# Patient Record
Sex: MALE | Race: WHITE | Employment: OTHER | URBAN - METROPOLITAN AREA
[De-identification: names, ages, dates, MRNs, and addresses within clinical notes are randomized per-mention and may not be internally consistent; named-entity substitution may affect disease eponyms.]

---

## 2017-11-14 ENCOUNTER — HOSPITAL ENCOUNTER (OUTPATIENT)
Dept: GENERAL RADIOLOGY | Age: 82
Discharge: HOME | End: 2017-11-14
Attending: FAMILY MEDICINE
Payer: MEDICARE

## 2017-11-14 DIAGNOSIS — R05.9 COUGH IN ADULT: ICD-10-CM

## 2017-11-14 PROCEDURE — 71020 XR CHEST PA LAT: CPT

## 2017-11-14 NOTE — PROGRESS NOTES
Please let Mr. Veronica Ramirez know that I have send in Doxycycline. Take full course of medication even if feeling better. Take with food. May continue with Mucinex. CXR does not show PNA but will treat based on symptoms and elevated wbc. Make sure he knows to follow up in 1-2 weeks with Dr. Gray Matta. Patient asked to call this number today for results. .. Clarissa Bell  [phone]

## 2019-05-23 ENCOUNTER — HOSPITAL ENCOUNTER (EMERGENCY)
Age: 84
Discharge: HOME | End: 2019-05-23
Attending: EMERGENCY MEDICINE
Payer: MEDICARE

## 2019-05-23 ENCOUNTER — APPOINTMENT (OUTPATIENT)
Dept: GENERAL RADIOLOGY | Age: 84
End: 2019-05-23
Attending: EMERGENCY MEDICINE
Payer: MEDICARE

## 2019-05-23 VITALS
OXYGEN SATURATION: 95 % | HEART RATE: 90 BPM | DIASTOLIC BLOOD PRESSURE: 74 MMHG | SYSTOLIC BLOOD PRESSURE: 125 MMHG | TEMPERATURE: 98 F | RESPIRATION RATE: 16 BRPM | WEIGHT: 238 LBS | BODY MASS INDEX: 33.32 KG/M2 | HEIGHT: 71 IN

## 2019-05-23 DIAGNOSIS — M25.552 LEFT HIP PAIN: Primary | ICD-10-CM

## 2019-05-23 PROCEDURE — 99283 EMERGENCY DEPT VISIT LOW MDM: CPT | Performed by: EMERGENCY MEDICINE

## 2019-05-23 PROCEDURE — 73502 X-RAY EXAM HIP UNI 2-3 VIEWS: CPT

## 2019-05-23 NOTE — DISCHARGE INSTRUCTIONS
1) Follow up with orthopedics for further evaluation into the cause of your hip pain. It is possible your hip pain may be due to degenerative changes in your lower back. 2) Take the pain medication prescribed today by your primary care doctor for pain. 3) Return to the ER for worsening of your symptoms.
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
## 2019-05-23 NOTE — ED NOTES
I have reviewed discharge instructions with the patient. The patient verbalized understanding. Patient left ED via Discharge Method: ambulatory to Home with self. Opportunity for questions and clarification provided. Patient given 0 scripts. To continue your aftercare when you leave the hospital, you may receive an automated call from our care team to check in on how you are doing. This is a free service and part of our promise to provide the best care and service to meet your aftercare needs.  If you have questions, or wish to unsubscribe from this service please call [phone]. Thank you for Choosing our [facility] Emergency Department.

## 2019-05-23 NOTE — ED TRIAGE NOTES
Pt c/o left hip pain and states he thinks it is coming from his back. Pt states the pain will be intense for a while and then lighten up. Pt has strong smell of urine from clothing.

## 2019-07-18 ENCOUNTER — HOSPITAL ENCOUNTER (OUTPATIENT)
Dept: GENERAL RADIOLOGY | Age: 84
Discharge: HOME | End: 2019-07-18
Payer: MEDICARE

## 2019-07-18 DIAGNOSIS — S49.91XA INJURY OF RIGHT SHOULDER, INITIAL ENCOUNTER: ICD-10-CM

## 2019-07-18 PROCEDURE — 73010 X-RAY EXAM OF SHOULDER BLADE: CPT

## 2020-06-02 ENCOUNTER — HOSPITAL ENCOUNTER (OUTPATIENT)
Dept: SURGERY | Age: 85
Discharge: HOME | End: 2020-06-02
Payer: MEDICARE

## 2020-06-02 VITALS
BODY MASS INDEX: 32.37 KG/M2 | WEIGHT: 231.25 LBS | TEMPERATURE: 98.3 F | OXYGEN SATURATION: 95 % | DIASTOLIC BLOOD PRESSURE: 77 MMHG | HEART RATE: 80 BPM | HEIGHT: 71 IN | SYSTOLIC BLOOD PRESSURE: 154 MMHG | RESPIRATION RATE: 16 BRPM

## 2020-06-02 LAB
ANION GAP SERPL CALC-SCNC: 4 MMOL/L (ref 7–16)
BUN SERPL-MCNC: 23 MG/DL (ref 8–23)
CALCIUM SERPL-MCNC: 9 MG/DL (ref 8.3–10.4)
CHLORIDE SERPL-SCNC: 105 MMOL/L (ref 98–107)
CO2 SERPL-SCNC: 29 MMOL/L (ref 21–32)
CREAT SERPL-MCNC: 1.1 MG/DL (ref 0.8–1.5)
ERYTHROCYTE [DISTWIDTH] IN BLOOD BY AUTOMATED COUNT: 13.9 % (ref 11.9–14.6)
GLUCOSE SERPL-MCNC: 92 MG/DL (ref 65–100)
HCT VFR BLD AUTO: 39.2 % (ref 41.1–50.3)
HGB BLD-MCNC: 12.6 G/DL (ref 13.6–17.2)
MCH RBC QN AUTO: 32 PG (ref 26.1–32.9)
MCHC RBC AUTO-ENTMCNC: 32.1 G/DL (ref 31.4–35)
MCV RBC AUTO: 99.5 FL (ref 79.6–97.8)
NRBC # BLD: 0 K/UL (ref 0–0.2)
PLATELET # BLD AUTO: 239 K/UL (ref 150–450)
PMV BLD AUTO: 8.7 FL (ref 9.4–12.3)
POTASSIUM SERPL-SCNC: 4.7 MMOL/L (ref 3.5–5.1)
RBC # BLD AUTO: 3.94 M/UL (ref 4.23–5.6)
SODIUM SERPL-SCNC: 138 MMOL/L (ref 136–145)
WBC # BLD AUTO: 12.6 K/UL (ref 4.3–11.1)

## 2020-06-02 PROCEDURE — 80048 BASIC METABOLIC PNL TOTAL CA: CPT

## 2020-06-02 PROCEDURE — 85027 COMPLETE CBC AUTOMATED: CPT

## 2020-06-02 NOTE — PERIOP NOTES
Patient verified name and     Order for consent  found in EHR and matches case posting; patient verified. Type 1B surgery, Walk in  assessment complete. Labs per surgeon: CBC and BMP; results pending. Labs per anesthesia protocol: no additional;   EKG: records in Care everywhere. Patient informed a Covid swab is required 7 days prior to surgery. The testing center is located at the [address], [facility]. For questions or concerns the patient is advised to call [phone]. An appointment is required and the testing clinic is closed from  for lunch and on weekends. Appointment date/time 20 at 1505 found in EHR and provided to patient. Hospital approved surgical skin cleanser and instructions given per hospital policy. Patient provided with and instructed on educational handouts including Guide to Surgery, Pain Management, Hand Hygiene, Blood Transfusion Education, and [facility] Brochure. Patient answered medical/surgical history questions at their best of ability. Pt was not aware of his current medications. Pt states he gets his refills at [facility]. Did not attempted to review medications with pharmacy due to pt stating several of his medications have been discontinued. Pt instructed to call PAT once home with medications. Patient instructed to hold all vitamins 7 days prior to surgery and NSAIDS 5 days prior to surgery, patient verbalized understanding. Patient teach back successful and patient demonstrates knowledge of instructions. PLEASE CONTINUE TAKING ALL PRESCRIPTION MEDICATIONS UP TO THE DAY OF SURGERY UNLESS OTHERWISE DIRECTED BELOW. DISCONTINUE all vitamins and supplements 7 days prior to surgery. DISCONTINUE Non-Steriodal Anti-Inflammatory (NSAIDS) such as Advil, Ibuprofen, Excedrin, Motrin, and Aleve 5 days prior to surgery.      Home Medications to take  the day of surgery      Please call [phone] when you get home with medication bottles. If you are not able to return call to [facility] with medications- Do not take any medications the morning of surgery. Bring all of your medications in their original medication bottles and the nurse will instruct you on which medications to take the day of surgery. Home Medications   to Hold             Comments      Hibiclens shower the night before surgery and the morning of surgery. COVID test today 6/2/20 at [place] [address]. *Visitor policy of 1 visitor per patient discussed. Please do not bring home medications with you on the day of surgery unless otherwise directed by your nurse. If you are instructed to bring home medications, please give them to your nurse as they will be administered by the nursing staff. If you have any questions, please call [facility] [phone] or [facility] [phone]. A copy of this note was provided to the patient for reference.

## 2020-06-02 NOTE — PERIOP NOTES
Recent Results (from the past 12 hour(s))   CBC W/O DIFF    Collection Time: 06/02/20  2:10 PM   Result Value Ref Range    WBC 12.6 (H) 4.3 - 11.1 K/uL    RBC 3.94 (L) 4.23 - 5.6 M/uL    HGB 12.6 (L) 13.6 - 17.2 g/dL    HCT 39.2 (L) 41.1 - 50.3 %    MCV 99.5 (H) 79.6 - 97.8 FL    MCH 32.0 26.1 - 32.9 PG    MCHC 32.1 31.4 - 35.0 g/dL    RDW 13.9 11.9 - 14.6 %    PLATELET 437 719 - 308 K/uL    MPV 8.7 (L) 9.4 - 12.3 FL    ABSOLUTE NRBC 0.00 0.0 - 0.2 K/uL   METABOLIC PANEL, BASIC    Collection Time: 06/02/20  2:10 PM   Result Value Ref Range    Sodium 138 136 - 145 mmol/L    Potassium 4.7 3.5 - 5.1 mmol/L    Chloride 105 98 - 107 mmol/L    CO2 29 21 - 32 mmol/L    Anion gap 4 (L) 7 - 16 mmol/L    Glucose 92 65 - 100 mg/dL    BUN 23 8 - 23 MG/DL    Creatinine 1.10 0.8 - 1.5 MG/DL    GFR est AA >60 >60 ml/min/1.73m2    GFR est non-AA >60 >60 ml/min/1.73m2    Calcium 9.0 8.3 - 10.4 MG/DL

## 2020-06-03 RX ORDER — LOSARTAN POTASSIUM 25 MG/1
25 TABLET ORAL
COMMUNITY
End: 2021-08-26 | Stop reason: SDUPTHER

## 2020-06-03 RX ORDER — HYDROCODONE BITARTRATE AND ACETAMINOPHEN 7.5; 325 MG/1; MG/1
1 TABLET ORAL
COMMUNITY
End: 2020-07-14

## 2020-06-03 RX ORDER — METOPROLOL TARTRATE 100 MG/1
100 TABLET ORAL
COMMUNITY
End: 2020-10-06

## 2020-06-03 NOTE — PERIOP NOTES
Received phone call from patient. Reviewed all medications. Patient instructed to stop following medications now and do not take from now through DOS. This includes: Vitamin C, multivitamin, fish oil, Meloxicam.  Patient instructed to take morning of surgery with sip of water:  Citalopram, Hydrocodone if needed, Levetiracetam, Lorazepam if needed, Omeprazole. Reviewed several times with patient. Patient repeated back correctly.

## 2020-06-08 ENCOUNTER — ANESTHESIA EVENT (OUTPATIENT)
Dept: SURGERY | Age: 85
End: 2020-06-08
Payer: MEDICARE

## 2020-06-09 ENCOUNTER — ANESTHESIA (OUTPATIENT)
Dept: SURGERY | Age: 85
End: 2020-06-09
Payer: MEDICARE

## 2020-06-09 ENCOUNTER — HOSPITAL ENCOUNTER (OUTPATIENT)
Age: 85
Setting detail: OUTPATIENT SURGERY
Discharge: HOME | End: 2020-06-09
Attending: UROLOGY | Admitting: UROLOGY
Payer: MEDICARE

## 2020-06-09 VITALS
TEMPERATURE: 98.6 F | WEIGHT: 228 LBS | HEART RATE: 71 BPM | DIASTOLIC BLOOD PRESSURE: 80 MMHG | BODY MASS INDEX: 31.92 KG/M2 | RESPIRATION RATE: 17 BRPM | HEIGHT: 71 IN | SYSTOLIC BLOOD PRESSURE: 160 MMHG | OXYGEN SATURATION: 97 %

## 2020-06-09 PROCEDURE — 77030005518 HC CATH URETH FOL 2W BARD -B: Performed by: UROLOGY

## 2020-06-09 PROCEDURE — 77030010509 HC AIRWY LMA MSK TELE -A: Performed by: ANESTHESIOLOGY

## 2020-06-09 PROCEDURE — 74011250636 HC RX REV CODE- 250/636: Performed by: UROLOGY

## 2020-06-09 PROCEDURE — 77030040831 HC BAG URINE DRNG MDII -A: Performed by: UROLOGY

## 2020-06-09 PROCEDURE — 77030040922 HC BLNKT HYPOTHRM STRY -A: Performed by: ANESTHESIOLOGY

## 2020-06-09 PROCEDURE — 76010000149 HC OR TIME 1 TO 1.5 HR: Performed by: UROLOGY

## 2020-06-09 PROCEDURE — 77030002996 HC SUT SLK J&J -A: Performed by: UROLOGY

## 2020-06-09 PROCEDURE — 77030040525: Performed by: UROLOGY

## 2020-06-09 PROCEDURE — 74011000250 HC RX REV CODE- 250: Performed by: NURSE ANESTHETIST, CERTIFIED REGISTERED

## 2020-06-09 PROCEDURE — 76210000020 HC REC RM PH II FIRST 0.5 HR: Performed by: UROLOGY

## 2020-06-09 PROCEDURE — 77030018832 HC SOL IRR H20 ICUM -A: Performed by: UROLOGY

## 2020-06-09 PROCEDURE — 76210000006 HC OR PH I REC 0.5 TO 1 HR: Performed by: UROLOGY

## 2020-06-09 PROCEDURE — 76060000033 HC ANESTHESIA 1 TO 1.5 HR: Performed by: UROLOGY

## 2020-06-09 PROCEDURE — 74011250636 HC RX REV CODE- 250/636: Performed by: NURSE ANESTHETIST, CERTIFIED REGISTERED

## 2020-06-09 PROCEDURE — C1769 GUIDE WIRE: HCPCS | Performed by: UROLOGY

## 2020-06-09 PROCEDURE — 74011250636 HC RX REV CODE- 250/636: Performed by: ANESTHESIOLOGY

## 2020-06-09 RX ORDER — SODIUM CHLORIDE, SODIUM LACTATE, POTASSIUM CHLORIDE, CALCIUM CHLORIDE 600; 310; 30; 20 MG/100ML; MG/100ML; MG/100ML; MG/100ML
100 INJECTION, SOLUTION INTRAVENOUS CONTINUOUS
Status: DISCONTINUED | OUTPATIENT
Start: 2020-06-09 | End: 2020-06-09 | Stop reason: HOSPADM

## 2020-06-09 RX ORDER — PROPOFOL 10 MG/ML
INJECTION, EMULSION INTRAVENOUS AS NEEDED
Status: DISCONTINUED | OUTPATIENT
Start: 2020-06-09 | End: 2020-06-09 | Stop reason: HOSPADM

## 2020-06-09 RX ORDER — LIDOCAINE HYDROCHLORIDE 10 MG/ML
0.1 INJECTION INFILTRATION; PERINEURAL AS NEEDED
Status: DISCONTINUED | OUTPATIENT
Start: 2020-06-09 | End: 2020-06-09 | Stop reason: HOSPADM

## 2020-06-09 RX ORDER — HYOSCYAMINE SULFATE 0.12 MG/1
0.12 TABLET SUBLINGUAL
Qty: 30 TAB | Refills: 1 | Status: SHIPPED | OUTPATIENT
Start: 2020-06-09 | End: 2020-09-14 | Stop reason: SDUPTHER

## 2020-06-09 RX ORDER — SODIUM CHLORIDE, SODIUM LACTATE, POTASSIUM CHLORIDE, CALCIUM CHLORIDE 600; 310; 30; 20 MG/100ML; MG/100ML; MG/100ML; MG/100ML
75 INJECTION, SOLUTION INTRAVENOUS CONTINUOUS
Status: DISCONTINUED | OUTPATIENT
Start: 2020-06-09 | End: 2020-06-09 | Stop reason: HOSPADM

## 2020-06-09 RX ORDER — CEFAZOLIN SODIUM/WATER 2 G/20 ML
2 SYRINGE (ML) INTRAVENOUS
Status: COMPLETED | OUTPATIENT
Start: 2020-06-09 | End: 2020-06-09

## 2020-06-09 RX ORDER — ONDANSETRON 2 MG/ML
4 INJECTION INTRAMUSCULAR; INTRAVENOUS
Status: DISCONTINUED | OUTPATIENT
Start: 2020-06-09 | End: 2020-06-09 | Stop reason: HOSPADM

## 2020-06-09 RX ORDER — DIPHENHYDRAMINE HYDROCHLORIDE 50 MG/ML
12.5 INJECTION, SOLUTION INTRAMUSCULAR; INTRAVENOUS
Status: DISCONTINUED | OUTPATIENT
Start: 2020-06-09 | End: 2020-06-09 | Stop reason: HOSPADM

## 2020-06-09 RX ORDER — OXYCODONE HYDROCHLORIDE 5 MG/1
5 TABLET ORAL
Status: DISCONTINUED | OUTPATIENT
Start: 2020-06-09 | End: 2020-06-09 | Stop reason: HOSPADM

## 2020-06-09 RX ORDER — ONDANSETRON 2 MG/ML
INJECTION INTRAMUSCULAR; INTRAVENOUS AS NEEDED
Status: DISCONTINUED | OUTPATIENT
Start: 2020-06-09 | End: 2020-06-09 | Stop reason: HOSPADM

## 2020-06-09 RX ORDER — LIDOCAINE HYDROCHLORIDE 20 MG/ML
INJECTION, SOLUTION EPIDURAL; INFILTRATION; INTRACAUDAL; PERINEURAL AS NEEDED
Status: DISCONTINUED | OUTPATIENT
Start: 2020-06-09 | End: 2020-06-09 | Stop reason: HOSPADM

## 2020-06-09 RX ORDER — HYDROMORPHONE HYDROCHLORIDE 2 MG/ML
0.2 INJECTION, SOLUTION INTRAMUSCULAR; INTRAVENOUS; SUBCUTANEOUS
Status: DISCONTINUED | OUTPATIENT
Start: 2020-06-09 | End: 2020-06-09 | Stop reason: HOSPADM

## 2020-06-09 RX ORDER — DEXAMETHASONE SODIUM PHOSPHATE 4 MG/ML
INJECTION, SOLUTION INTRA-ARTICULAR; INTRALESIONAL; INTRAMUSCULAR; INTRAVENOUS; SOFT TISSUE AS NEEDED
Status: DISCONTINUED | OUTPATIENT
Start: 2020-06-09 | End: 2020-06-09 | Stop reason: HOSPADM

## 2020-06-09 RX ORDER — FENTANYL CITRATE 50 UG/ML
INJECTION, SOLUTION INTRAMUSCULAR; INTRAVENOUS AS NEEDED
Status: DISCONTINUED | OUTPATIENT
Start: 2020-06-09 | End: 2020-06-09 | Stop reason: HOSPADM

## 2020-06-09 RX ADMIN — FENTANYL CITRATE 25 MCG: 50 INJECTION INTRAMUSCULAR; INTRAVENOUS at 14:27

## 2020-06-09 RX ADMIN — FENTANYL CITRATE 25 MCG: 50 INJECTION INTRAMUSCULAR; INTRAVENOUS at 14:43

## 2020-06-09 RX ADMIN — PROPOFOL 150 MG: 10 INJECTION, EMULSION INTRAVENOUS at 14:20

## 2020-06-09 RX ADMIN — FENTANYL CITRATE 50 MCG: 50 INJECTION INTRAMUSCULAR; INTRAVENOUS at 14:20

## 2020-06-09 RX ADMIN — FENTANYL CITRATE 25 MCG: 50 INJECTION INTRAMUSCULAR; INTRAVENOUS at 14:54

## 2020-06-09 RX ADMIN — ONDANSETRON 4 MG: 2 INJECTION INTRAMUSCULAR; INTRAVENOUS at 14:36

## 2020-06-09 RX ADMIN — Medication 2 G: at 14:24

## 2020-06-09 RX ADMIN — DEXAMETHASONE SODIUM PHOSPHATE 4 MG: 4 INJECTION, SOLUTION INTRAMUSCULAR; INTRAVENOUS at 14:36

## 2020-06-09 RX ADMIN — FENTANYL CITRATE 25 MCG: 50 INJECTION INTRAMUSCULAR; INTRAVENOUS at 14:41

## 2020-06-09 RX ADMIN — LIDOCAINE HYDROCHLORIDE 100 MG: 20 INJECTION, SOLUTION EPIDURAL; INFILTRATION; INTRACAUDAL; PERINEURAL at 14:20

## 2020-06-09 RX ADMIN — SODIUM CHLORIDE, SODIUM LACTATE, POTASSIUM CHLORIDE, AND CALCIUM CHLORIDE 100 ML/HR: 600; 310; 30; 20 INJECTION, SOLUTION INTRAVENOUS at 11:44

## 2020-06-09 RX ADMIN — FENTANYL CITRATE 25 MCG: 50 INJECTION INTRAMUSCULAR; INTRAVENOUS at 14:31

## 2020-06-09 NOTE — ANESTHESIA POSTPROCEDURE EVALUATION
Procedure(s):  CYSTOSCOPY WITH SUPRAPUBIC TUBE INSERTION. general    Anesthesia Post Evaluation      Multimodal analgesia: multimodal analgesia used between 6 hours prior to anesthesia start to PACU discharge  Patient location during evaluation: PACU  Patient participation: complete - patient participated  Level of consciousness: awake  Pain management: adequate  Airway patency: patent  Anesthetic complications: no  Cardiovascular status: acceptable  Respiratory status: spontaneous ventilation and acceptable  Hydration status: acceptable  Post anesthesia nausea and vomiting:  none      INITIAL Post-op Vital signs:   Vitals Value Taken Time   /80 6/9/2020  3:45 PM   Temp 37 °C (98.6 °F) 6/9/2020  3:45 PM   Pulse 70 6/9/2020  3:46 PM   Resp 17 6/9/2020  3:45 PM   SpO2 98 % 6/9/2020  3:46 PM   Vitals shown include unvalidated device data.
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
## 2020-06-09 NOTE — BRIEF OP NOTE
Brief Postoperative Note    Patient: Jaylin Nolasco  YOB: 1934  MRN: [id number]    Date of Procedure: 6/9/2020     Pre-Op Diagnosis: Urinary retention [R33.9]    Post-Op Diagnosis: Same as preoperative diagnosis. Procedure(s):  CYSTOSCOPY WITH SUPRAPUBIC TUBE INSERTION    Surgeon(s): Jhonatan Hood MD    Surgical Assistant: None    Anesthesia: General     Estimated Blood Loss (mL): Minimal    Complications: None    Specimens: * No specimens in log *     Implants: * No implants in log *    Drains: Martinez Catheter 16 Fr two way with 10 cc sterile water     Findings: Trabeculated bladder without abnormality. Successful SP tube placement.      Electronically Signed by Miguel Botello MD on 6/9/2020 at 3:01 PM

## 2020-06-09 NOTE — ANESTHESIA PREPROCEDURE EVALUATION
Relevant Problems   No relevant active problems       Anesthetic History   No history of anesthetic complications            Review of Systems / Medical History  Patient summary reviewed and pertinent labs reviewed    Pulmonary          Shortness of breath and smoker         Neuro/Psych     seizures: well controlled    Psychiatric history (Schizophrenia, Bipolar D/o, Depression)    Comments: Neurogenic claudication, H/o Intracerebral hemorrhage.  Cardiovascular    Hypertension        Dysrhythmias : atrial fibrillation  Hyperlipidemia    Exercise tolerance: >4 METS  Comments: Denies recent CP, SOB or Palpitations   GI/Hepatic/Renal     GERD: well controlled           Endo/Other        Obesity     Other Findings            Physical Exam    Airway  Mallampati: II  TM Distance: 4 - 6 cm  Neck ROM: normal range of motion   Mouth opening: Normal     Cardiovascular  Regular rate and rhythm,  S1 and S2 normal,  no murmur, click, rub, or gallop             Dental  No notable dental hx       Pulmonary  Breath sounds clear to auscultation               Abdominal  GI exam deferred       Other Findings            Anesthetic Plan    ASA: 3  Anesthesia type: general          Induction: Intravenous  Anesthetic plan and risks discussed with: Patient    Granddaughter present

## 2020-06-09 NOTE — DISCHARGE INSTRUCTIONS
You are going home with a suprapubic catheter in place. This tube is placed directly into the bladder through your abdomen to drain urine from your bladder. Home care  [facility] as necessary.  Change your dressing every day until your follow up appointment. When to call your healthcare provider  Call your health care provider right away if you have any of the following:   Catheter that falls out, or is clogged or feels clogged   Urine leaking around catheter   Urine that is cloudy, bloody, or smells bad   No urine drainage   Bladder that feels full or painful   Rash, itching, redness, swelling, or drainage at the catheter site  DIET  · Clear liquids until no nausea or vomiting; then light diet for the first day. · Advance to regular diet on second day, unless your doctor orders otherwise. · If nausea and vomiting continues, call your doctor. PAIN  · Take pain medication as directed by your doctor. · Call your doctor if pain is NOT relieved by medication. · DO NOT take aspirin of blood thinners unless directed by your doctor. After general anesthesia or intravenous sedation, for 24 hours or while taking prescription Narcotics:  · Limit your activities  · A responsible adult needs to be with you for the next 24 hours  · Do not drive and operate hazardous machinery  · Do not make important personal or business decisions  · Do not drink alcoholic beverages  · If you have not urinated within 8 hours after discharge, please contact your surgeon on call. · If you have sleep apnea and have a CPAP machine, please use it for all naps and sleeping. · Please use caution when taking narcotics and any of your home medications that may cause drowsiness. These are general instructions for a healthy lifestyle:  No smoking/ No tobacco products/ Avoid exposure to second hand smoke  Surgeon General's Warning:  Quitting smoking now greatly reduces serious risk to your health.     An indwelling Martinez Catheter is a tube that empties urine from your bladder into a drainage bag. To prevent blockage of the catheter and contamination of the urine, it is important that you follow a few guidelines in caring for your catheter:    1. Empty your drainage bag once every 8 hours or as soon as it fills. 2.  Empty the bag by loosening the clamp on the end of the bag (leg or bedside bag). Do not touch the tip of the tube. After draining the urine into the toilet, you may clean the tip with a povidone-iodine (Betadine) solution. Wash hands well before and after emptying drainage bag.    3.  Always keep the drainage bag lower than the catheter. Remember that urine will not drain uphill or against gravity. Check the tubing for kinks, since urine will not drain past a kink. 4. Flush your bladder by drinking plenty of liquids. Clear liquids and water are ideal; remember to drink at least 8 glasses of water each day. 5.  It is important to clean around your meatus every day and after having a bowel movement (riri. Female)  while you have an indwelling arnett catheter. Using a soapy wash cloth, wash area thoroughly and rinse, using a forward to backward motion being careful not to introduce bacteria  around catheter. 6.  If you are to keep your catheter for an extended period of time, you may be given a leg bag that attaches to your thigh or calf with Velcro straps. If you are sent home with more than one bag, you will be given instructions on how to care for the bags and change them. 7.  If you are to remove your catheter at home, you will be sent home with specific instructions on how to do that.

## 2020-06-10 NOTE — OP NOTES
[facility]  OPERATIVE REPORT    Name:  Meka Shields  MR#:  [id number]  :  1934  ACCOUNT #:  [de-identified]  DATE OF SERVICE:  2020    PREOPERATIVE DIAGNOSIS:  Neurogenic bladder with urinary retention. POSTOPERATIVE DIAGNOSIS:  Neurogenic bladder with urinary retention. PROCEDURE PERFORMED:  Cystoscopy with suprapubic catheter placement. SURGEON:  Cindie Phoenix, MD    ASSISTANT:  None. ANESTHESIA:  General.    COMPLICATIONS:  None. SPECIMENS REMOVED:  None. IMPLANTS:  None. ESTIMATED BLOOD LOSS:  Minimal.    DRAINS:  Martinez catheter [facility] two-way 20 mL sterile water in the balloon. FINDINGS:  Trabeculated bladder without abnormalities, successful SPC placement under direct vision. INDICATIONS FOR OPERATIVE PROCEDURE:  The patient is an 70-year-old gentleman with chronic neurogenic bladder and urinary retention, managed with indwelling Martinez catheter who states that he currently has issues with pain from chronic catheter as well as concern for hypospadias from a long-term indwelling catheter. He presented to the office and I recommended suprapubic catheter placement in the operating room today after risks and benefit discussion to drain his bladder, but also allow him to be made free from a traumatic catheter. DESCRIPTION OF OPERATIVE PROCEDURE:  After informed consent was obtained, the correct patient was identified in the preoperative holding area. He was taken back to the operating room suite and placed on the table in the supine position. Time-out was performed confirming the correct patient and planned procedure. He received 2 g of IV Ancef prior to smooth induction of general endotracheal anesthesia. He was placed in dorsal lithotomy position. He was then prepped and draped in the usual sterile fashion. I began the case by inserting a 22-Bahamian rigid cystoscope with a 30-degree lens into the urethra.   I advanced into the patient's bladder. Pancystoscopy was performed which revealed a possible trabeculated bladder, but no concerning bladder masses or other abnormalities were noted. I then turned the cystoscope to look at the dome of the patient's bladder as it went through a portion of the bladder. I identified a 0.1 cm above the patient's pubic bone at the midline. I made a stab incision using a #11-blade scalpel. I then inserted the spinal needle and passed it under direct vision into the patient's bladder. I then successfully passed a sensor wire through the spinal needle under direct vision and I removed the spinal needle leaving the sensor wire in place at that time. I then used [facility] SPT dilator set and similarly dilated over the sensor wire . I placed the access sheath over the left dilator. I then removed the dilator and then passed a 16-[place]-tip catheter over the sensor wire into the patient's previous sheath into the bladder. The catheter was visualized and passed through the sheath, I then pulled the sheath away and inflated the balloon with 20 mL of sterile water. I removed the sensor wire and left the catheter to drainage. I used 2-0 silk ties to suture the catheter in place so that it would not dislodge. I connected it to drainage. The patient was awoken from anesthesia, transferred to the PACU in stable condition. He tolerated the procedure well. There were no complications. All counts were correct at the end of the procedure.         Jennifer Aguilar MD      [id number]  D:  06/09/2020 15:16  T:  06/10/2020 5:54  JOB #:  [id number]

## 2020-06-30 ENCOUNTER — APPOINTMENT (OUTPATIENT)
Dept: PHYSICAL THERAPY | Age: 85
End: 2020-06-30

## 2020-07-06 ENCOUNTER — HOSPITAL ENCOUNTER (OUTPATIENT)
Dept: SURGERY | Age: 85
Discharge: HOME | End: 2020-07-06
Payer: MEDICARE

## 2020-07-06 VITALS
BODY MASS INDEX: 30.99 KG/M2 | WEIGHT: 233.8 LBS | RESPIRATION RATE: 16 BRPM | HEIGHT: 73 IN | TEMPERATURE: 97.3 F | DIASTOLIC BLOOD PRESSURE: 75 MMHG | SYSTOLIC BLOOD PRESSURE: 153 MMHG | HEART RATE: 73 BPM | OXYGEN SATURATION: 100 %

## 2020-07-06 LAB
ALBUMIN SERPL-MCNC: 3.3 G/DL (ref 3.2–4.6)
ANION GAP SERPL CALC-SCNC: 4 MMOL/L (ref 7–16)
APTT PPP: 28 SEC (ref 24.3–35.4)
BACTERIA SPEC CULT: ABNORMAL
BASOPHILS # BLD: 0.1 K/UL (ref 0–0.2)
BASOPHILS NFR BLD: 1 % (ref 0–2)
BUN SERPL-MCNC: 26 MG/DL (ref 8–23)
CALCIUM SERPL-MCNC: 8.9 MG/DL (ref 8.3–10.4)
CHLORIDE SERPL-SCNC: 106 MMOL/L (ref 98–107)
CO2 SERPL-SCNC: 29 MMOL/L (ref 21–32)
CREAT SERPL-MCNC: 1.05 MG/DL (ref 0.8–1.5)
DIFFERENTIAL METHOD BLD: ABNORMAL
EOSINOPHIL # BLD: 0.2 K/UL (ref 0–0.8)
EOSINOPHIL NFR BLD: 2 % (ref 0.5–7.8)
ERYTHROCYTE [DISTWIDTH] IN BLOOD BY AUTOMATED COUNT: 13.7 % (ref 11.9–14.6)
EST. AVERAGE GLUCOSE BLD GHB EST-MCNC: 134 MG/DL
GLUCOSE SERPL-MCNC: 96 MG/DL (ref 65–100)
HBA1C MFR BLD: 6.3 %
HCT VFR BLD AUTO: 41.3 % (ref 41.1–50.3)
HGB BLD-MCNC: 12.8 G/DL (ref 13.6–17.2)
IMM GRANULOCYTES # BLD AUTO: 0.1 K/UL (ref 0–0.5)
IMM GRANULOCYTES NFR BLD AUTO: 1 % (ref 0–5)
INR PPP: 1
LYMPHOCYTES # BLD: 1.9 K/UL (ref 0.5–4.6)
LYMPHOCYTES NFR BLD: 16 % (ref 13–44)
MCH RBC QN AUTO: 30.5 PG (ref 26.1–32.9)
MCHC RBC AUTO-ENTMCNC: 31 G/DL (ref 31.4–35)
MCV RBC AUTO: 98.6 FL (ref 79.6–97.8)
MONOCYTES # BLD: 1.1 K/UL (ref 0.1–1.3)
MONOCYTES NFR BLD: 9 % (ref 4–12)
NEUTS SEG # BLD: 8.3 K/UL (ref 1.7–8.2)
NEUTS SEG NFR BLD: 72 % (ref 43–78)
NRBC # BLD: 0 K/UL (ref 0–0.2)
PLATELET # BLD AUTO: 278 K/UL (ref 150–450)
PMV BLD AUTO: 9 FL (ref 9.4–12.3)
POTASSIUM SERPL-SCNC: 4.7 MMOL/L (ref 3.5–5.1)
PROTHROMBIN TIME: 13.3 SEC (ref 12–14.7)
RBC # BLD AUTO: 4.19 M/UL (ref 4.23–5.6)
SERVICE CMNT-IMP: ABNORMAL
SODIUM SERPL-SCNC: 139 MMOL/L (ref 136–145)
WBC # BLD AUTO: 11.6 K/UL (ref 4.3–11.1)

## 2020-07-06 PROCEDURE — 82040 ASSAY OF SERUM ALBUMIN: CPT

## 2020-07-06 PROCEDURE — 87641 MR-STAPH DNA AMP PROBE: CPT

## 2020-07-06 PROCEDURE — 83036 HEMOGLOBIN GLYCOSYLATED A1C: CPT

## 2020-07-06 PROCEDURE — 80307 DRUG TEST PRSMV CHEM ANLYZR: CPT

## 2020-07-06 PROCEDURE — 36415 COLL VENOUS BLD VENIPUNCTURE: CPT

## 2020-07-06 PROCEDURE — 85730 THROMBOPLASTIN TIME PARTIAL: CPT

## 2020-07-06 PROCEDURE — 80048 BASIC METABOLIC PNL TOTAL CA: CPT

## 2020-07-06 PROCEDURE — 85025 COMPLETE CBC W/AUTO DIFF WBC: CPT

## 2020-07-06 PROCEDURE — 85610 PROTHROMBIN TIME: CPT

## 2020-07-06 RX ORDER — MV/FA/DHA/EPA/FISH OIL/SAW/GNK 400MCG-200
1 COMBINATION PACKAGE (EA) ORAL DAILY
COMMUNITY
End: 2020-07-14

## 2020-07-06 NOTE — PERIOP NOTES
PLEASE CONTINUE TAKING ALL PRESCRIPTION MEDICATIONS UP TO THE DAY OF SURGERY UNLESS OTHERWISE DIRECTED BELOW. DISCONTINUE all vitamins and supplements 21 days prior to surgery. DISCONTINUE Non-Steriodal Anti-Inflammatory (NSAIDS) such as Advil and Aleve 5 days prior to surgery. Home Medications to take  the day of surgery   Aspirin, citalopram, Hydrocodone if needed, levetiracetam, lorazepa if needed, omeprazole if needed            Home Medications   to Hold   Stop vitamins and supplements now   Stop meloxicam five days before surgery         Comments          *Visitor policy of 1 visitor per patient discussed. Please do not bring home medications with you on the day of surgery unless otherwise directed by your nurse. If you are instructed to bring home medications, please give them to your nurse as they will be administered by the nursing staff. If you have any questions, please call Shaggy Bryant [phone]. A copy of this note was provided to the patient for reference.

## 2020-07-06 NOTE — PERIOP NOTES
Patient verified name and . Order for consent found in EHR and matches case posting; patient verified. robotic-assisted right total knee arthroplasty    Type 3 surgery, PAT Joint assessment complete. Labs per surgeon: CBC,BMP, PT/PTT, HgbA1C, albumin and nicotine; results pending. Will have charge nurse check lab results. Labs per anesthesia protocol: no additional lab work needed. EKG: Done today- within anesthesia guidelines. Patient informed a Covid swab is required 7 days prior to surgery. [facility] [place]. For questions or concerns the patient is advised to call [phone]. An appointment is required and the testing clinic is closed from 12- for lunch and on weekends. Appointment date/time found in EHR and provided to patient. Last cardiology office visit dated 20 and Echo dated 20 found in care everywhere if needed for anesthesia reference. Pt had new onset A. Fib with RVR on 02; will have anesthesia review chart. EKG today was NSR. MRSA/MSSA swab collected; pharmacy to review and dose antibiotic as appropriate. Hospital approved surgical skin cleanser and instructions to return bottle on DOS given per hospital policy. Patient provided with handouts including Guide to Surgery, Pain Management, Hand Hygiene, Blood Transfusion Education, and [place] Anesthesia Brochure. Patient answered medical/surgical history questions at their best of ability. All prior to admission medications documented in [facility]. Original medication prescription bottle not visualized during patient appointment. Patient instructed to hold all vitamins 3 weeks prior to surgery and NSAIDS 5 days prior to surgery. Patient teach back successful and patient demonstrates knowledge of instruction.

## 2020-07-07 LAB
ATRIAL RATE: 72 BPM
CALCULATED P AXIS, ECG09: 38 DEGREES
CALCULATED R AXIS, ECG10: 40 DEGREES
CALCULATED T AXIS, ECG11: 40 DEGREES
COTININE UR QL SCN: NEGATIVE NG/ML
DIAGNOSIS, 93000: NORMAL
DRUG SCREEN COMMENT:, 753798: NORMAL
P-R INTERVAL, ECG05: 166 MS
Q-T INTERVAL, ECG07: 400 MS
QRS DURATION, ECG06: 90 MS
QTC CALCULATION (BEZET), ECG08: 438 MS
VENTRICULAR RATE, ECG03: 72 BPM
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
## 2020-07-07 NOTE — PERIOP NOTES
Labs done 7/6/20 within [facility] protocols. Recent Results (from the past 24 hour(s))   EKG, 12 LEAD, INITIAL    Collection Time: 07/06/20  2:59 PM   Result Value Ref Range    Ventricular Rate 72 BPM    Atrial Rate 72 BPM    P-R Interval 166 ms    QRS Duration 90 ms    Q-T Interval 400 ms    QTC Calculation (Bezet) 438 ms    Calculated P Axis 38 degrees    Calculated R Axis 40 degrees    Calculated T Axis 40 degrees    Diagnosis       Normal sinus rhythm  Normal ECG  No previous ECGs available  Confirmed by Jose Metzger MD (), RUMA NGUYEN ([id number]) on 7/7/2020 7:29:13 AM     MSSA/MRSA SC BY PCR, NASAL SWAB    Collection Time: 07/06/20  4:00 PM   Result Value Ref Range    Special Requests: NO SPECIAL REQUESTS      Culture result: (A)       MRSA target DNA not detected, SA target DNA detected. A MRSA negative, SA positive test result does not preclude MRSA nasal colonization. CBC WITH AUTOMATED DIFF    Collection Time: 07/06/20  4:07 PM   Result Value Ref Range    WBC 11.6 (H) 4.3 - 11.1 K/uL    RBC 4.19 (L) 4.23 - 5.6 M/uL    HGB 12.8 (L) 13.6 - 17.2 g/dL    HCT 41.3 41.1 - 50.3 %    MCV 98.6 (H) 79.6 - 97.8 FL    MCH 30.5 26.1 - 32.9 PG    MCHC 31.0 (L) 31.4 - 35.0 g/dL    RDW 13.7 11.9 - 14.6 %    PLATELET 891 053 - 750 K/uL    MPV 9.0 (L) 9.4 - 12.3 FL    ABSOLUTE NRBC 0.00 0.0 - 0.2 K/uL    DF AUTOMATED      NEUTROPHILS 72 43 - 78 %    LYMPHOCYTES 16 13 - 44 %    MONOCYTES 9 4.0 - 12.0 %    EOSINOPHILS 2 0.5 - 7.8 %    BASOPHILS 1 0.0 - 2.0 %    IMMATURE GRANULOCYTES 1 0.0 - 5.0 %    ABS. NEUTROPHILS 8.3 (H) 1.7 - 8.2 K/UL    ABS. LYMPHOCYTES 1.9 0.5 - 4.6 K/UL    ABS. MONOCYTES 1.1 0.1 - 1.3 K/UL    ABS. EOSINOPHILS 0.2 0.0 - 0.8 K/UL    ABS. BASOPHILS 0.1 0.0 - 0.2 K/UL    ABS. IMM.  GRANS. 0.1 0.0 - 0.5 K/UL   HEMOGLOBIN A1C WITH EAG    Collection Time: 07/06/20  4:07 PM   Result Value Ref Range    Hemoglobin A1c 6.3 %    Est. average glucose 037 mg/dL   METABOLIC PANEL, BASIC    Collection Time: 07/06/20  4:07 PM   Result Value Ref Range    Sodium 139 136 - 145 mmol/L    Potassium 4.7 3.5 - 5.1 mmol/L    Chloride 106 98 - 107 mmol/L    CO2 29 21 - 32 mmol/L    Anion gap 4 (L) 7 - 16 mmol/L    Glucose 96 65 - 100 mg/dL    BUN 26 (H) 8 - 23 MG/DL    Creatinine 1.05 0.8 - 1.5 MG/DL    GFR est AA >60 >60 ml/min/1.73m2    GFR est non-AA >60 >60 ml/min/1.73m2    Calcium 8.9 8.3 - 10.4 MG/DL   PROTHROMBIN TIME + INR    Collection Time: 07/06/20  4:07 PM   Result Value Ref Range    Prothrombin time 13.3 12.0 - 14.7 sec    INR 1.0     PTT    Collection Time: 07/06/20  4:07 PM   Result Value Ref Range    aPTT 28.0 24.3 - 35.4 SEC   ALBUMIN    Collection Time: 07/06/20  4:07 PM   Result Value Ref Range    Albumin 3.3 3.2 - 4.6 g/dL

## 2020-07-09 NOTE — PERIOP NOTES
NICOTINE/COTININEMorrell Specter, QT [[id number]] (Order [id number])   Lab   Date: 7/6/2020  Department: [facility] Pre Assessment  Released By: Adan Saleh RN (auto-released)  Authorizing: Cedric Hunt MD    Component  Value  Flag  Ref Range  Units  Status    Cotinine Screen, urine  Negative   Whnohl=[id number]  ng/mL  Final    Drug Screen Comment:  Comment

## 2020-07-10 RX ORDER — CEFAZOLIN SODIUM/WATER 2 G/20 ML
2 SYRINGE (ML) INTRAVENOUS ONCE
Status: CANCELLED | OUTPATIENT
Start: 2020-07-10 | End: 2020-07-10

## 2020-07-12 ENCOUNTER — ANESTHESIA EVENT (OUTPATIENT)
Dept: SURGERY | Age: 85
DRG: 470 | End: 2020-07-12
Payer: MEDICARE

## 2020-07-13 ENCOUNTER — HOSPITAL ENCOUNTER (INPATIENT)
Age: 85
LOS: 1 days | Discharge: NURSING, REHAB OR LONG-TERM CARE | DRG: 470 | End: 2020-07-14
Attending: ORTHOPAEDIC SURGERY | Admitting: ORTHOPAEDIC SURGERY
Payer: MEDICARE

## 2020-07-13 ENCOUNTER — ANESTHESIA (OUTPATIENT)
Dept: SURGERY | Age: 85
DRG: 470 | End: 2020-07-13
Payer: MEDICARE

## 2020-07-13 ENCOUNTER — HOME HEALTH ADMISSION (OUTPATIENT)
Dept: HOME HEALTH SERVICES | Facility: HOME HEALTH | Age: 85
End: 2020-07-13
Payer: MEDICARE

## 2020-07-13 ENCOUNTER — APPOINTMENT (OUTPATIENT)
Dept: CT IMAGING | Age: 85
DRG: 470 | End: 2020-07-13
Attending: ORTHOPAEDIC SURGERY
Payer: MEDICARE

## 2020-07-13 DIAGNOSIS — M17.9 OSTEOARTHRITIS OF KNEE, UNSPECIFIED LATERALITY, UNSPECIFIED OSTEOARTHRITIS TYPE: Primary | ICD-10-CM

## 2020-07-13 PROBLEM — M17.11 OSTEOARTHRITIS OF RIGHT KNEE: Status: ACTIVE | Noted: 2020-07-13

## 2020-07-13 LAB
GLUCOSE BLD STRIP.AUTO-MCNC: 92 MG/DL (ref 65–100)
HGB BLD-MCNC: 11.8 G/DL (ref 13.6–17.2)

## 2020-07-13 PROCEDURE — 73700 CT LOWER EXTREMITY W/O DYE: CPT

## 2020-07-13 PROCEDURE — 77030036688 HC BLNKT CLD THER S2SG -B

## 2020-07-13 PROCEDURE — 76010010054 HC POST OP PAIN BLOCK: Performed by: ORTHOPAEDIC SURGERY

## 2020-07-13 PROCEDURE — 74011000250 HC RX REV CODE- 250: Performed by: NURSE PRACTITIONER

## 2020-07-13 PROCEDURE — 77030040361 HC SLV COMPR DVT MDII -B

## 2020-07-13 PROCEDURE — 77030040922 HC BLNKT HYPOTHRM STRY -A: Performed by: ANESTHESIOLOGY

## 2020-07-13 PROCEDURE — 77030018836 HC SOL IRR NACL ICUM -A: Performed by: ORTHOPAEDIC SURGERY

## 2020-07-13 PROCEDURE — 76210000006 HC OR PH I REC 0.5 TO 1 HR: Performed by: ORTHOPAEDIC SURGERY

## 2020-07-13 PROCEDURE — 65270000029 HC RM PRIVATE

## 2020-07-13 PROCEDURE — 77030013708 HC HNDPC SUC IRR PULS STRY –B: Performed by: ORTHOPAEDIC SURGERY

## 2020-07-13 PROCEDURE — 74011250636 HC RX REV CODE- 250/636: Performed by: ANESTHESIOLOGY

## 2020-07-13 PROCEDURE — C1713 ANCHOR/SCREW BN/BN,TIS/BN: HCPCS | Performed by: ORTHOPAEDIC SURGERY

## 2020-07-13 PROCEDURE — C1776 JOINT DEVICE (IMPLANTABLE): HCPCS | Performed by: ORTHOPAEDIC SURGERY

## 2020-07-13 PROCEDURE — 85018 HEMOGLOBIN: CPT

## 2020-07-13 PROCEDURE — 74011250636 HC RX REV CODE- 250/636: Performed by: NURSE PRACTITIONER

## 2020-07-13 PROCEDURE — 97110 THERAPEUTIC EXERCISES: CPT

## 2020-07-13 PROCEDURE — 74011250636 HC RX REV CODE- 250/636: Performed by: STUDENT IN AN ORGANIZED HEALTH CARE EDUCATION/TRAINING PROGRAM

## 2020-07-13 PROCEDURE — 76060000035 HC ANESTHESIA 2 TO 2.5 HR: Performed by: ORTHOPAEDIC SURGERY

## 2020-07-13 PROCEDURE — 77030003665 HC NDL SPN BBMI -A: Performed by: ANESTHESIOLOGY

## 2020-07-13 PROCEDURE — 77030029820: Performed by: ORTHOPAEDIC SURGERY

## 2020-07-13 PROCEDURE — 36415 COLL VENOUS BLD VENIPUNCTURE: CPT

## 2020-07-13 PROCEDURE — 74011000250 HC RX REV CODE- 250: Performed by: NURSE ANESTHETIST, CERTIFIED REGISTERED

## 2020-07-13 PROCEDURE — 76010000171 HC OR TIME 2 TO 2.5 HR INTENSV-TIER 1: Performed by: ORTHOPAEDIC SURGERY

## 2020-07-13 PROCEDURE — 97165 OT EVAL LOW COMPLEX 30 MIN: CPT

## 2020-07-13 PROCEDURE — 77030038149 HC BLD SAW SAG STRY -D: Performed by: ORTHOPAEDIC SURGERY

## 2020-07-13 PROCEDURE — 94760 N-INVAS EAR/PLS OXIMETRY 1: CPT

## 2020-07-13 PROCEDURE — 77030033067 HC SUT PDO STRATFX SPIR J&J -B: Performed by: ORTHOPAEDIC SURGERY

## 2020-07-13 PROCEDURE — 76942 ECHO GUIDE FOR BIOPSY: CPT | Performed by: ORTHOPAEDIC SURGERY

## 2020-07-13 PROCEDURE — 82962 GLUCOSE BLOOD TEST: CPT

## 2020-07-13 PROCEDURE — 77030012935 HC DRSG AQUACEL BMS -B: Performed by: ORTHOPAEDIC SURGERY

## 2020-07-13 PROCEDURE — 77030003602 HC NDL NRV BLK BBMI -B: Performed by: ANESTHESIOLOGY

## 2020-07-13 PROCEDURE — 74011250636 HC RX REV CODE- 250/636: Performed by: NURSE ANESTHETIST, CERTIFIED REGISTERED

## 2020-07-13 PROCEDURE — 77030002933 HC SUT MCRYL J&J -A: Performed by: ORTHOPAEDIC SURGERY

## 2020-07-13 PROCEDURE — 77030027520 HC SEAL BPLR AQMNTYS MEDT -F: Performed by: ORTHOPAEDIC SURGERY

## 2020-07-13 PROCEDURE — 77030029828 HC FEM TIB CKPNT KT DISP STRY -B: Performed by: ORTHOPAEDIC SURGERY

## 2020-07-13 PROCEDURE — 77030006835 HC BLD SAW SAG STRY -B: Performed by: ORTHOPAEDIC SURGERY

## 2020-07-13 PROCEDURE — 0SRC0JA REPLACEMENT OF RIGHT KNEE JOINT WITH SYNTHETIC SUBSTITUTE, UNCEMENTED, OPEN APPROACH: ICD-10-PCS | Performed by: ORTHOPAEDIC SURGERY

## 2020-07-13 PROCEDURE — 77030007880 HC KT SPN EPDRL BBMI -B: Performed by: ANESTHESIOLOGY

## 2020-07-13 PROCEDURE — 77030002922 HC SUT FBRWRE ARTH -B: Performed by: ORTHOPAEDIC SURGERY

## 2020-07-13 PROCEDURE — 8E0Y0CZ ROBOTIC ASSISTED PROCEDURE OF LOWER EXTREMITY, OPEN APPROACH: ICD-10-PCS | Performed by: ORTHOPAEDIC SURGERY

## 2020-07-13 PROCEDURE — 97535 SELF CARE MNGMENT TRAINING: CPT

## 2020-07-13 PROCEDURE — 74011250636 HC RX REV CODE- 250/636: Performed by: ORTHOPAEDIC SURGERY

## 2020-07-13 PROCEDURE — 97161 PT EVAL LOW COMPLEX 20 MIN: CPT

## 2020-07-13 PROCEDURE — 74011250637 HC RX REV CODE- 250/637: Performed by: NURSE PRACTITIONER

## 2020-07-13 PROCEDURE — 74011250637 HC RX REV CODE- 250/637: Performed by: ANESTHESIOLOGY

## 2020-07-13 PROCEDURE — 94762 N-INVAS EAR/PLS OXIMTRY CONT: CPT

## 2020-07-13 PROCEDURE — 77030027138 HC INCENT SPIROMETER -A

## 2020-07-13 DEVICE — KNEE K2 TOT HEMI ADV CMTLS -- IMPL CAPPED K2: Type: IMPLANTABLE DEVICE | Status: FUNCTIONAL

## 2020-07-13 DEVICE — INSERT TIB SZ 6 THK9MM UNIV KNEE POLYETH CNDYL STBL PRI NEUT: Type: IMPLANTABLE DEVICE | Site: KNEE | Status: FUNCTIONAL

## 2020-07-13 DEVICE — BASEPLATE TIB SZ 6 AP52MM ML77MM KNEE TRITANIUM 4 CRUCFRM: Type: IMPLANTABLE DEVICE | Site: KNEE | Status: FUNCTIONAL

## 2020-07-13 DEVICE — PAT ASYM MTL-BK 11MM SZ A38 -- TRIATHLON: Type: IMPLANTABLE DEVICE | Site: KNEE | Status: FUNCTIONAL

## 2020-07-13 DEVICE — IMPLANTABLE DEVICE: Type: IMPLANTABLE DEVICE | Site: KNEE | Status: FUNCTIONAL

## 2020-07-13 RX ORDER — TRANEXAMIC ACID 100 MG/ML
INJECTION, SOLUTION INTRAVENOUS AS NEEDED
Status: DISCONTINUED | OUTPATIENT
Start: 2020-07-13 | End: 2020-07-13 | Stop reason: HOSPADM

## 2020-07-13 RX ORDER — ONDANSETRON 8 MG/1
8 TABLET, ORALLY DISINTEGRATING ORAL
Qty: 30 TAB | Refills: 0 | Status: SHIPPED | OUTPATIENT
Start: 2020-07-13 | End: 2021-11-08

## 2020-07-13 RX ORDER — FENTANYL CITRATE 50 UG/ML
100 INJECTION, SOLUTION INTRAMUSCULAR; INTRAVENOUS ONCE
Status: COMPLETED | OUTPATIENT
Start: 2020-07-13 | End: 2020-07-13

## 2020-07-13 RX ORDER — KETOROLAC TROMETHAMINE 30 MG/ML
INJECTION, SOLUTION INTRAMUSCULAR; INTRAVENOUS AS NEEDED
Status: DISCONTINUED | OUTPATIENT
Start: 2020-07-13 | End: 2020-07-13 | Stop reason: HOSPADM

## 2020-07-13 RX ORDER — ONDANSETRON 8 MG/1
8 TABLET, ORALLY DISINTEGRATING ORAL
Status: DISCONTINUED | OUTPATIENT
Start: 2020-07-13 | End: 2020-07-14 | Stop reason: HOSPADM

## 2020-07-13 RX ORDER — ROPIVACAINE HYDROCHLORIDE 2 MG/ML
INJECTION, SOLUTION EPIDURAL; INFILTRATION; PERINEURAL
Status: COMPLETED | OUTPATIENT
Start: 2020-07-13 | End: 2020-07-13

## 2020-07-13 RX ORDER — SODIUM CHLORIDE, SODIUM LACTATE, POTASSIUM CHLORIDE, CALCIUM CHLORIDE 600; 310; 30; 20 MG/100ML; MG/100ML; MG/100ML; MG/100ML
75 INJECTION, SOLUTION INTRAVENOUS CONTINUOUS
Status: DISCONTINUED | OUTPATIENT
Start: 2020-07-13 | End: 2020-07-13 | Stop reason: HOSPADM

## 2020-07-13 RX ORDER — PROPOFOL 10 MG/ML
INJECTION, EMULSION INTRAVENOUS
Status: DISCONTINUED | OUTPATIENT
Start: 2020-07-13 | End: 2020-07-13 | Stop reason: HOSPADM

## 2020-07-13 RX ORDER — TRAZODONE HYDROCHLORIDE 50 MG/1
100 TABLET ORAL
Status: DISCONTINUED | OUTPATIENT
Start: 2020-07-13 | End: 2020-07-14 | Stop reason: HOSPADM

## 2020-07-13 RX ORDER — MELOXICAM 7.5 MG/1
7.5 TABLET ORAL DAILY
Qty: 30 TAB | Refills: 2 | Status: SHIPPED | OUTPATIENT
Start: 2020-07-13

## 2020-07-13 RX ORDER — ASPIRIN 81 MG/1
81 TABLET ORAL EVERY 12 HOURS
Status: DISCONTINUED | OUTPATIENT
Start: 2020-07-13 | End: 2020-07-14 | Stop reason: HOSPADM

## 2020-07-13 RX ORDER — SODIUM CHLORIDE 9 MG/ML
100 INJECTION, SOLUTION INTRAVENOUS CONTINUOUS
Status: DISCONTINUED | OUTPATIENT
Start: 2020-07-13 | End: 2020-07-14 | Stop reason: HOSPADM

## 2020-07-13 RX ORDER — CITALOPRAM 20 MG/1
20 TABLET, FILM COATED ORAL DAILY
Status: DISCONTINUED | OUTPATIENT
Start: 2020-07-14 | End: 2020-07-14 | Stop reason: HOSPADM

## 2020-07-13 RX ORDER — LEVETIRACETAM 500 MG/1
500 TABLET ORAL EVERY 12 HOURS
Status: DISCONTINUED | OUTPATIENT
Start: 2020-07-13 | End: 2020-07-14 | Stop reason: HOSPADM

## 2020-07-13 RX ORDER — LIDOCAINE HYDROCHLORIDE 10 MG/ML
0.1 INJECTION INFILTRATION; PERINEURAL AS NEEDED
Status: DISCONTINUED | OUTPATIENT
Start: 2020-07-13 | End: 2020-07-13 | Stop reason: HOSPADM

## 2020-07-13 RX ORDER — LORAZEPAM 0.5 MG/1
0.5 TABLET ORAL
Status: DISCONTINUED | OUTPATIENT
Start: 2020-07-13 | End: 2020-07-14 | Stop reason: HOSPADM

## 2020-07-13 RX ORDER — SODIUM CHLORIDE 0.9 % (FLUSH) 0.9 %
5-40 SYRINGE (ML) INJECTION EVERY 8 HOURS
Status: DISCONTINUED | OUTPATIENT
Start: 2020-07-13 | End: 2020-07-14 | Stop reason: HOSPADM

## 2020-07-13 RX ORDER — TRANEXAMIC ACID 650 1/1
1300 TABLET ORAL
Status: COMPLETED | OUTPATIENT
Start: 2020-07-13 | End: 2020-07-13

## 2020-07-13 RX ORDER — ONDANSETRON 2 MG/ML
4 INJECTION INTRAMUSCULAR; INTRAVENOUS
Status: DISCONTINUED | OUTPATIENT
Start: 2020-07-13 | End: 2020-07-14 | Stop reason: HOSPADM

## 2020-07-13 RX ORDER — OXYCODONE HYDROCHLORIDE 5 MG/1
5 TABLET ORAL
Status: DISCONTINUED | OUTPATIENT
Start: 2020-07-13 | End: 2020-07-14 | Stop reason: HOSPADM

## 2020-07-13 RX ORDER — ASPIRIN 81 MG/1
81 TABLET ORAL 2 TIMES DAILY
Qty: 60 TAB | Refills: 0 | Status: SHIPPED | OUTPATIENT
Start: 2020-07-13

## 2020-07-13 RX ORDER — DIPHENHYDRAMINE HCL 25 MG
25 CAPSULE ORAL
Status: DISCONTINUED | OUTPATIENT
Start: 2020-07-13 | End: 2020-07-14 | Stop reason: HOSPADM

## 2020-07-13 RX ORDER — OXYCODONE HYDROCHLORIDE 5 MG/1
5 TABLET ORAL
Status: DISCONTINUED | OUTPATIENT
Start: 2020-07-13 | End: 2020-07-13 | Stop reason: HOSPADM

## 2020-07-13 RX ORDER — OXYCODONE HYDROCHLORIDE 5 MG/1
5 TABLET ORAL
Qty: 40 TAB | Refills: 0 | Status: SHIPPED | OUTPATIENT
Start: 2020-07-13 | End: 2020-07-29

## 2020-07-13 RX ORDER — CEFAZOLIN SODIUM/WATER 2 G/20 ML
2 SYRINGE (ML) INTRAVENOUS EVERY 8 HOURS
Status: COMPLETED | OUTPATIENT
Start: 2020-07-13 | End: 2020-07-14

## 2020-07-13 RX ORDER — ACETAMINOPHEN 500 MG
1000 TABLET ORAL
Qty: 60 TAB | Refills: 0 | Status: SHIPPED | OUTPATIENT
Start: 2020-07-13 | End: 2020-10-06

## 2020-07-13 RX ORDER — SODIUM CHLORIDE, SODIUM LACTATE, POTASSIUM CHLORIDE, CALCIUM CHLORIDE 600; 310; 30; 20 MG/100ML; MG/100ML; MG/100ML; MG/100ML
INJECTION, SOLUTION INTRAVENOUS
Status: DISCONTINUED | OUTPATIENT
Start: 2020-07-13 | End: 2020-07-13 | Stop reason: HOSPADM

## 2020-07-13 RX ORDER — NYSTATIN 100000 [USP'U]/ML
500000 SUSPENSION ORAL
Status: DISCONTINUED | OUTPATIENT
Start: 2020-07-13 | End: 2020-07-13

## 2020-07-13 RX ORDER — PANTOPRAZOLE SODIUM 40 MG/1
40 TABLET, DELAYED RELEASE ORAL
Status: DISCONTINUED | OUTPATIENT
Start: 2020-07-14 | End: 2020-07-14 | Stop reason: HOSPADM

## 2020-07-13 RX ORDER — HYDROMORPHONE HYDROCHLORIDE 1 MG/ML
0.5 INJECTION, SOLUTION INTRAMUSCULAR; INTRAVENOUS; SUBCUTANEOUS
Status: DISCONTINUED | OUTPATIENT
Start: 2020-07-13 | End: 2020-07-14 | Stop reason: HOSPADM

## 2020-07-13 RX ORDER — AMOXICILLIN 250 MG
1 CAPSULE ORAL DAILY
Qty: 30 TAB | Refills: 0 | Status: SHIPPED | OUTPATIENT
Start: 2020-07-13 | End: 2021-08-26

## 2020-07-13 RX ORDER — ACETAMINOPHEN 500 MG
1000 TABLET ORAL EVERY 6 HOURS
Status: DISCONTINUED | OUTPATIENT
Start: 2020-07-13 | End: 2020-07-14 | Stop reason: HOSPADM

## 2020-07-13 RX ORDER — LOSARTAN POTASSIUM 25 MG/1
25 TABLET ORAL
Status: DISCONTINUED | OUTPATIENT
Start: 2020-07-13 | End: 2020-07-14 | Stop reason: HOSPADM

## 2020-07-13 RX ORDER — HYOSCYAMINE SULFATE 0.12 MG/1
0.12 TABLET SUBLINGUAL
Status: DISCONTINUED | OUTPATIENT
Start: 2020-07-13 | End: 2020-07-13

## 2020-07-13 RX ORDER — SODIUM CHLORIDE 0.9 % (FLUSH) 0.9 %
5-40 SYRINGE (ML) INJECTION AS NEEDED
Status: DISCONTINUED | OUTPATIENT
Start: 2020-07-13 | End: 2020-07-14 | Stop reason: HOSPADM

## 2020-07-13 RX ORDER — CEFAZOLIN SODIUM 1 G/3ML
INJECTION, POWDER, FOR SOLUTION INTRAMUSCULAR; INTRAVENOUS AS NEEDED
Status: DISCONTINUED | OUTPATIENT
Start: 2020-07-13 | End: 2020-07-13 | Stop reason: HOSPADM

## 2020-07-13 RX ORDER — CELECOXIB 200 MG/1
200 CAPSULE ORAL ONCE
Status: COMPLETED | OUTPATIENT
Start: 2020-07-13 | End: 2020-07-13

## 2020-07-13 RX ORDER — DEXAMETHASONE SODIUM PHOSPHATE 4 MG/ML
INJECTION, SOLUTION INTRA-ARTICULAR; INTRALESIONAL; INTRAMUSCULAR; INTRAVENOUS; SOFT TISSUE
Status: COMPLETED | OUTPATIENT
Start: 2020-07-13 | End: 2020-07-13

## 2020-07-13 RX ORDER — NALOXONE HYDROCHLORIDE 0.4 MG/ML
.2-.4 INJECTION, SOLUTION INTRAMUSCULAR; INTRAVENOUS; SUBCUTANEOUS
Status: DISCONTINUED | OUTPATIENT
Start: 2020-07-13 | End: 2020-07-14 | Stop reason: HOSPADM

## 2020-07-13 RX ORDER — ROPIVACAINE HYDROCHLORIDE 2 MG/ML
INJECTION, SOLUTION EPIDURAL; INFILTRATION; PERINEURAL AS NEEDED
Status: DISCONTINUED | OUTPATIENT
Start: 2020-07-13 | End: 2020-07-13 | Stop reason: HOSPADM

## 2020-07-13 RX ORDER — DEXAMETHASONE SODIUM PHOSPHATE 4 MG/ML
INJECTION, SOLUTION INTRA-ARTICULAR; INTRALESIONAL; INTRAMUSCULAR; INTRAVENOUS; SOFT TISSUE AS NEEDED
Status: DISCONTINUED | OUTPATIENT
Start: 2020-07-13 | End: 2020-07-13 | Stop reason: HOSPADM

## 2020-07-13 RX ORDER — HYDROMORPHONE HYDROCHLORIDE 2 MG/ML
0.5 INJECTION, SOLUTION INTRAMUSCULAR; INTRAVENOUS; SUBCUTANEOUS
Status: DISCONTINUED | OUTPATIENT
Start: 2020-07-13 | End: 2020-07-13 | Stop reason: HOSPADM

## 2020-07-13 RX ORDER — SODIUM CHLORIDE, SODIUM LACTATE, POTASSIUM CHLORIDE, CALCIUM CHLORIDE 600; 310; 30; 20 MG/100ML; MG/100ML; MG/100ML; MG/100ML
100 INJECTION, SOLUTION INTRAVENOUS CONTINUOUS
Status: DISCONTINUED | OUTPATIENT
Start: 2020-07-13 | End: 2020-07-13 | Stop reason: HOSPADM

## 2020-07-13 RX ORDER — METOPROLOL SUCCINATE 100 MG/1
100 TABLET, EXTENDED RELEASE ORAL
Status: DISCONTINUED | OUTPATIENT
Start: 2020-07-13 | End: 2020-07-14 | Stop reason: HOSPADM

## 2020-07-13 RX ORDER — ACETAMINOPHEN 500 MG
1000 TABLET ORAL ONCE
Status: COMPLETED | OUTPATIENT
Start: 2020-07-13 | End: 2020-07-13

## 2020-07-13 RX ORDER — AMOXICILLIN 250 MG
2 CAPSULE ORAL DAILY
Status: DISCONTINUED | OUTPATIENT
Start: 2020-07-14 | End: 2020-07-14 | Stop reason: HOSPADM

## 2020-07-13 RX ORDER — CEFAZOLIN SODIUM/WATER 2 G/20 ML
2 SYRINGE (ML) INTRAVENOUS ONCE
Status: DISCONTINUED | OUTPATIENT
Start: 2020-07-13 | End: 2020-07-13 | Stop reason: HOSPADM

## 2020-07-13 RX ORDER — DEXAMETHASONE SODIUM PHOSPHATE 100 MG/10ML
10 INJECTION INTRAMUSCULAR; INTRAVENOUS ONCE
Status: DISCONTINUED | OUTPATIENT
Start: 2020-07-14 | End: 2020-07-14 | Stop reason: HOSPADM

## 2020-07-13 RX ORDER — POLYETHYLENE GLYCOL 3350 17 G/17G
17 POWDER, FOR SOLUTION ORAL DAILY
Status: DISCONTINUED | OUTPATIENT
Start: 2020-07-14 | End: 2020-07-14 | Stop reason: HOSPADM

## 2020-07-13 RX ORDER — MELOXICAM 7.5 MG/1
7.5 TABLET ORAL 2 TIMES DAILY
Status: DISCONTINUED | OUTPATIENT
Start: 2020-07-15 | End: 2020-07-14 | Stop reason: HOSPADM

## 2020-07-13 RX ORDER — KETOROLAC TROMETHAMINE 15 MG/ML
15 INJECTION, SOLUTION INTRAMUSCULAR; INTRAVENOUS EVERY 8 HOURS
Status: DISCONTINUED | OUTPATIENT
Start: 2020-07-13 | End: 2020-07-14 | Stop reason: HOSPADM

## 2020-07-13 RX ORDER — ONDANSETRON 2 MG/ML
INJECTION INTRAMUSCULAR; INTRAVENOUS AS NEEDED
Status: DISCONTINUED | OUTPATIENT
Start: 2020-07-13 | End: 2020-07-13 | Stop reason: HOSPADM

## 2020-07-13 RX ORDER — NALOXONE HYDROCHLORIDE 0.4 MG/ML
0.2 INJECTION, SOLUTION INTRAMUSCULAR; INTRAVENOUS; SUBCUTANEOUS AS NEEDED
Status: DISCONTINUED | OUTPATIENT
Start: 2020-07-13 | End: 2020-07-13 | Stop reason: HOSPADM

## 2020-07-13 RX ORDER — ASCORBIC ACID 500 MG
1000 TABLET ORAL DAILY
Status: DISCONTINUED | OUTPATIENT
Start: 2020-07-14 | End: 2020-07-13

## 2020-07-13 RX ORDER — MIDAZOLAM HYDROCHLORIDE 1 MG/ML
2 INJECTION, SOLUTION INTRAMUSCULAR; INTRAVENOUS ONCE
Status: DISCONTINUED | OUTPATIENT
Start: 2020-07-13 | End: 2020-07-13 | Stop reason: HOSPADM

## 2020-07-13 RX ADMIN — ACETAMINOPHEN 1000 MG: 500 TABLET, FILM COATED ORAL at 10:43

## 2020-07-13 RX ADMIN — TRANEXAMIC ACID 1300 MG: 650 TABLET ORAL at 19:07

## 2020-07-13 RX ADMIN — PROPOFOL 50 MCG/KG/MIN: 10 INJECTION, EMULSION INTRAVENOUS at 13:23

## 2020-07-13 RX ADMIN — SODIUM CHLORIDE, SODIUM LACTATE, POTASSIUM CHLORIDE, AND CALCIUM CHLORIDE 100 ML/HR: 600; 310; 30; 20 INJECTION, SOLUTION INTRAVENOUS at 10:49

## 2020-07-13 RX ADMIN — ONDANSETRON 4 MG: 2 INJECTION INTRAMUSCULAR; INTRAVENOUS at 14:38

## 2020-07-13 RX ADMIN — Medication 2 G: at 22:30

## 2020-07-13 RX ADMIN — LOSARTAN POTASSIUM 25 MG: 25 TABLET ORAL at 22:56

## 2020-07-13 RX ADMIN — ACETAMINOPHEN 1000 MG: 500 TABLET, FILM COATED ORAL at 19:07

## 2020-07-13 RX ADMIN — MEPIVACAINE HYDROCHLORIDE 3 ML: 20 INJECTION, SOLUTION EPIDURAL; INFILTRATION at 13:19

## 2020-07-13 RX ADMIN — METOPROLOL SUCCINATE 100 MG: 100 TABLET, EXTENDED RELEASE ORAL at 22:56

## 2020-07-13 RX ADMIN — TRANEXAMIC ACID 1000 MG: 100 INJECTION, SOLUTION INTRAVENOUS at 14:37

## 2020-07-13 RX ADMIN — CELECOXIB 200 MG: 200 CAPSULE ORAL at 10:43

## 2020-07-13 RX ADMIN — DEXAMETHASONE SODIUM PHOSPHATE 5 MG: 4 INJECTION, SOLUTION INTRAMUSCULAR; INTRAVENOUS at 13:36

## 2020-07-13 RX ADMIN — ROPIVACAINE HYDROCHLORIDE 20 MG: 2 INJECTION, SOLUTION EPIDURAL; INFILTRATION at 12:43

## 2020-07-13 RX ADMIN — DEXAMETHASONE SODIUM PHOSPHATE 5 MG: 4 INJECTION, SOLUTION INTRAMUSCULAR; INTRAVENOUS at 12:43

## 2020-07-13 RX ADMIN — CEFAZOLIN 2 G: 1 INJECTION, POWDER, FOR SOLUTION INTRAMUSCULAR; INTRAVENOUS; PARENTERAL at 13:08

## 2020-07-13 RX ADMIN — LEVETIRACETAM 500 MG: 500 TABLET ORAL at 21:13

## 2020-07-13 RX ADMIN — FENTANYL CITRATE 50 MCG: 50 INJECTION, SOLUTION INTRAMUSCULAR; INTRAVENOUS at 12:44

## 2020-07-13 RX ADMIN — SODIUM CHLORIDE, SODIUM LACTATE, POTASSIUM CHLORIDE, AND CALCIUM CHLORIDE: 600; 310; 30; 20 INJECTION, SOLUTION INTRAVENOUS at 13:06

## 2020-07-13 RX ADMIN — SODIUM CHLORIDE, SODIUM LACTATE, POTASSIUM CHLORIDE, AND CALCIUM CHLORIDE: 600; 310; 30; 20 INJECTION, SOLUTION INTRAVENOUS at 13:44

## 2020-07-13 RX ADMIN — TRANEXAMIC ACID 1300 MG: 650 TABLET ORAL at 21:13

## 2020-07-13 RX ADMIN — KETOROLAC TROMETHAMINE 15 MG: 15 INJECTION, SOLUTION INTRAMUSCULAR; INTRAVENOUS at 22:55

## 2020-07-13 RX ADMIN — TRANEXAMIC ACID 1000 MG: 100 INJECTION, SOLUTION INTRAVENOUS at 13:16

## 2020-07-13 RX ADMIN — Medication 1 AMPULE: at 21:13

## 2020-07-13 RX ADMIN — TRAZODONE HYDROCHLORIDE 100 MG: 50 TABLET ORAL at 22:56

## 2020-07-13 RX ADMIN — Medication 3 AMPULE: at 10:44

## 2020-07-13 RX ADMIN — ASPIRIN 81 MG: 81 TABLET, COATED ORAL at 21:13

## 2020-07-13 NOTE — ANESTHESIA PREPROCEDURE EVALUATION
Relevant Problems   No relevant active problems       Anesthetic History   No history of anesthetic complications            Review of Systems / Medical History  Patient summary reviewed and pertinent labs reviewed    Pulmonary  Within defined limits                 Neuro/Psych              Cardiovascular    Hypertension: poorly controlled        Dysrhythmias (Resolved currently) : atrial fibrillation  Hyperlipidemia    Exercise tolerance: >4 METS  Comments: Frequent falls. 4/2020 found down after 20+ hours. Pt was in A fib with RVR at the time. Also had slightly elevated troponin. Cleared since and currently in NSR. ECHO WNL at that time.   Denies CP or changes in functional status   GI/Hepatic/Renal     GERD: well controlled           Endo/Other        Obesity and arthritis     Other Findings   Comments: Frequent falls  Suprapubic tube         Physical Exam    Airway  Mallampati: II  TM Distance: 4 - 6 cm  Neck ROM: normal range of motion   Mouth opening: Normal     Cardiovascular    Rhythm: regular  Rate: normal         Dental    Dentition: Full lower dentures and Full upper dentures     Pulmonary  Breath sounds clear to auscultation               Abdominal  GI exam deferred       Other Findings            Anesthetic Plan    ASA: 3  Anesthesia type: spinal      Post-op pain plan if not by surgeon: peripheral nerve block single      Anesthetic plan and risks discussed with: Patient

## 2020-07-13 NOTE — PROGRESS NOTES
TRANSFER - IN REPORT:    Verbal report received from Gaby Chadwick on Orville Lai  being received from PACU for routine post - op      Report consisted of patients Situation, Background, Assessment and   Recommendations(SBAR). Information from the following report(s) SBAR, Kardex, OR Summary, Procedure Summary, Intake/Output, MAR, Accordion, Recent Results and Med Rec Status was reviewed with the receiving nurse. Opportunity for questions and clarification was provided. Assessment completed upon patients arrival to unit and care assumed.

## 2020-07-13 NOTE — ANESTHESIA PROCEDURE NOTES
Spinal Block    Start time: 7/13/2020 1:15 PM  End time: 7/13/2020 1:19 PM  Performed by: Esteban Dubon MD  Authorized by: Esteban Dubon MD     Pre-procedure:   Indications: at surgeon's request and primary anesthetic  Preanesthetic Checklist: patient identified, risks and benefits discussed, anesthesia consent, site marked, patient being monitored and timeout performed    Timeout Time: 13:15          Spinal Block:   Patient Position:  Seated  Prep Region:  Lumbar  Prep: chlorhexidine and patient draped      Location:  L3-4  Technique:  Single shot    Local Dose (mL):  3    Needle:   Needle Type:  Pencan  Needle Gauge:  25 G  Attempts:  1      Events: CSF confirmed, no blood with aspiration and no paresthesia        Assessment:  Insertion:  Uncomplicated  Patient tolerance:  Patient tolerated the procedure well with no immediate complications

## 2020-07-13 NOTE — PROGRESS NOTES
Admission assessment complete. Patient in bed. Instructed on use of lighting, menu, fall risk and incentive spirometer 10x hourly while awake. Dressing dry and intact. Lower extremities pharmacologically paralyzed, warm with palpable pulses bilaterally. Negative dorsiflexion and plantar flexion. Patient denies needs at present. Instructed not to get up by themselves and call for assistance or any needs. Patient verbalized understanding. Call bell within reach. Side rails up x3. Bed low and locked. No distress noted. BED ALARM ON.

## 2020-07-13 NOTE — PROGRESS NOTES
Problem: Self Care Deficits Care Plan (Adult)  Goal: *Acute Goals and Plan of Care (Insert Text)  Description: GOALS:   DISCHARGE GOALS (in preparation for going home/rehab):  3 days  1. Mr. Phil DeL a Rosa will perform one lower body dressing activity with minimal assistance required to demonstrate improved functional mobility and safety. 2.  Mr. Phil De La Rosa will perform one lower body bathing activity with minimal assistance required to demonstrate improved functional mobility and safety. 3.  Mr. Phil De La Rosa will perform toileting/toilet transfer with contact guard assistance to demonstrate improved functional mobility and safety. 4.  Mr. Phil De La Rosa will perform shower transfer with contact guard assistance to demonstrate improved functional mobility and safety. Outcome: Progressing Towards Goal     JOINT CAMP OCCUPATIONAL THERAPY TKA: Initial Assessment, Daily Note, Treatment Day: Day of Assessment, and PM 7/13/2020  INPATIENT: Hospital Day: 1  Payor: [facility] MEDICARE / Plan: [facility] MEDICARE HMO/PPO / Product Type: Managed Care Medicare /      NAME/AGE/GENDER: Joe Lewis is a 80 y.o. male   PRIMARY DIAGNOSIS:  Osteoarthritis of right knee, unspecified osteoarthritis type [M17.11]  Acquired deformity of knee, right [M21.961]   Procedure(s) and Anesthesia Type:     * RIGHT KNEE ARTHROPLASTY TOTAL ROBOTIC ASSISTED - Spinal (Right)  ICD-10: Treatment Diagnosis:    Pain in Right Knee (M25.561)  Stiffness of Right Knee, Not elsewhere classified (E00.902)      ASSESSMENT:     Mr. Phil De La Rosa is s/p right TKA and presents with decreased weight bearing on right LE and decreased independence with functional mobility and activities of daily living as compared to baseline level of function and safety. Patient would benefit from skilled Occupational Therapy to maximize independence and safety with self-care task and functional mobility.   Pt would also benefit from education on adaptive equipment and safety precautions in preparation for going home. Pt seen in room with PT for part of the treatment due to falls risk. Pt sat edge of bed for dressing and up in room with minimal assistance and verbal cues for safety. Pt was a bit impulsive, therefore, was put on a posey pad in the recliner. Unsure if pad was working correctly PCT Cristianawerner Hancock) notified and made aware of his fall risk. This section established at most recent assessment   PROBLEM LIST (Impairments causing functional limitations):  Decreased Strength  Decreased ADL/Functional Activities  Decreased Transfer Abilities  Increased Pain  Increased Fatigue  Decreased Flexibility/Joint Mobility  Decreased Knowledge of Precautions   INTERVENTIONS PLANNED: (Benefits and precautions of occupational therapy have been discussed with the patient.)  Activities of daily living training  Adaptive equipment training  Balance training  Clothing management  Donning&doffing training  Theraputic activity     TREATMENT PLAN: Frequency/Duration: Follow patient 1-2 times to address above goals. Rehabilitation Potential For Stated Goals: Good     RECOMMENDED REHABILITATION/EQUIPMENT: (at time of discharge pending progress): Continue Skilled Therapy. OCCUPATIONAL PROFILE AND HISTORY:   History of Present Injury/Illness (Reason for Referral): Pt presents this date s/p (right) TKA. Past Medical History/Comorbidities:   Mr. Chioma Elaine  has a past medical history of Acute lumbar radiculopathy, Arthritis, Ascending aorta dilatation ([address] Atrial fibrillation with RVR ([address] Enlarged prostate, Martinez catheter in place, GERD (gastroesophageal reflux disease), High cholesterol, Hypertension, ICH (intracerebral hemorrhage) ([address] Lumbar stenosis with neurogenic claudication, Other forms of scoliosis, lumbar region, Poor historian, Psychiatric disorder, Seizures ([address] Tongue lesion, and Urinary frequency.   Mr. Chioma Elaine  has a past surgical history that includes hx other surgical; hx turp (10/20/11); hx heent (8/2012); hx orthopaedic (Right); and hx other surgical.  Social History/Living Environment:      Prior Level of Function/Work/Activity:  Independent at home with Rolator. . noted falls at home       Number of Personal Factors/Comorbidities that affect the Plan of Care: Brief history (0):  LOW COMPLEXITY   ASSESSMENT OF OCCUPATIONAL PERFORMANCE[de-identified]   Most Recent Physical Functioning:   Balance  Sitting: Intact  Standing: Pull to stand; With support                    Coordination  Fine Motor Skills-Upper: Left Intact; Right Intact  Gross Motor Skills-Upper: Left Intact; Right Intact         Mental Status  Neurologic State: Alert  Orientation Level: Oriented X4  Cognition: Appropriate decision making; Follows commands  Perception: Appears intact  Perseveration: No perseveration noted  Safety/Judgement: Awareness of environment                Basic ADLs (From Assessment) Complex ADLs (From Assessment)   Basic ADL  Feeding: Independent  Oral Facial Hygiene/Grooming: Supervision  Bathing: Moderate assistance  Upper Body Dressing: Stand-by assistance  Lower Body Dressing: Moderate assistance  Toileting: Moderate assistance     Grooming/Bathing/Dressing Activities of Daily Living     Cognitive Retraining  Safety/Judgement: Awareness of environment                 Functional Transfers  Bathroom Mobility: Moderate assistance  Toilet Transfer : Moderate assistance  Shower Transfer: Moderate assistance     Bed/Mat Mobility  Supine to Sit: Contact guard assistance  Sit to Stand: Minimum assistance  Stand to Sit: Minimum assistance  Bed to Chair: Minimum assistance  Scooting:  Additional time         Physical Skills Involved:  Range of Motion  Balance  Strength Cognitive Skills Affected (resulting in the inability to perform in a timely and safe manner):  Sustained Attention Psychosocial Skills Affected:  Environmental Adaptation   Number of elements that affect the Plan of Care: 3-5:  MODERATE COMPLEXITY CLINICAL DECISION MAKIN[address] [place] [address] AM-PAC 6 Clicks   Daily Activity Inpatient Short Form  How much help from another person does the patient currently need. .. Total A Lot A Little None   1. Putting on and taking off regular lower body clothing? [] 1   [x] 2   [] 3   [] 4   2. Bathing (including washing, rinsing, drying)? [] 1   [x] 2   [] 3   [] 4   3. Toileting, which includes using toilet, bedpan or urinal?   [] 1   [x] 2   [] 3   [] 4   4. Putting on and taking off regular upper body clothing? [] 1   [] 2   [] 3   [x] 4   5. Taking care of personal grooming such as brushing teeth? [] 1   [] 2   [] 3   [x] 4   6. Eating meals? [] 1   [] 2   [] 3   [x] 4   © , Trustees of [address] [place] [address], under license to [employer]. All rights reserved     Score:  Initial: 18 Most Recent: X (Date: -- )    Interpretation of Tool:  Represents activities that are increasingly more difficult (i.e. Bed mobility, Transfers, Gait). Use of outcome tool(s) and clinical judgement create a POC that gives a: LOW COMPLEXITY            TREATMENT:   (In addition to Assessment/Re-Assessment sessions the following treatments were rendered)     Pre-treatment Symptoms/Complaints:  pt without complaint of pain  Pain: Initial:   Pain Intensity 1: 0 0 Post Session:  0     Self Care: (10 min): Procedure(s) (per grid) utilized to improve and/or restore self-care/home management as related to dressing. Required moderate verbal and manual cueing to facilitate activities of daily living skills and compensatory activities. OT evaluation completed  Treatment/Session Assessment:     Response to Treatment:  pt up in chair tolerated well.     Education:  [] Home Exercises  [x] Fall Precautions  [] Hip Precautions [] Going Home Video  [x] Knee/Hip Prosthesis Review  [x] Walker Management/Safety [x] Adaptive Equipment as Needed       Interdisciplinary Collaboration:   Physical Therapist  Occupational Therapist  Registered Nurse    After treatment position/precautions:   Up in chair  Bed/Chair-wheels locked  Call light within reach  RN notified     Compliance with Program/Exercises: Compliant all of the time, Will assess as treatment progresses. Recommendations/Intent for next treatment session:  Treatment next visit will focus on increasing Mr. Karla Powell independence with bed mobility, transfers, self care, functional mobility, modalities for pain, and patient education.       Total Treatment Duration:  OT Patient Time In/Time Out  Time In: 1700  Time Out: [address], OT

## 2020-07-13 NOTE — ANESTHESIA POSTPROCEDURE EVALUATION
Procedure(s):  RIGHT KNEE ARTHROPLASTY TOTAL ROBOTIC ASSISTED.    spinal    Anesthesia Post Evaluation      Multimodal analgesia: multimodal analgesia used between 6 hours prior to anesthesia start to PACU discharge  Patient location during evaluation: PACU  Patient participation: complete - patient participated  Level of consciousness: awake and alert  Pain management: adequate  Airway patency: patent  Anesthetic complications: no  Cardiovascular status: acceptable  Respiratory status: acceptable  Hydration status: acceptable  Post anesthesia nausea and vomiting:  controlled  Final Post Anesthesia Temperature Assessment:  Normothermia (36.0-37.5 degrees C)      INITIAL Post-op Vital signs:   Vitals Value Taken Time   /80 7/13/2020  4:04 PM   Temp 36.6 °C (97.9 °F) 7/13/2020  3:19 PM   Pulse 71 7/13/2020  4:04 PM   Resp 15 7/13/2020  4:04 PM   SpO2 97 % 7/13/2020  4:04 PM

## 2020-07-13 NOTE — PROGRESS NOTES
07/13/20 1735   Oxygen Therapy   O2 Sat (%) 91 %   Pulse via Oximetry 82 beats per minute   O2 Device Room air   O2 Flow Rate (L/min) 0 l/min   Patient achieved  2000     Ml/sec on IS. Patient encouraged to do 10 breaths every hour while awake-patient agreed and demonstrated. No shortness of breath or distress noted. BS are clear b/l. [facility] [place] notes reviewed- Sat monitor ordered HS.

## 2020-07-13 NOTE — PROGRESS NOTES
Problem: Mobility Impaired (Adult and Pediatric)  Goal: *Acute Goals and Plan of Care (Insert Text)  Description: GOALS (1-4 days):  (1.)Mr. Kevan Valdez will move from supine to sit and sit to supine  in bed with SUPERVISION. (2.)Mr. Kevan Valdez will transfer from bed to chair and chair to bed with STAND BY ASSIST using the least restrictive device. (3.)Mr. Kevan Valdez will ambulate with STAND BY ASSIST for 200 feet with the least restrictive device. (4.)Mr. Kevan Valdez will ambulate up/down 3 steps with bilateral  railing with CONTACT GUARD ASSIST with no device. (for practice, pt has no stairs)  (5.)Mr. Kevan Valdez will increase right knee ROM to 5°-80°.  ________________________________________________________________________________________________   Outcome: Progressing Towards Goal     PHYSICAL THERAPY JOINT CAMP TKA: Initial Assessment, Treatment Day: Day of Assessment, and PM 7/13/2020  INPATIENT: Hospital Day: 1  Payor: [facility] MEDICARE / Plan: [facility] MEDICARE HMO/PPO / Product Type: Managed Care Medicare /      NAME/AGE/GENDER: Yaritza Boyd is a 80 y.o. male   PRIMARY DIAGNOSIS:  Osteoarthritis of right knee, unspecified osteoarthritis type [M17.11]  Acquired deformity of knee, right [M21.961]   Procedure(s) and Anesthesia Type:     * RIGHT KNEE ARTHROPLASTY TOTAL ROBOTIC ASSISTED - Spinal (Right)  ICD-10: Treatment Diagnosis:    Pain in Right Knee (M25.561)  Stiffness of Right Knee, Not elsewhere classified (M25.661)  Difficulty in walking, Not elsewhere classified (R26.2)      ASSESSMENT:     Mr. Kevan Valdez presents with impaired strength & mobility s/p right TKA. Pt also has decreased stability with out of bed activity. This pt will benefit from follow up therapy to help restore safe function prior to returning home with caregiver. If no caregiveravailable in the home with pt on DC, this pt will need SNF rehab, he is a high fall risk & should not be left home alone.  Also PT informed RN that posey alarm was in place but not working & needed this to be addressed quickly to avoid this pt getting up unexpectedly. This section established at most recent assessment   PROBLEM LIST (Impairments causing functional limitations):  Decreased Strength  Decreased ADL/Functional Activities  Decreased Transfer Abilities  Decreased Ambulation Ability/Technique  Decreased Balance  Increased Pain  Decreased Flexibility/Joint Mobility  Decreased [place] with Home Exercise Program   INTERVENTIONS PLANNED: (Benefits and precautions of physical therapy have been discussed with the patient.)  bed mobility  gait training  home exercise program (HEP)  Range of Motion: active/assisted/passive  Therapeutic Activities  therapeutic exercise/strengthening  transfer training  Group Therapy     TREATMENT PLAN: Frequency/Duration: Follow patient BID for duration of hospital stay to address above goals. Rehabilitation Potential For Stated Goals: Good     RECOMMENDED REHABILITATION/EQUIPMENT: (at time of discharge pending progress): Continue Skilled Therapy and Home Health: Physical Therapy. HISTORY:   History of Present Injury/Illness (Reason for Referral):  Right TKA  Past Medical History/Comorbidities:   Mr. Trenton Porras  has a past medical history of Acute lumbar radiculopathy, Arthritis, Ascending aorta dilatation ([address] Atrial fibrillation with RVR ([address] Enlarged prostate, Martinez catheter in place, GERD (gastroesophageal reflux disease), High cholesterol, Hypertension, ICH (intracerebral hemorrhage) ([address] Lumbar stenosis with neurogenic claudication, Other forms of scoliosis, lumbar region, Poor historian, Psychiatric disorder, Seizures ([address] Tongue lesion, and Urinary frequency.   Mr. Trenton Porras  has a past surgical history that includes hx other surgical; hx turp (10/20/11); hx heent (8/2012); hx orthopaedic (Right); and hx other surgical.  Social History/Living Environment:   Home Environment: Private residence  # Steps to Enter: 0  Wheelchair Ramp: Yes  One/Two Story Residence: One story  Living Alone: Yes  Support Systems: Family member(s)  Patient Expects to be Discharged to[de-identified] Private residence  Current DME Used/Available at Home: Walker, rollator  Prior Level of Function/Work/Activity:  Pt was functioning in the home using a Rolator with falls reported prior to this admission. Number of Personal Factors/Comorbidities that affect the Plan of Care: 3+: HIGH COMPLEXITY   EXAMINATION:   Most Recent Physical Functioning:   Gross Assessment: Yes  Gross Assessment  AROM: Generally decreased, functional(left LE)  Strength: Generally decreased, functional(left LE)  Coordination: Generally decreased, functional(left LE)         RLE PROM  R Knee Flexion: 60(~post op)  R Knee Extension: -15(~post op)           Bed Mobility  Supine to Sit: Contact guard assistance  Sit to Supine: (NT)  Scooting: Contact guard assistance    Transfers  Sit to Stand: Minimum assistance  Stand to Sit: Minimum assistance  Bed to Chair: Minimum assistance(with walker)    Balance  Sitting: Intact; Without support  Standing: Impaired; With support(walker)              Weight Bearing Status  Right Side Weight Bearing: As tolerated  Distance (ft): 40 Feet (ft)  Ambulation - Level of Assistance: Minimal assistance  Assistive Device: Walker, rolling  Speed/Elena: Delayed  Step Length: Left shortened  Stance: Right decreased  Gait Abnormalities: Antalgic;Decreased step clearance        Braces/Orthotics: none    Right Knee Cold  Type: Cryocuff      Body Structures Involved:  Joints  Muscles Body Functions Affected:  Sensory/Pain  Movement Related Activities and Participation Affected:  General Tasks and Demands  Mobility   Number of elements that affect the Plan of Care: 4+: HIGH COMPLEXITY   CLINICAL PRESENTATION:   Presentation: Stable and uncomplicated: LOW COMPLEXITY   CLINICAL DECISION MAKING:   [employer] AM-PAC 6 Clicks   Basic Mobility Inpatient Short Form  How much difficulty does the patient currently have. .. Unable A Lot A Little None   1. Turning over in bed (including adjusting bedclothes, sheets and blankets)? [] 1   [] 2   [x] 3   [] 4   2. Sitting down on and standing up from a chair with arms ( e.g., wheelchair, bedside commode, etc.)   [] 1   [] 2   [x] 3   [] 4   3. Moving from lying on back to sitting on the side of the bed? [] 1   [] 2   [x] 3   [] 4   How much help from another person does the patient currently need. .. Total A Lot A Little None   4. Moving to and from a bed to a chair (including a wheelchair)? [] 1   [] 2   [x] 3   [] 4   5. Need to walk in hospital room? [] 1   [] 2   [x] 3   [] 4   6. Climbing 3-5 steps with a railing? [x] 1   [] 2   [] 3   [] 4   © 2007, Trustees of [address] [place] [address], under license to [employer]. All rights reserved     Score:  Initial: 16 Most Recent: X (Date: -- )    Interpretation of Tool:  Represents activities that are increasingly more difficult (i.e. Bed mobility, Transfers, Gait). Medical Necessity:     Patient is expected to demonstrate progress in   strength, range of motion, balance, coordination, and functional technique   to   decrease assistance required with bed mobility, transfers & gait  . Reason for Services/Other Comments:  Patient continues to require skilled intervention due to   Pt not safe with functional mobility  .    Use of outcome tool(s) and clinical judgement create a POC that gives a: Clear prediction of patient's progress: LOW COMPLEXITY            TREATMENT:   (In addition to Assessment/Re-Assessment sessions the following treatments were rendered)     Pre-treatment Symptoms/Complaints:  \" I can walk without a walker \"  Pain Initial: numeric scale  Pain Intensity 1: 3  Pain Location 1: Knee  Pain Orientation 1: Right  Pain Intervention(s) 1: Ambulation/Increased Activity, Cold pack  Post Session:  3/10     Therapeutic Exercise: (12 Minutes):  Exercises per grid below to improve mobility and dynamic movement of leg - right to improve functional endurance . Required minimal verbal cues to promote proper body alignment and promote proper body mechanics. Progressed range and repetitions as indicated. Assessment/ 11 min     Date:  7/13 Date:   Date:     ACTIVITY/EXERCISE AM PM AM PM AM PM   GROUP THERAPY  []  []  []  []  []  []   Ankle Pumps  10       Quad Sets  10       Gluteal Sets  10       Hip ABd/ADduction  10       Straight Leg Raises  10       Knee Slides  10       Short Arc Quads  10       Long Arc Quads         Chair Slides                  B = bilateral; AA = active assistive; A = active; P = passive      Treatment/Session Assessment:     Response to Treatment:  tolerated well but impulsive. Education:  [x] Home Exercises  [x] Fall Precautions  []  [] D/C Instruction Review  [] Knee Prosthesis Review  [x] Walker Management/Safety [] Adaptive Equipment as Needed       Interdisciplinary Collaboration:   Registered Nurse    After treatment position/precautions:   Up in chair  Bed alarm/tab alert on  Bed/Chair-wheels locked  Call light within reach  RN notified    Compliance with Program/Exercises: Will assess as treatment progresses. Recommendations/Intent for next treatment session:  Treatment next visit will focus on increasing Mr. Yessenia Isidro independence with bed mobility, transfers, gait training, strength/ROM exercises, modalities for pain, and patient education.       Total Treatment Duration:  PT Patient Time In/Time Out  Time In: 1721  Time Out: [address], PT

## 2020-07-13 NOTE — OP NOTES
[facility]  Total Knee Arthroplasty  Patient:Asa Patel   : 1934  Medical Record [id number]    Pre-operative Diagnosis: Osteoarthritis of right knee, unspecified osteoarthritis type [M17.11]  Acquired deformity of knee, right [M21.961]        Post-operative Diagnosis: Osteoarthritis of right knee, unspecified osteoarthritis type [M17.11]  Acquired deformity of knee, right [M21.961]    Location: [place] [address]    Date of Procedure: 2020    Surgeon: Xavi Sierra MD    Assistant: Elsy Noel CFA    Anesthesia: Spinal + adductor nerve block    Procedure:  FRANK-Assisted Right Total Knee Arthroplasty    Tourniquet Time: Tourniquet Not Used    BMI: Body mass index is 30.74 kg/m². EBL: [id number]     Complications: None    Patient Condition upon Completion of Procedure: Stable    Implants:   Implant Name Type Inv. Item Serial No.  Lot No. LRB No. Used Action   INSERT TIB ARTC BEAR SZ6 9MM - [id number]  INSERT TIB ARTC BEAR SZ6 9MM [id number] [employer] [facility] [id number] Right 1 Implanted   IMPL KNEE TIB COMPONENT SZ 6 - [id number]  IMPL KNEE TIB COMPONENT SZ 6 [id number] [employer] [facility] [id number] Right 1 Implanted   IMPL KNEE FEMORAL CRUCIATE SZ6 RT - [id number]  IMPL KNEE FEMORAL CRUCIATE SZ6 RT [id number] [employer] [facility] [id number] Right 1 Implanted   PAT ASYM MTL-BK 11MM SZ A38 - [id number]  PAT ASYM MTL-BK 11MM SZ A38 [id number] [employer] [facility] [id number] Right 1 Implanted       Pre-Operative Plan/Implants:   - #6 Femoral Component   - #6 Tibial Component   - 9mm Polyethylene Component    Intra-Operative Findings: Prior to bony resection we found that the patient's knee lacked approximately 5 degrees of extension. Also prior to bony resection we noted a varus coronal deformity. Intra-operatively we noted that the articular surfaces were arthritic with cartilage loss of both the medial and lateral compartments.  The patella was examined and found to be in poor condition and was resurfaced. With trial components in place we assessed knee motion and found that the knee was able to fully extend. Flexion was felt to be 140 degrees. In addition we felt we had achieved acceptable ligament balance between the medial and lateral side of the knee in both extension and flexion. Description of Procedure: The complexity of the total joint surgery requires the use of a first assistant for positioning, retraction and assistance in closure. Daisha Dee had undergone adductor canal block in the preoperative holding area. Daisha Dee was brought to the operating room, positioned on the operating room table, and after appropriate identification underwent Spinal anesthesia. IV antibiotics were administered per proticol. The right leg and was then prepped and draped in the usual sterile manner. Timeout was taken identifying the patient, procedure ,operative side and surgeon. Prior to incision one gram of IV transexamic acid was administered intravenously. An anterior longitudinal incision was made just medial to the tibial tubercle and extending proximal.  A subvastas capsular incision was performed. The medial capsular flap was elevated around to the midcoronal plane. The patella was subluxed laterally and patellar fat pat partially excised. The knee was flexed and externally rotated. The articular surface revealed cartilage loss with exposed bone and bone spurs throughout all three compartments. The anterior cruciate ligament was resected. We then placed both our femoral and tibial check points followed by the femoral and tibial array pins. The femoral arrays pins were placed intra-incisional whereas the tibial pins were placed extra-incisional approximately 4-5cm below the tibial tubercle. Both arrays were placed and were verified to be visible to the FRANK sensory array. We then proceeded with point acquisition of both the femur and tibia.  Finally, we captured stress views of the knee in extension and 90 degrees of flexion for our ligament balancing after medial and/or lateral osteophytes were removed. We then adjusted our planned femoral and tibial component placement parameters to obtain acceptable ligament balance while ensuring that we stayed within acceptable alignment criteria as well as resection depths/parameters for both the femur and tibia. Based on our preoperative plan we planned on using a #6 femoral component and a #6tibial component with a 9mm poly component. Ultimately we decided on a #6 femoral component, a #6tibial component and a 9mm polyethylene component. Retractors were placed and we proceed with our bony preparation. We first addressed our distal femur and posterior chamfer cuts using the 90 degrees blade. We then performed our posterior condylar, anterior femoral, anterior chamfer, and proximal tibial cuts with the straight FRANK blade. Bony wedges were removed after these cuts as were any residual osteophytes. The PCL was assessed and felt to be in good condition and be very stable. Medial and lateral meniscus' were removed along with any redundant tissue. Any posterior osteophytes were removed. The posteromedial and posterolateral capsule as well as the medial and lateral periosteum of the distal femur and proximal tibia were injected with periarticular cocktail containing local anesthetic and toradol. Trial components were then placed. We then performed a trial of the knee with trial components in place. We felt that with a 9 mm polyethylene trial in place the knee had acceptable varus and valgus stability throughout arc of motion, was able to fully extend, was not too tight in flexion, and had acceptable stability with anterior  Drawer testing. No additional surgical releases were required. Again we assessed our PCL and felt it was stable and in good condition. The patella was then everted and examined.  The patella was felt to be in poor condition and the decision was made to  resurface the patella. Bone was resected to accomodate a size 38mm patella button. A trial reduction revealed appropriate tracking through the patellofemoral groove with no lateral retinacular release required. Again patellar tracking was assessed and it was felt that the patella was tracking appropriately within the femoral trochlear groove. At this point the patella was irrigated of any debride and the final patellar component was inserted which was a tritanium-backed cementless component. At this point the trial polyethylene component and trial femoral component were removed. We again assessed our tibial tray and verified that we were satisfied with its position. We did not have to adjust its position. The proximal tibia was punched and drilled per protocol for the system. We irrigated and debrided all bony surfaces of residual tissue and bone. We then inserted the tibial and femoral components and noted them to be well seated. We then inserted our final polyethylene component which was a 9mm thick. Galindo Patel's knee was placed through a range of motion and noted to be stable as mentioned above with the trial components. In additional we checked patellar tracking one last time which was felt to be appropriate. A lavage of diluted betadine solution of  17.5 ml Betadine in 500 of 0.9% normal saline was allowed to soak in the wound for 3 minutes after implanting of the prosthesis. During the time of the Betadine soak we removed the previously placed femoral and tibial sensors and array pins and finished injection our periarticular cocktail. The wound was irrigated with normal saline again before closure. The capsular layer was closed using a combination of 0-Stratafix bidirectional barbed suture and #2 Fiberwire. After capsular closure one gram of topical  transexemic acid was injected into the capsule.  The subcutaneous layer was closed using a 2-0 Monocril interrupted suture. The skin was closed using staples. A sterile dressing was applied. The sponge count and needle counts were correct. Patient was transferred to the hospital stretcher and transported to recovery in stable condition.     Signed By: David Roy MD
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
## 2020-07-13 NOTE — ANESTHESIA PROCEDURE NOTES
Peripheral Block    Start time: 7/13/2020 12:41 PM  End time: 7/13/2020 12:43 PM  Performed by: Shantel Driver MD  Authorized by: Shantel Driver MD       Pre-procedure: Indications: at surgeon's request and post-op pain management    Preanesthetic Checklist: patient identified, risks and benefits discussed, site marked, timeout performed, anesthesia consent given and patient being monitored    Timeout Time: 12:41          Block Type:   Block Type:   Adductor canal  Laterality:  Right  Monitoring:  Standard ASA monitoring, responsive to questions, continuous pulse ox, oxygen, frequent vital sign checks and heart rate  Injection Technique:  Single shot  Procedures: ultrasound guided    Patient Position: supine  Prep: chlorhexidine    Location:  Mid thigh  Needle Type:  Stimuplex  Needle Gauge:  22 G  Needle Localization:  Ultrasound guidance    Assessment:  Number of attempts:  1  Injection Assessment:  Incremental injection every 5 mL, negative aspiration for CSF, ultrasound image on chart, no paresthesia, local visualized surrounding nerve on ultrasound, negative aspiration for blood and no intravascular symptoms  Patient tolerance:  Patient tolerated the procedure well with no immediate complications

## 2020-07-13 NOTE — PERIOP NOTES
TRANSFER - OUT REPORT:    Verbal report given to receiving nurse(name) on Arlin Plaza being transferred to [facility](unit) for routine progression of care       Report consisted of patient's Situation, Background, Assessment and   Recommendations(SBAR). Information from the following report(s) OR Summary, Intake/Output and MAR was reviewed with the receiving nurse. Opportunity for questions and clarification was provided.       Patient transported with:   O2 @ 2 liters  Tech

## 2020-07-14 VITALS
OXYGEN SATURATION: 97 % | DIASTOLIC BLOOD PRESSURE: 64 MMHG | HEART RATE: 91 BPM | BODY MASS INDEX: 30.88 KG/M2 | SYSTOLIC BLOOD PRESSURE: 106 MMHG | WEIGHT: 233 LBS | HEIGHT: 73 IN | RESPIRATION RATE: 16 BRPM | TEMPERATURE: 97.5 F

## 2020-07-14 LAB — HGB BLD-MCNC: 11.6 G/DL (ref 13.6–17.2)

## 2020-07-14 PROCEDURE — 36415 COLL VENOUS BLD VENIPUNCTURE: CPT

## 2020-07-14 PROCEDURE — 74011250637 HC RX REV CODE- 250/637: Performed by: NURSE PRACTITIONER

## 2020-07-14 PROCEDURE — 97110 THERAPEUTIC EXERCISES: CPT

## 2020-07-14 PROCEDURE — 97116 GAIT TRAINING THERAPY: CPT

## 2020-07-14 PROCEDURE — 85018 HEMOGLOBIN: CPT

## 2020-07-14 PROCEDURE — 74011250636 HC RX REV CODE- 250/636: Performed by: NURSE PRACTITIONER

## 2020-07-14 PROCEDURE — 97535 SELF CARE MNGMENT TRAINING: CPT

## 2020-07-14 PROCEDURE — 74011000250 HC RX REV CODE- 250: Performed by: NURSE PRACTITIONER

## 2020-07-14 RX ADMIN — OXYCODONE 5 MG: 5 TABLET ORAL at 02:13

## 2020-07-14 RX ADMIN — PANTOPRAZOLE SODIUM 40 MG: 40 TABLET, DELAYED RELEASE ORAL at 06:50

## 2020-07-14 RX ADMIN — LORAZEPAM 0.5 MG: 0.5 TABLET ORAL at 02:13

## 2020-07-14 RX ADMIN — OXYCODONE 5 MG: 5 TABLET ORAL at 12:03

## 2020-07-14 RX ADMIN — MULTIPLE VITAMINS W/ MINERALS TAB 1 TABLET: TAB at 08:25

## 2020-07-14 RX ADMIN — POLYETHYLENE GLYCOL 3350 17 G: 17 POWDER, FOR SOLUTION ORAL at 09:00

## 2020-07-14 RX ADMIN — ACETAMINOPHEN 1000 MG: 500 TABLET, FILM COATED ORAL at 02:16

## 2020-07-14 RX ADMIN — Medication 2 G: at 06:50

## 2020-07-14 RX ADMIN — OXYCODONE 5 MG: 5 TABLET ORAL at 08:25

## 2020-07-14 RX ADMIN — ASPIRIN 81 MG: 81 TABLET, COATED ORAL at 08:25

## 2020-07-14 RX ADMIN — CITALOPRAM HYDROBROMIDE 20 MG: 20 TABLET ORAL at 08:25

## 2020-07-14 RX ADMIN — DOCUSATE SODIUM 50MG AND SENNOSIDES 8.6MG 2 TABLET: 8.6; 5 TABLET, FILM COATED ORAL at 08:25

## 2020-07-14 RX ADMIN — ACETAMINOPHEN 1000 MG: 500 TABLET, FILM COATED ORAL at 12:03

## 2020-07-14 RX ADMIN — LEVETIRACETAM 500 MG: 500 TABLET ORAL at 08:25

## 2020-07-14 RX ADMIN — Medication 1 AMPULE: at 08:24

## 2020-07-14 NOTE — DISCHARGE INSTRUCTIONS
[id number] [facility]   Patient Discharge Instructions    Jillian Cunningham / [id number] : 1934    Admitted 2020 Discharged: 2020     IF YOU HAVE ANY PROBLEMS ONCE YOU ARE AT HOME CALL THE FOLLOWING NUMBERS:   Main office number: [phone]    Take Home Medications     · It is important that you take the medication exactly as they are prescribed. · Keep your medication in the bottles provided by the pharmacist and keep a list of the medication names, dosages, and times to be taken in your wallet. · Do not take other medications without consulting your doctor. What to do at [address] your prehospital diet. If you have excessive nausea or vomitting call your doctor's office     Home Physical Therapy is arranged. Use rolling walker when walking. Patients who have had a joint replacement should not drive until you are seen for your follow up appointment by Dr. Oxana Peterson. When to Call    - Call if you have a temperature greater then 101  - Unable to keep food down  - Loose control of your bladder or bowel function  - Are unable to bear any weight   - Need a pain medication refill       DISCHARGE SUMMARY from Nurse    The following personal items collected during your admission are returned to you:   Dental Appliance: Dental Appliances: None  Vision: Visual Aid: None  Hearing Aid:    Jewelry: Jewelry: Ring(3 rings taped)  Clothing: Clothing: None  Other Valuables: Other Valuables: None  Valuables sent to safe: Personal Items Sent to Safe: none    PATIENT INSTRUCTIONS:    After general anesthesia or intravenous sedation, for 24 hours or while taking prescription Narcotics:  · Limit your activities  · Do not drive and operate hazardous machinery  · Do not make important personal or business decisions  · Do  not drink alcoholic beverages  · If you have not urinated within 8 hours after discharge, please contact your surgeon on call.     Report the following to your surgeon:  · Excessive pain, swelling, redness or odor of or around the surgical area  · Temperature over 101  · Nausea and vomiting lasting longer than 4 hours or if unable to take medications  · Any signs of decreased circulation or nerve impairment to extremity: change in color, persistent  numbness, tingling, coldness or increase pain  · Any questions, call office @ [phone]      Keep scheduled follow up appointment. If need to change, call office @ [phone]. *  Please give a list of your current medications to your Primary Care Provider. *  Please update this list whenever your medications are discontinued, doses are      changed, or new medications (including over-the-counter products) are added. *  Please carry medication information at all times in case of emergency situations. Patient Education        Total Knee Replacement: What to Expect at [address] [place] [address] Drive had a total knee replacement. The doctor replaced the worn ends of the bones that connect to your knee (thighbone and lower leg bone) with plastic and metal parts. When you leave the hospital, you should be able to move around with a walker or crutches. But you will need someone to help you at home for the next few weeks or until you have more energy and can move around better. If you need more extensive rehab, you may go to a specialized rehab center for more treatment. You will go home with a bandage and stitches, staples, tissue glue, or tape strips. Change the bandage as your doctor tells you to. If you have stitches or staples, your doctor will remove them 10 to 21 days after your surgery. Glue or tape strips will fall off on their own over time. You may still have some mild pain, and the area may be swollen for 3 to 6 months after surgery. Your knee will continue to improve for 6 to 12 months. You will probably use a walker for 1 to 3 weeks and then use crutches. When you are ready, you can use a cane.  You will probably be able to walk on your own in 4 to 8 weeks. You will need to do months of physical rehabilitation (rehab) after a knee replacement. Rehab will help you strengthen the muscles of the knee and help you regain movement. After you recover, your artificial knee will allow you to do normal daily activities with less pain or no pain at all. You may be able to hike, dance, ride a bike, and play golf. Talk to your doctor about whether you can do more strenuous activities. Always tell your caregivers that you have an artificial knee. How long it will take to walk on your own, return to normal activities, and go back to work depends on your health and how well your rehabilitation (rehab) program goes. The better you do with your rehab exercises, the quicker you will get your strength and movement back. This care sheet gives you a general idea about how long it will take for you to recover. But each person recovers at a different pace. Follow the steps below to get better as quickly as possible. How can you care for yourself at home? Activity  · Rest when you feel tired. You may take a nap, but don't stay in bed all day. When you sit, use a chair with arms. You can use the arms to help you stand up. · Work with your physical therapist to find the best way to exercise. What you can do as your knee heals will depend on whether your new knee is cemented or uncemented. You may not be able to do certain things for a while if your new knee is uncemented. · After your knee has healed enough, you can do more strenuous activities with caution. ? You can golf, but use a golf cart. And don't wear shoes with spikes. ? You can bike on a flat road or on a stationary bike. Avoid biking up hills. ? Your doctor may suggest that you stay away from activities that put stress on your knee. These include tennis, badminton, squash, racquetball, contact sports like football, jumping (such as in basketball), jogging, and running. ?  Avoid activities where you might fall. These include horseback riding, skiing, and mountain biking. · Do not sit for more than 1 hour at a time. Get up and walk around for a while before you sit again. If you must sit for a long time, prop up your leg with a chair or footstool. This will help you avoid swelling. · Ask your doctor when you can drive again. It may take up to 8 weeks after knee replacement surgery before it's safe for you to drive. · When you get into a car, sit on the edge of the seat. Then pull in your legs, and turn to face the front. · You should be able to do many everyday activities 3 to 6 weeks after your surgery. You will probably need to take 4 to 16 weeks off from work. When you can go back to work depends on the type of work you do and how you feel. · Ask your doctor when it is okay for you to have sex. · For 12 weeks, do not lift anything heavier than 10 pounds and do not lift weights. Diet  · By the time you leave the hospital, you should be eating your normal diet. If your stomach is upset, try bland, low-fat foods like plain rice, broiled chicken, toast, and yogurt. Your doctor may suggest that you take iron and vitamin supplements. · Drink plenty of fluids (unless your doctor tells you not to). · Eat healthy foods, and watch your portion sizes. Try to stay at your ideal weight. Too much weight puts more stress on your new knee. · You may notice that your bowel movements are not regular right after your surgery. This is common. Try to avoid constipation and straining with bowel movements. You may want to take a fiber supplement every day. If you have not had a bowel movement after a couple of days, ask your doctor about taking a mild laxative. Medicines  · Your doctor will tell you if and when you can restart your medicines. He or she will also give you instructions about taking any new medicines. · If you take aspirin or some other blood thinner, ask your doctor if and when to start taking it again. Make sure that you understand exactly what your doctor wants you to do. · Your doctor may give you a blood-thinning medicine to prevent blood clots. If you take a blood thinner, be sure you get instructions about how to take your medicine safely. Blood thinners can cause serious bleeding problems. This medicine could be in pill form or as a shot (injection). If a shot is needed, your doctor will tell you how to do this. · Be safe with medicines. Take pain medicines exactly as directed. ? If the doctor gave you a prescription medicine for pain, take it as prescribed. ? If you are not taking a prescription pain medicine, ask your doctor if you can take an over-the-counter medicine. ? Plan to take your pain medicine 30 minutes before exercises. It is easier to prevent pain before it starts than to stop it after it has started. · If you think your pain medicine is making you sick to your stomach:  ? Take your medicine after meals (unless your doctor has told you not to). ? Ask your doctor for a different pain medicine. · If your doctor prescribed antibiotics, take them as directed. Do not stop taking them just because you feel better. You need to take the full course of antibiotics. Incision care  · If your doctor told you how to care for your cut (incision), follow your doctor's instructions. You will have a dressing over the cut. A dressing helps the incision heal and protects it. Your doctor will tell you how to take care of this. · If you did not get instructions, follow this general advice:  ? If you have strips of tape on the cut the doctor made, leave the tape on for a week or until it falls off.  ? If you have stitches or staples, your doctor will tell you when to come back to have them removed. ? If you have skin adhesive on the cut, leave it on until it falls off. Skin adhesive is also called glue or liquid stitches. ? Change the bandage every day. ?  Wash the area daily with warm water, and pat it dry. Don't use hydrogen peroxide or alcohol. They can slow healing. ? You may cover the area with a gauze bandage if it oozes fluid or rubs against clothing. ? You may shower 24 to 48 hours after surgery. Pat the incision dry. Don't swim or take a bath for the first 2 weeks, or until your doctor tells you it is okay. Exercise  · Your rehab program will give you a number of exercises to do to help you get back your knee's range of motion and strength. Always do them as your therapist tells you. Ice  · For pain and swelling, put ice or a cold pack on the area for 10 to 20 minutes at a time. Put a thin cloth between the ice and your skin. Other instructions  · Keep wearing your support stockings as your doctor says. These help to prevent blood clots. How long you'll have to wear them depends on your activity level and the amount of swelling. · Carry a medical alert card that says you have an artificial joint. You have metal pieces in your knee. These may set off some airport metal detectors. Follow-up care is a key part of your treatment and safety. Be sure to make and go to all appointments, and call your doctor if you are having problems. It's also a good idea to know your test results and keep a list of the medicines you take. When should you call for help? [id number] anytime you think you may need emergency care. For example, call if:  · You passed out (lost consciousness). · You have severe trouble breathing. · You have sudden chest pain and shortness of breath, or you cough up blood. Call your doctor now or seek immediate medical care if:  · You have signs of infection, such as:  ? Increased pain, swelling, warmth, or redness. ? Red streaks leading from the incision. ? Pus draining from the incision. ? A fever. · You have signs of a blood clot, such as:  ? Pain in your calf, back of the knee, thigh, or groin. ? Redness and swelling in your leg or groin.   · Your incision comes open and begins to bleed, or the bleeding increases. · You have pain that does not get better after you take pain medicine. Watch closely for changes in your health, and be sure to contact your doctor if:  · You do not have a bowel movement after taking a laxative. Where can you learn more? Go to http://www.gray.com/  Enter T054 in the search box to learn more about \"Total Knee Replacement: What to Expect at Home. \"  Current as of: March 2, 2020               Content Version: 12.5  © 2006-2020 [employer]. Care instructions adapted under license by [employer] (which disclaims liability or warranty for this information). If you have questions about a medical condition or this instruction, always ask your healthcare professional. [address] any warranty or liability for your use of this information. These are general instructions for a healthy lifestyle:    No smoking/ No tobacco products/ Avoid exposure to second hand smoke    Surgeon General's Warning:  Quitting smoking now greatly reduces serious risk to your health. Obesity, smoking, and sedentary lifestyle greatly increases your risk for illness    A healthy diet, regular physical exercise & weight monitoring are important for maintaining a healthy lifestyle    You may be retaining fluid if you have a history of heart failure or if you experience any of the following symptoms:  Weight gain of 3 pounds or more overnight or 5 pounds in a week, increased swelling in our hands or feet or shortness of breath while lying flat in bed. Please call your doctor as soon as you notice any of these symptoms; do not wait until your next office visit.     Recognize signs and symptoms of STROKE:    F-face looks uneven    A-arms unable to move or move even    S-speech slurred or non-existent    T-time-call 911 as soon as signs and symptoms begin-DO NOT go       Back to bed or wait to see if you get better-TIME IS BRAIN. The discharge information has been reviewed with the patient. The patient verbalized understanding. Information obtained by :  I understand that if any problems occur once I am at home I am to contact my physician. I understand and acknowledge receipt of the instructions indicated above.                                                                                                                                            Physician's or R.N.'s Signature                                                                  Date/Time                                                                                                                                              Patient or Representative Signature                                                          Date/Time

## 2020-07-14 NOTE — PROGRESS NOTES
Problem: Mobility Impaired (Adult and Pediatric)  Goal: *Acute Goals and Plan of Care (Insert Text)  Description: GOALS (1-4 days):  (1.)Mr. Segundo Stern will move from supine to sit and sit to supine  in bed with SUPERVISION. (2.)Mr. Segundo Stern will transfer from bed to chair and chair to bed with STAND BY ASSIST using the least restrictive device. (3.)Mr. Segundo Stern will ambulate with STAND BY ASSIST for 200 feet with the least restrictive device. (4.)Mr. Segundo Stern will ambulate up/down 3 steps with bilateral  railing with CONTACT GUARD ASSIST with no device. (for practice, pt has no stairs)  (5.)Mr. Segundo Stern will increase right knee ROM to 5°-80°.  ________________________________________________________________________________________________   Outcome: Progressing Towards Goal     PHYSICAL THERAPY JOINT CAMP TKA: Daily Note and AM 7/14/2020  INPATIENT: Hospital Day: 2  Payor: [facility] [place] MEDICARE / Plan: [place] / Product Type: Managed Care Medicare /      NAME/AGE/GENDER: Ade Chavira is a 80 y.o. male   PRIMARY DIAGNOSIS:  Osteoarthritis of right knee, unspecified osteoarthritis type [M17.11]  Acquired deformity of knee, right [M21.961]   Procedure(s) and Anesthesia Type:     * RIGHT KNEE ARTHROPLASTY TOTAL ROBOTIC ASSISTED - Spinal (Right)  ICD-10: Treatment Diagnosis:    · Pain in Right Knee (M25.561)  · Stiffness of Right Knee, Not elsewhere classified (M25.661)  · Difficulty in walking, Not elsewhere classified (R26.2)      ASSESSMENT:     Mr. Segundo Stern presents with impaired strength & mobility s/p right TKA. This pt will benefit from follow up therapy to help restore safe function prior to returning home with caregiver. Pt. Ariella Haile to get up and wants to go home later today. He said he would have someone with him at home. He did some tka exercises with cues and assistance, needed reminders to keep his knee resting straight as he tends to keep it flexed.   He ambulated in the room with RW, needed close CGA and reminders to keep the walker close to him. We discussed having someone with him at all times for safety. He had no questions. This section established at most recent assessment   PROBLEM LIST (Impairments causing functional limitations):  1. Decreased Strength  2. Decreased ADL/Functional Activities  3. Decreased Transfer Abilities  4. Decreased Ambulation Ability/Technique  5. Decreased Balance  6. Increased Pain  7. Decreased Flexibility/Joint Mobility  8. Decreased [place] with Home Exercise Program   INTERVENTIONS PLANNED: (Benefits and precautions of physical therapy have been discussed with the patient.)  1. bed mobility  2. gait training  3. home exercise program (HEP)  4. Range of Motion: active/assisted/passive  5. Therapeutic Activities  6. therapeutic exercise/strengthening  7. transfer training  8. Group Therapy     TREATMENT PLAN: Frequency/Duration: Follow patient BID for duration of hospital stay to address above goals. Rehabilitation Potential For Stated Goals: Good     RECOMMENDED REHABILITATION/EQUIPMENT: (at time of discharge pending progress): Continue Skilled Therapy and Home Health: Physical Therapy. HISTORY:   History of Present Injury/Illness (Reason for Referral):  Right TKA  Past Medical History/Comorbidities:   Mr. Narendra Ramírez  has a past medical history of Acute lumbar radiculopathy, Arthritis, Ascending aorta dilatation ([address] Atrial fibrillation with RVR ([address] Enlarged prostate, Martinez catheter in place, GERD (gastroesophageal reflux disease), High cholesterol, Hypertension, ICH (intracerebral hemorrhage) ([address] Lumbar stenosis with neurogenic claudication, Other forms of scoliosis, lumbar region, Poor historian, Psychiatric disorder, Seizures ([address] Tongue lesion, and Urinary frequency.   Mr. Narendra Ramírez  has a past surgical history that includes hx other surgical; hx turp (10/20/11); hx heent (8/2012); hx orthopaedic (Right); and hx other surgical.  Social History/Living Environment:   Home Environment: Private residence  # Steps to Enter: 0  Wheelchair Ramp: Yes  One/Two Story Residence: One story  Living Alone: Yes  Support Systems: Family member(s)  Patient Expects to be Discharged to[de-identified] Private residence  Current DME Used/Available at Home: Walker, rollator  Prior Level of Function/Work/Activity:  Pt was functioning in the home using a Rolator with falls reported prior to this admission. Number of Personal Factors/Comorbidities that affect the Plan of Care: 3+: HIGH COMPLEXITY   EXAMINATION:   Most Recent Physical Functioning:                            Bed Mobility  Supine to Sit: Contact guard assistance    Transfers  Sit to Stand: Contact guard assistance;Minimum assistance  Stand to Sit: Contact guard assistance;Minimum assistance  Bed to Chair: Contact guard assistance;Minimum assistance    Balance  Sitting: Intact  Standing: Pull to stand; With support              Weight Bearing Status  Right Side Weight Bearing: As tolerated  Distance (ft): 20 Feet (ft)  Ambulation - Level of Assistance: Contact guard assistance;Minimal assistance  Assistive Device: Walker, rolling  Speed/Elena: Delayed  Step Length: Left shortened;Right shortened  Stance: Right decreased  Gait Abnormalities: Antalgic;Decreased step clearance  Interventions: Safety awareness training;Verbal cues     Braces/Orthotics: none    Right Knee Cold  Type: Cryocuff      Body Structures Involved:  1. Joints  2. Muscles Body Functions Affected:  1. Sensory/Pain  2. Movement Related Activities and Participation Affected:  1. General Tasks and Demands  2. Mobility   Number of elements that affect the Plan of Care: 4+: HIGH COMPLEXITY   CLINICAL PRESENTATION:   Presentation: Stable and uncomplicated: LOW COMPLEXITY   CLINICAL DECISION MAKIN [facility] [address] [id number] AM-PAC 6 Clicks   Basic Mobility Inpatient Short Form  How much difficulty does the patient currently have. ..  Unable A Lot A Little None 1.  Turning over in bed (including adjusting bedclothes, sheets and blankets)? [] 1   [] 2   [x] 3   [] 4   2. Sitting down on and standing up from a chair with arms ( e.g., wheelchair, bedside commode, etc.)   [] 1   [] 2   [x] 3   [] 4   3. Moving from lying on back to sitting on the side of the bed? [] 1   [] 2   [x] 3   [] 4   How much help from another person does the patient currently need. .. Total A Lot A Little None   4. Moving to and from a bed to a chair (including a wheelchair)? [] 1   [] 2   [x] 3   [] 4   5. Need to walk in hospital room? [] 1   [] 2   [x] 3   [] 4   6. Climbing 3-5 steps with a railing? [x] 1   [] 2   [] 3   [] 4   © 2007, Trustees of [address] [place] [address], under license to [employer]. All rights reserved     Score:  Initial: 16 Most Recent: X (Date: -- )    Interpretation of Tool:  Represents activities that are increasingly more difficult (i.e. Bed mobility, Transfers, Gait). Medical Necessity:     · Patient is expected to demonstrate progress in   · strength, range of motion, balance, coordination, and functional technique  ·  to   · decrease assistance required with bed mobility, transfers & gait  · .  Reason for Services/Other Comments:  · Patient continues to require skilled intervention due to   · Pt not safe with functional mobility  · . Use of outcome tool(s) and clinical judgement create a POC that gives a: Clear prediction of patient's progress: LOW COMPLEXITY            TREATMENT:   (In addition to Assessment/Re-Assessment sessions the following treatments were rendered)     Pre-treatment Symptoms/Complaints:  Knee pain  Pain Initial: numeric scale     Post Session:  7     Therapeutic Exercise: (15 Minutes):  Exercises per grid below to improve mobility and dynamic movement of leg - right to improve functional endurance . Required minimal verbal cues to promote proper body alignment and promote proper body mechanics.   Progressed range and repetitions as indicated. Gait Training (10 Minutes):  Gait training to improve and/or restore physical functioning as related to mobility, strength and balance. Ambulated 20 Feet (ft) with Contact guard assistance;Minimal assistance using a Walker, rolling and minimal Safety awareness training;Verbal cues related to their stance phase to promote proper body alignment, promote proper body posture and promote proper body mechanics. Date:  7/13 Date:  7/14 Date:     ACTIVITY/EXERCISE AM PM AM PM AM PM   GROUP THERAPY  []  []  []  []  []  []   Ankle Pumps  10 10a      Quad Sets  10 10a      Gluteal Sets  10 10a      Hip ABd/ADduction  10 10a      Straight Leg Raises  10 10aa      Knee Slides  10 10aa      Short Arc Quads  10       Long Arc Quads         Chair Slides                  B = bilateral; AA = active assistive; A = active; P = passive      Treatment/Session Assessment:     Response to Treatment:  Did fine, needs close CGA    Education:  [x] Home Exercises  [x] Fall Precautions  []  [] D/C Instruction Review  [x] Knee Prosthesis Review  [x] Walker Management/Safety [] Adaptive Equipment as Needed       Interdisciplinary Collaboration:   o Occupational Therapist  o Registered Nurse    After treatment position/precautions:   o Up in chair  o Bed/Chair-wheels locked  o Bed in low position  o Call light within reach  o Nurse at bedside  o left with OT    Compliance with Program/Exercises: Will assess as treatment progresses. Recommendations/Intent for next treatment session:  Treatment next visit will focus on increasing Mr. Yessenia Isidro independence with bed mobility, transfers, gait training, strength/ROM exercises, modalities for pain, and patient education.       Total Treatment Duration:  PT Patient Time In/Time Out  Time In: 0800  Time Out: Leeann Martines, PT

## 2020-07-14 NOTE — PROGRESS NOTES
Problem: Falls - Risk of  Goal: *Absence of Falls  Description: Document Loki Mcgregor Fall Risk and appropriate interventions in the flowsheet.   Outcome: Progressing Towards Goal  Note: Fall Risk Interventions:  Mobility Interventions: Bed/chair exit alarm         Medication Interventions: Bed/chair exit alarm    Elimination Interventions: Call light in reach    History of Falls Interventions: Bed/chair exit alarm

## 2020-07-14 NOTE — PROGRESS NOTES
Follow up from initial assessment    SOB evaluated: None noted    Breath Sounds: Clear      Patient resting comfortably, denies any discomfort at this time. Patient placed on continuous sat monitor  HS per protocol. Monitor history deleted prior to placing on patient.

## 2020-07-14 NOTE — PROGRESS NOTES
Problem: Mobility Impaired (Adult and Pediatric)  Goal: *Acute Goals and Plan of Care (Insert Text)  Description: GOALS (1-4 days):  (1.)Mr. Munoz Client will move from supine to sit and sit to supine  in bed with SUPERVISION. (2.)Mr. Munoz Client will transfer from bed to chair and chair to bed with STAND BY ASSIST using the least restrictive device. (3.)Mr. Tammy Draper will ambulate with STAND BY ASSIST for 200 feet with the least restrictive device. (4.)Mr. Tammy Draper will ambulate up/down 3 steps with bilateral  railing with CONTACT GUARD ASSIST with no device. (for practice, pt has no stairs)  (5.)Mr. Munoz Client will increase right knee ROM to 5°-80°.  ________________________________________________________________________________________________   Outcome: Progressing Towards Goal     PHYSICAL THERAPY JOINT CAMP TKA: Daily Note and PM 7/14/2020  INPATIENT: Hospital Day: 2  Payor: [facility] / [place] / Product Type: Managed Care Medicare /      NAME/AGE/GENDER: Alex Lee is a 80 y.o. male   PRIMARY DIAGNOSIS:  Osteoarthritis of right knee, unspecified osteoarthritis type [M17.11]  Acquired deformity of knee, right [M21.961]   Procedure(s) and Anesthesia Type:     * RIGHT KNEE ARTHROPLASTY TOTAL ROBOTIC ASSISTED - Spinal (Right)  ICD-10: Treatment Diagnosis:    · Pain in Right Knee (M25.561)  · Stiffness of Right Knee, Not elsewhere classified (M25.661)  · Difficulty in walking, Not elsewhere classified (R26.2)      ASSESSMENT:     Mr. Tammy Draper presents with impaired strength & mobility s/p right TKA. This pt will benefit from follow up therapy to help restore safe function prior to returning home with caregiver. Pt. In recliner on contact, very eager to go home and does not want to stay any longer. He tells me he will have someone with him but not all the time.   We discussed this in detail and I stressed that he should have someone with him at all times - he did not agree and said he would be fine. He reports he has a Marcos Horde' that will help him and she is a quick phone call away. I told his friend who came to take him home that I recommended he has someone with him at all times. We also discussed that he will have much more pain and swelling at home as the nerve block wears off. We discussed importance of safety and not falling and rom and swelling control. He did ambulate further with RW this pm, has no stairs but he needed some help to stand. He did tka exercises with cues and some guidance. He had no questions or concerns about going home today. This section established at most recent assessment   PROBLEM LIST (Impairments causing functional limitations):  1. Decreased Strength  2. Decreased ADL/Functional Activities  3. Decreased Transfer Abilities  4. Decreased Ambulation Ability/Technique  5. Decreased Balance  6. Increased Pain  7. Decreased Flexibility/Joint Mobility  8. Decreased [place] with Home Exercise Program   INTERVENTIONS PLANNED: (Benefits and precautions of physical therapy have been discussed with the patient.)  1. bed mobility  2. gait training  3. home exercise program (HEP)  4. Range of Motion: active/assisted/passive  5. Therapeutic Activities  6. therapeutic exercise/strengthening  7. transfer training  8. Group Therapy     TREATMENT PLAN: Frequency/Duration: Follow patient BID for duration of hospital stay to address above goals. Rehabilitation Potential For Stated Goals: Good     RECOMMENDED REHABILITATION/EQUIPMENT: (at time of discharge pending progress): Continue Skilled Therapy and Home Health: Physical Therapy.               HISTORY:   History of Present Injury/Illness (Reason for Referral):  Right TKA  Past Medical History/Comorbidities:   Mr. Feliz Smoker  has a past medical history of Acute lumbar radiculopathy, Arthritis, Ascending aorta dilatation ([address] Atrial fibrillation with RVR ([address] Enlarged prostate, Martinez catheter in place, GERD (gastroesophageal reflux disease), High cholesterol, Hypertension, I[address] (intracerebral hemorrhage) ([facility] Lumbar stenosis with neurogenic claudication, Other forms of scoliosis, lumbar region, Poor historian, Psychiatric disorder, Seizures ([facility] Tongue lesion, and Urinary frequency. Mr. Segundo Stern  has a past surgical history that includes hx other surgical; hx turp (10/20/11); hx heent (8/2012); hx orthopaedic (Right); and hx other surgical.  Social History/Living Environment:   Home Environment: Private residence  # Steps to Enter: 0  Wheelchair Ramp: Yes  One/Two Story Residence: One story  Living Alone: Yes  Support Systems: Family member(s)  Patient Expects to be Discharged to[de-identified] Private residence  Current DME Used/Available at Home: Walker, rollator  Prior Level of Function/Work/Activity:  Pt was functioning in the home using a Rolator with falls reported prior to this admission. Number of Personal Factors/Comorbidities that affect the Plan of Care: 3+: HIGH COMPLEXITY   EXAMINATION:   Most Recent Physical Functioning:                RLE PROM  R Knee Flexion: 98  R Knee Extension: 8           Bed Mobility  Supine to Sit: Contact guard assistance    Transfers  Sit to Stand: Minimum assistance  Stand to Sit: Contact guard assistance  Bed to Chair: Contact guard assistance;Minimum assistance    Balance  Sitting: Intact  Standing: With support;Pull to stand              Weight Bearing Status  Right Side Weight Bearing: As tolerated  Distance (ft): 100 Feet (ft)  Ambulation - Level of Assistance: Contact guard assistance  Assistive Device: Walker, rolling  Speed/Elena: Delayed  Step Length: Left shortened;Right shortened  Stance: Right decreased  Gait Abnormalities: Antalgic;Decreased step clearance  Stairs - Level of Assistance: (no stairs)  Interventions: Safety awareness training;Verbal cues     Braces/Orthotics: none    Right Knee Cold  Type: Cryocuff      Body Structures Involved:  1. Joints  2.  Muscles Body Functions Affected:  1. Sensory/Pain  2. Movement Related Activities and Participation Affected:  1. General Tasks and Demands  2. Mobility   Number of elements that affect the Plan of Care: 4+: HIGH COMPLEXITY   CLINICAL PRESENTATION:   Presentation: Stable and uncomplicated: LOW COMPLEXITY   CLINICAL DECISION MAKIN[address] [place] [id number] AM-PAC 6 Clicks   Basic Mobility Inpatient Short Form  How much difficulty does the patient currently have. .. Unable A Lot A Little None   1. Turning over in bed (including adjusting bedclothes, sheets and blankets)? [] 1   [] 2   [x] 3   [] 4   2. Sitting down on and standing up from a chair with arms ( e.g., wheelchair, bedside commode, etc.)   [] 1   [] 2   [x] 3   [] 4   3. Moving from lying on back to sitting on the side of the bed? [] 1   [] 2   [x] 3   [] 4   How much help from another person does the patient currently need. .. Total A Lot A Little None   4. Moving to and from a bed to a chair (including a wheelchair)? [] 1   [] 2   [x] 3   [] 4   5. Need to walk in hospital room? [] 1   [] 2   [x] 3   [] 4   6. Climbing 3-5 steps with a railing? [x] 1   [] 2   [] 3   [] 4   © , Trustees of [address] [place] [address] [address] [address], under license to [employer]. All rights reserved     Score:  Initial: 16 Most Recent: X (Date: -- )    Interpretation of Tool:  Represents activities that are increasingly more difficult (i.e. Bed mobility, Transfers, Gait). Medical Necessity:     · Patient is expected to demonstrate progress in   · strength, range of motion, balance, coordination, and functional technique  ·  to   · decrease assistance required with bed mobility, transfers & gait  · .  Reason for Services/Other Comments:  · Patient continues to require skilled intervention due to   · Pt not safe with functional mobility  · .    Use of outcome tool(s) and clinical judgement create a POC that gives a: Clear prediction of patient's progress: LOW COMPLEXITY            TREATMENT:   (In addition to Assessment/Re-Assessment sessions the following treatments were rendered)     Pre-treatment Symptoms/Complaints:  Knee pain  Pain Initial: numeric scale     Post Session:  Did not rate but no complaints     Therapeutic Exercise: (15 Minutes):  Exercises per grid below to improve mobility and dynamic movement of leg - right to improve functional endurance . Required minimal verbal cues to promote proper body alignment and promote proper body mechanics. Progressed range and repetitions as indicated. Gait Training (15 Minutes):  Gait training to improve and/or restore physical functioning as related to mobility, strength and balance. Ambulated 100 Feet (ft) with Contact guard assistance using a Walker, rolling and minimal Safety awareness training;Verbal cues related to their stance phase to promote proper body alignment, promote proper body posture and promote proper body mechanics. Date:  7/13 Date:  7/14 Date:     ACTIVITY/EXERCISE AM PM AM PM AM PM   GROUP THERAPY  []  []  []  []  []  []   Ankle Pumps  10 10a 15a     Quad Sets  10 10a 15a     Gluteal Sets  10 10a 15a     Hip ABd/ADduction  10 10a 15a     Straight Leg Raises  10 10aa 15a     Knee Slides  10 10aa 15a     Short Arc Quads  10  15a     Long Arc Quads         Chair Slides    15a              B = bilateral; AA = active assistive; A = active; P = passive      Treatment/Session Assessment:     Response to Treatment:  Did ok, needs someone with him at home but he refuses    Education:  [x] Home Exercises  [x] Fall Precautions  []  [x] D/C Instruction Review  [x] Knee Prosthesis Review  [x] Walker Management/Safety [] Adaptive Equipment as Needed       Interdisciplinary Collaboration:   o Registered Nurse    After treatment position/precautions:   o Up in chair  o Bed alarm/tab alert on  o Bed/Chair-wheels locked  o Bed in low position  o Call light within reach    Compliance with Program/Exercises: not sure.     Recommendations/Intent for next treatment session:  Treatment next visit will focus on increasing Mr. Tom independence with bed mobility, transfers, gait training, strength/ROM exercises, modalities for pain, and patient education.       Total Treatment Duration:  PT Patient Time In/Time Out  Time In: 1300  Time Out: 800 [place] Dequincy Margueritte [place]

## 2020-07-14 NOTE — PROGRESS NOTES
Pt sitting up in recliner, denies pain at this time. Dressing to right  Knee clean dry and intact,  NV status WNL's, pt alert and oriented,  States he will want to go to bed around 10:30,  Reminded pt to call for assistance. No noted distress.

## 2020-07-14 NOTE — PROGRESS NOTES
Shift assessment complete. Pt resting in bed. A&Ox4. Respirations present, even, unlabored. Lung sounds clear to auscultation. Aquacel to right knee c/d/i. Greg Punches in place. Pedal pulses +2 and palpable. Pt able to dorsi/plantar flex. IV capped and patent. Pt complains of pain, medicated with oxycodone 5 mg PO. Bed in lowest position, call light within reach, side rails x3. Encouraged to call for help when needed.

## 2020-07-14 NOTE — PROGRESS NOTES
Problem: Self Care Deficits Care Plan (Adult)  Goal: *Acute Goals and Plan of Care (Insert Text)  Description: GOALS:   DISCHARGE GOALS (in preparation for going home/rehab):  3 days all goals met 7/14/2020  1. Mr. Dariana Ribera will perform one lower body dressing activity with minimal assistance required to demonstrate improved functional mobility and safety. 2.  Mr. Dariana Ribera will perform one lower body bathing activity with minimal assistance required to demonstrate improved functional mobility and safety. 3.  Mr. Dariana Ribera will perform toileting/toilet transfer with contact guard assistance to demonstrate improved functional mobility and safety. 4.  Mr. Dariana Ribera will perform shower transfer with contact guard assistance to demonstrate improved functional mobility and safety. Outcome: Progressing Towards Goal     JOINT CAMP OCCUPATIONAL THERAPY TKA: Daily Note, Discharge and AM 7/14/2020  INPATIENT: Hospital Day: 2  Payor: [facility] MEDICARE / Plan: [employer] [facility] MEDICARE HMO/PPO / Product Type: Managed Care Medicare /      NAME/AGE/GENDER: Ashleigh Mix is a 80 y.o. male   PRIMARY DIAGNOSIS:  Osteoarthritis of right knee, unspecified osteoarthritis type [M17.11]  Acquired deformity of knee, right [M21.961]   Procedure(s) and Anesthesia Type:     * RIGHT KNEE ARTHROPLASTY TOTAL ROBOTIC ASSISTED - Spinal (Right)  ICD-10: Treatment Diagnosis:    · Pain in Right Knee (M25.561)  · Stiffness of Right Knee, Not elsewhere classified (Y70.458)      ASSESSMENT:     Mr. Dariana Ribera is s/p R TKA and presents with decreased weight bearing on R LE and decreased independence with functional mobility and activities of daily living. Patient completed shower and dressing as charter below in ADL grid and is ambulating with rolling walker and contact guard assist.  Patient has met 4/4 goals and plans to return home with family support. Family able to provide patient with appropriate level of assistance at this time.   OT reviewed safety precautions throughout session and therapy schedule for the remainder of today. Patient instructed to call for assistance when needing to get up from recliner and all needs in reach. Pt left on posey pad intact. RN notified. Patient verbalized understanding of call light. This section established at most recent assessment   PROBLEM LIST (Impairments causing functional limitations):  1. Decreased Strength  2. Decreased ADL/Functional Activities  3. Decreased Transfer Abilities  4. Increased Pain  5. Increased Fatigue  6. Decreased Flexibility/Joint Mobility  7. Decreased Knowledge of Precautions   INTERVENTIONS PLANNED: (Benefits and precautions of occupational therapy have been discussed with the patient.)  1. Activities of daily living training  2. Adaptive equipment training  3. Balance training  4. Clothing management  5. Donning&doffing training  6. Theraputic activity     TREATMENT PLAN: Frequency/Duration: Follow patient 1-2 times to address above goals. Rehabilitation Potential For Stated Goals: Good     RECOMMENDED REHABILITATION/EQUIPMENT: (at time of discharge pending progress): Continue Skilled Therapy. OCCUPATIONAL PROFILE AND HISTORY:   History of Present Injury/Illness (Reason for Referral): Pt presents this date s/p (right) TKA. Past Medical History/Comorbidities:   Mr. Kaley Champagne  has a past medical history of Acute lumbar radiculopathy, Arthritis, Ascending aorta dilatation ([address] Atrial fibrillation with RVR ([address] Enlarged prostate, Martinez catheter in place, GERD (gastroesophageal reflux disease), High cholesterol, Hypertension, ICH (intracerebral hemorrhage) ([address] Lumbar stenosis with neurogenic claudication, Other forms of scoliosis, lumbar region, Poor historian, Psychiatric disorder, Seizures ([address] Tongue lesion, and Urinary frequency.   Mr. Kaley Champagne  has a past surgical history that includes hx other surgical; hx turp (10/20/11); hx heent (8/2012); hx orthopaedic (Right); and hx other surgical.  Social History/Living Environment:   Home Environment: Private residence  # Steps to Enter: 0  Wheelchair Ramp: Yes  One/Two Story Residence: One story  Living Alone: Yes  Support Systems: Family member(s)  Patient Expects to be Discharged to[de-identified] Private residence  Current DME Used/Available at Home: Nela Medico, rollator  Prior Level of Function/Work/Activity:  Independent at home with [address] [facility] . noted falls at home       Number of Personal Factors/Comorbidities that affect the Plan of Care: Brief history (0):  LOW COMPLEXITY   ASSESSMENT OF OCCUPATIONAL PERFORMANCE[de-identified]   Most Recent Physical Functioning:   Balance  Sitting: Intact  Standing: With support                              Mental Status  Neurologic State: Alert  Orientation Level: Appropriate for age  Cognition: Follows commands  Perception: Appears intact  Perseveration: No perseveration noted  Safety/Judgement: Fall prevention                Basic ADLs (From Assessment) Complex ADLs (From Assessment)   Basic ADL  Feeding: Independent  Oral Facial Hygiene/Grooming: Supervision  Bathing: Moderate assistance  Type of Bath: Chlorhexidine (CHG), Bath Pack, Full  Upper Body Dressing: Stand-by assistance  Lower Body Dressing: Moderate assistance  Toileting:  Moderate assistance     Grooming/Bathing/Dressing Activities of Daily Living   Grooming  Grooming Assistance: Set-up  Position Performed: Seated in chair  Washing Face: Set-up  Brushing/Combing Hair: Set-up Cognitive Retraining  Safety/Judgement: Fall prevention   Upper Body Bathing  Bathing Assistance: Set-up  Position Performed: Seated in chair  Cues: Verbal cues provided Feeding  Feeding Assistance: Set-up   Lower Body Bathing  Bathing Assistance: Minimum assistance  Perineal  : Set-up  Position Performed: Seated in chair  Cues: Verbal cues provided  Adaptive Equipment: Walker  Lower Body : Minimum assistance  Position Performed: Seated in chair  Cues: Verbal cues provided  Adaptive Equipment: Sandi Farias  Toileting Assistance: Set-up   Upper Body Dressing Assistance  Dressing Assistance: Set-up  Pullover Shirt: Set-up Functional Transfers  Bathroom Mobility: Contact guard assistance  Toilet Transfer : Contact guard assistance  Shower Transfer: Contact guard assistance  Adaptive Equipment: Bedside commode;Walker (comment)   Lower Body Dressing Assistance  Dressing Assistance: Minimum assistance  Underpants: Minimum assistance  Socks: Compensatory technique training( socks)  Adaptive Equipment Used: Walker Bed/Mat Mobility  Sit to Stand: Contact guard assistance  Stand to Sit: Contact guard assistance  Bed to Chair: Contact guard assistance         Physical Skills Involved:  1. Range of Motion  2. Balance  3. Strength Cognitive Skills Affected (resulting in the inability to perform in a timely and safe manner):  1. Sustained Attention Psychosocial Skills Affected:  1. Environmental Adaptation   Number of elements that affect the Plan of Care: 3-5:  MODERATE COMPLEXITY   CLINICAL DECISION MAKIN[address] [place] [address] AM-PAC 6 Clicks   Daily Activity Inpatient Short Form  How much help from another person does the patient currently need. .. Total A Lot A Little None   1. Putting on and taking off regular lower body clothing? [] 1   [] 2   [x] 3   [] 4   2. Bathing (including washing, rinsing, drying)? [] 1   [] 2   [] 3   [x] 4   3. Toileting, which includes using toilet, bedpan or urinal?   [] 1   [] 2   [] 3   [x] 4   4. Putting on and taking off regular upper body clothing? [] 1   [] 2   [] 3   [x] 4   5. Taking care of personal grooming such as brushing teeth? [] 1   [] 2   [] 3   [x] 4   6. Eating meals? [] 1   [] 2   [] 3   [x] 4   © , Trustees of [address] [place] [id number], under license to [employer].  All rights reserved     Score:  Initial: 18 Most Recent: 23 (Date: 20 )    Interpretation of Tool:  Represents activities that are increasingly more difficult (i.e. Bed mobility, Transfers, Gait). Use of outcome tool(s) and clinical judgement create a POC that gives a: LOW COMPLEXITY            TREATMENT:   (In addition to Assessment/Re-Assessment sessions the following treatments were rendered)     Pre-treatment Symptoms/Complaints:  pt tolerated sponge bath  Pain: Initial:   Pain Intensity 1: 2  Pain Location 1: Knee  Pain Orientation 1: Right  Pain Intervention(s) 1: Repositioned  Post Session:  2     Self Care: (40 min): Procedure(s) (per grid) utilized to improve and/or restore self-care/home management as related to dressing, bathing, toileting, grooming and self feeding. Required moderate verbal cueing to facilitate activities of daily living skills and compensatory activities. Treatment/Session Assessment:     Response to Treatment:  pt up in chair tolerated well. Education:  [] Home Exercises  [x] Fall Precautions  [] Hip Precautions [] Going Home Video  [x] Knee/Hip Prosthesis Review  [x] Walker Management/Safety [x] Adaptive Equipment as Needed       Interdisciplinary Collaboration:   o Physical Therapist  o Occupational Therapist  o Registered Nurse    After treatment position/precautions:   o Up in chair  o Bed alarm/tab alert on  o Bed/Chair-wheels locked  o Call light within reach  o RN notified  o LEs reclined     Compliance with Program/Exercises: Compliant all of the time. Recommendations/Intent for next treatment session: Pt doing well all goals met and will do well at home with support from family. Patient will be discharged home with home health PT. No further Occupational Therapy warranted, will discharge Occupational Therapy services.       Total Treatment Duration:  OT Patient Time In/Time Out  Time In: 0830  Time Out: [address] OT

## 2020-07-14 NOTE — PROGRESS NOTES
2020         Post Op day: 1 Day Post-Op     Admit Date: 2020  Admit Diagnosis: Osteoarthritis of right knee, unspecified osteoarthritis type [M17.11]  Acquired deformity of knee, right [M21.961]  OA (osteoarthritis) of knee [M17.10]  Osteoarthritis of right knee [M17.11]        Subjective: Patient stable. No acute events. Objective:   Visit Vitals  /76 (BP 1 Location: Left arm, BP Patient Position: At rest)   Pulse 75   Temp 97.5 °F (36.4 °C)   Resp 16   Ht 6' 1\" (1.854 m)   Wt 105.7 kg (233 lb)   SpO2 95%   BMI 30.74 kg/m²    Temp (24hrs), Av.8 °F (36.6 °C), Min:97.5 °F (36.4 °C), Max:98.3 °F (36.8 °C)    Lab Results   Component Value Date/Time    HGB 11.6 (L) 2020 03:36 AM     Extremity Exam  Dressing clean and dry   Tibialis Anterior and Gastroc-Soleus functioning normally right lower extremity  Sensation intact to light touch on operative limb  Extremity perfused  TEDS/SCDS in place  No sign of DVT     Assessment / Plan :  WBAT RLE  Continue PT/OT  Continue current DVT prophylaxis in house. Discharge on ASA BID    Patient Active Problem List   Diagnosis Code    Psychiatric disorder F99    Hypertension I10    GERD (gastroesophageal reflux disease) K21.9    OA (osteoarthritis) of knee M17.10    Osteoarthritis of right knee M17.11          Signed By: Fatou Matta MD     I have reviewed the patients controlled substance prescription history, as maintained in the [place] prescription monitoring program, so that the prescription(s) for a  controlled substance can be given.

## 2020-07-14 NOTE — PROGRESS NOTES
Care Management Interventions  PCP Verified by CM: Yes  Mode of Transport at Discharge: Self  Transition of Care Consult (CM Consult): [address]: Yes  Discharge Durable Medical Equipment: No  Physical Therapy Consult: Yes  Occupational Therapy Consult: Yes  Current Support Network: Own Home  Confirm Follow Up Transport: Family  The Plan for Transition of Care is Related to the Following Treatment Goals : return to independent function   The Patient and/or Patient Representative was Provided with a Choice of Provider and Agrees with the Discharge Plan?: Yes  Freedom of Choice List was Provided with Basic Dialogue that Supports the Patient's Individualized Plan of Care/Goals, Treatment Preferences and Shares the Quality Data Associated with the Providers?: Yes  Discharge Location  Discharge Placement: Home with home health    Patient is a 80y.o. year old male admitted for Right TKA . Patient plans to return home on discharge. Order received to arrange home health. Patient without preference towards agency. Referral sent to [facility]. Patient  has a walker but he requested a BSC. BSC arranged with Patric and given to his niece who will deliver it to him. Will follow until discharge.

## 2020-07-15 ENCOUNTER — HOME CARE VISIT (OUTPATIENT)
Dept: SCHEDULING | Facility: HOME HEALTH | Age: 85
End: 2020-07-15
Payer: MEDICARE

## 2020-07-15 ENCOUNTER — APPOINTMENT (OUTPATIENT)
Dept: GENERAL RADIOLOGY | Age: 85
DRG: 698 | End: 2020-07-15
Attending: EMERGENCY MEDICINE
Payer: MEDICARE

## 2020-07-15 ENCOUNTER — HOSPITAL ENCOUNTER (INPATIENT)
Age: 85
LOS: 13 days | Discharge: NURSING, REHAB OR LONG-TERM CARE | DRG: 698 | End: 2020-07-29
Attending: EMERGENCY MEDICINE | Admitting: INTERNAL MEDICINE
Payer: MEDICARE

## 2020-07-15 VITALS — HEART RATE: 80 BPM | SYSTOLIC BLOOD PRESSURE: 124 MMHG | DIASTOLIC BLOOD PRESSURE: 60 MMHG | TEMPERATURE: 97.2 F

## 2020-07-15 VITALS
WEIGHT: 233 LBS | TEMPERATURE: 98.7 F | BODY MASS INDEX: 31.56 KG/M2 | DIASTOLIC BLOOD PRESSURE: 58 MMHG | HEART RATE: 76 BPM | HEIGHT: 72 IN | SYSTOLIC BLOOD PRESSURE: 108 MMHG | OXYGEN SATURATION: 94 % | RESPIRATION RATE: 18 BRPM

## 2020-07-15 DIAGNOSIS — F41.9 ANXIETY: ICD-10-CM

## 2020-07-15 DIAGNOSIS — I48.91 ATRIAL FIBRILLATION WITH RVR (HCC): ICD-10-CM

## 2020-07-15 DIAGNOSIS — R65.10 SIRS (SYSTEMIC INFLAMMATORY RESPONSE SYNDROME) (HCC): Primary | ICD-10-CM

## 2020-07-15 LAB
ALBUMIN SERPL-MCNC: 2.9 G/DL (ref 3.2–4.6)
ALBUMIN/GLOB SERPL: 0.7 {RATIO} (ref 1.2–3.5)
ALP SERPL-CCNC: 81 U/L (ref 50–136)
ALT SERPL-CCNC: 13 U/L (ref 12–65)
ANION GAP SERPL CALC-SCNC: 9 MMOL/L (ref 7–16)
ARTERIAL PATENCY WRIST A: YES
AST SERPL-CCNC: 14 U/L (ref 15–37)
BASE EXCESS BLD CALC-SCNC: 0 MMOL/L
BASOPHILS # BLD: 0 K/UL (ref 0–0.2)
BASOPHILS NFR BLD: 0 % (ref 0–2)
BDY SITE: ABNORMAL
BILIRUB SERPL-MCNC: 0.5 MG/DL (ref 0.2–1.1)
BNP SERPL-MCNC: 673 PG/ML
BUN SERPL-MCNC: 35 MG/DL (ref 8–23)
CALCIUM SERPL-MCNC: 8.4 MG/DL (ref 8.3–10.4)
CHLORIDE SERPL-SCNC: 104 MMOL/L (ref 98–107)
CO2 BLD-SCNC: 25 MMOL/L
CO2 SERPL-SCNC: 23 MMOL/L (ref 21–32)
COLLECT TIME,HTIME: 2255
CREAT SERPL-MCNC: 1.33 MG/DL (ref 0.8–1.5)
DIFFERENTIAL METHOD BLD: ABNORMAL
EOSINOPHIL # BLD: 0.1 K/UL (ref 0–0.8)
EOSINOPHIL NFR BLD: 0 % (ref 0.5–7.8)
ERYTHROCYTE [DISTWIDTH] IN BLOOD BY AUTOMATED COUNT: 14 % (ref 11.9–14.6)
FLOW RATE ISTAT,IFRATE: 2 L/MIN
GAS FLOW.O2 O2 DELIVERY SYS: ABNORMAL L/MIN
GLOBULIN SER CALC-MCNC: 4.3 G/DL (ref 2.3–3.5)
GLUCOSE SERPL-MCNC: 131 MG/DL (ref 65–100)
HCO3 BLD-SCNC: 23.6 MMOL/L (ref 22–26)
HCT VFR BLD AUTO: 33.6 % (ref 41.1–50.3)
HGB BLD-MCNC: 10.7 G/DL (ref 13.6–17.2)
IMM GRANULOCYTES # BLD AUTO: 0.1 K/UL (ref 0–0.5)
IMM GRANULOCYTES NFR BLD AUTO: 0 % (ref 0–5)
LACTATE SERPL-SCNC: 2.4 MMOL/L (ref 0.4–2)
LYMPHOCYTES # BLD: 1.8 K/UL (ref 0.5–4.6)
LYMPHOCYTES NFR BLD: 11 % (ref 13–44)
MCH RBC QN AUTO: 31.1 PG (ref 26.1–32.9)
MCHC RBC AUTO-ENTMCNC: 31.8 G/DL (ref 31.4–35)
MCV RBC AUTO: 97.7 FL (ref 79.6–97.8)
MONOCYTES # BLD: 2.3 K/UL (ref 0.1–1.3)
MONOCYTES NFR BLD: 14 % (ref 4–12)
NEUTS SEG # BLD: 12.4 K/UL (ref 1.7–8.2)
NEUTS SEG NFR BLD: 74 % (ref 43–78)
NRBC # BLD: 0 K/UL (ref 0–0.2)
PCO2 BLD: 35.4 MMHG (ref 35–45)
PH BLD: 7.43 [PH] (ref 7.35–7.45)
PLATELET # BLD AUTO: 241 K/UL (ref 150–450)
PMV BLD AUTO: 9.1 FL (ref 9.4–12.3)
PO2 BLD: 68 MMHG (ref 75–100)
POTASSIUM SERPL-SCNC: 4.2 MMOL/L (ref 3.5–5.1)
PROT SERPL-MCNC: 7.2 G/DL (ref 6.3–8.2)
RBC # BLD AUTO: 3.44 M/UL (ref 4.23–5.6)
SAO2 % BLD: 94 % (ref 95–98)
SERVICE CMNT-IMP: ABNORMAL
SERVICE CMNT-IMP: ABNORMAL
SODIUM SERPL-SCNC: 136 MMOL/L (ref 136–145)
SPECIMEN TYPE: ABNORMAL
WBC # BLD AUTO: 16.7 K/UL (ref 4.3–11.1)

## 2020-07-15 PROCEDURE — 81003 URINALYSIS AUTO W/O SCOPE: CPT

## 2020-07-15 PROCEDURE — 96365 THER/PROPH/DIAG IV INF INIT: CPT

## 2020-07-15 PROCEDURE — 87040 BLOOD CULTURE FOR BACTERIA: CPT

## 2020-07-15 PROCEDURE — A4310 INSERT TRAY W/O BAG/CATH: HCPCS

## 2020-07-15 PROCEDURE — G0151 HHCP-SERV OF PT,EA 15 MIN: HCPCS

## 2020-07-15 PROCEDURE — A4331 EXTENSION DRAINAGE TUBING: HCPCS

## 2020-07-15 PROCEDURE — 74011250636 HC RX REV CODE- 250/636: Performed by: EMERGENCY MEDICINE

## 2020-07-15 PROCEDURE — 80053 COMPREHEN METABOLIC PANEL: CPT

## 2020-07-15 PROCEDURE — G0299 HHS/HOSPICE OF RN EA 15 MIN: HCPCS

## 2020-07-15 PROCEDURE — 96367 TX/PROPH/DG ADDL SEQ IV INF: CPT

## 2020-07-15 PROCEDURE — A4338 INDWELLING CATHETER LATEX: HCPCS

## 2020-07-15 PROCEDURE — 3331090001 HH PPS REVENUE CREDIT

## 2020-07-15 PROCEDURE — 87086 URINE CULTURE/COLONY COUNT: CPT

## 2020-07-15 PROCEDURE — 82803 BLOOD GASES ANY COMBINATION: CPT

## 2020-07-15 PROCEDURE — A4358 URINARY LEG OR ABDOMEN BAG: HCPCS

## 2020-07-15 PROCEDURE — 82550 ASSAY OF CK (CPK): CPT

## 2020-07-15 PROCEDURE — 71045 X-RAY EXAM CHEST 1 VIEW: CPT

## 2020-07-15 PROCEDURE — 36600 WITHDRAWAL OF ARTERIAL BLOOD: CPT

## 2020-07-15 PROCEDURE — 96376 TX/PRO/DX INJ SAME DRUG ADON: CPT

## 2020-07-15 PROCEDURE — 83880 ASSAY OF NATRIURETIC PEPTIDE: CPT

## 2020-07-15 PROCEDURE — 3331090002 HH PPS REVENUE DEBIT

## 2020-07-15 PROCEDURE — 74011000250 HC RX REV CODE- 250: Performed by: EMERGENCY MEDICINE

## 2020-07-15 PROCEDURE — 84145 PROCALCITONIN (PCT): CPT

## 2020-07-15 PROCEDURE — 83605 ASSAY OF LACTIC ACID: CPT

## 2020-07-15 PROCEDURE — 74011000258 HC RX REV CODE- 258: Performed by: EMERGENCY MEDICINE

## 2020-07-15 PROCEDURE — 74011250637 HC RX REV CODE- 250/637: Performed by: EMERGENCY MEDICINE

## 2020-07-15 PROCEDURE — 99285 EMERGENCY DEPT VISIT HI MDM: CPT

## 2020-07-15 PROCEDURE — 93005 ELECTROCARDIOGRAM TRACING: CPT | Performed by: EMERGENCY MEDICINE

## 2020-07-15 PROCEDURE — 85025 COMPLETE CBC W/AUTO DIFF WBC: CPT

## 2020-07-15 PROCEDURE — 400013 HH SOC

## 2020-07-15 RX ORDER — ACETAMINOPHEN 500 MG
1000 TABLET ORAL
Status: COMPLETED | OUTPATIENT
Start: 2020-07-15 | End: 2020-07-15

## 2020-07-15 RX ORDER — SODIUM CHLORIDE 9 MG/ML
150 INJECTION, SOLUTION INTRAVENOUS CONTINUOUS
Status: DISCONTINUED | OUTPATIENT
Start: 2020-07-15 | End: 2020-07-17

## 2020-07-15 RX ORDER — DILTIAZEM HYDROCHLORIDE 5 MG/ML
20 INJECTION INTRAVENOUS
Status: COMPLETED | OUTPATIENT
Start: 2020-07-15 | End: 2020-07-15

## 2020-07-15 RX ADMIN — SODIUM CHLORIDE 10 MG/HR: 900 INJECTION, SOLUTION INTRAVENOUS at 23:40

## 2020-07-15 RX ADMIN — DILTIAZEM HYDROCHLORIDE 20 MG: 5 INJECTION INTRAVENOUS at 22:59

## 2020-07-15 RX ADMIN — CEFTRIAXONE SODIUM 2 G: 2 INJECTION, POWDER, FOR SOLUTION INTRAMUSCULAR; INTRAVENOUS at 23:26

## 2020-07-15 RX ADMIN — ACETAMINOPHEN 1000 MG: 500 TABLET, FILM COATED ORAL at 23:19

## 2020-07-15 RX ADMIN — SODIUM CHLORIDE 150 ML/HR: 900 INJECTION, SOLUTION INTRAVENOUS at 22:58

## 2020-07-16 ENCOUNTER — HOME CARE VISIT (OUTPATIENT)
Dept: HOME HEALTH SERVICES | Facility: HOME HEALTH | Age: 85
End: 2020-07-16
Payer: MEDICARE

## 2020-07-16 ENCOUNTER — APPOINTMENT (OUTPATIENT)
Dept: ULTRASOUND IMAGING | Age: 85
DRG: 698 | End: 2020-07-16
Attending: INTERNAL MEDICINE
Payer: MEDICARE

## 2020-07-16 ENCOUNTER — APPOINTMENT (OUTPATIENT)
Dept: GENERAL RADIOLOGY | Age: 85
DRG: 698 | End: 2020-07-16
Attending: INTERNAL MEDICINE
Payer: MEDICARE

## 2020-07-16 PROBLEM — A41.9 SEPSIS (HCC): Status: ACTIVE | Noted: 2020-07-16

## 2020-07-16 PROBLEM — N31.9 NEUROGENIC BLADDER: Status: ACTIVE | Noted: 2020-07-16

## 2020-07-16 PROBLEM — I48.91 ATRIAL FIBRILLATION WITH RVR (HCC): Status: ACTIVE | Noted: 2020-07-16

## 2020-07-16 LAB
ANION GAP SERPL CALC-SCNC: 1 MMOL/L (ref 7–16)
APPEARANCE UR: CLEAR
ATRIAL RATE: 148 BPM
BACTERIA URNS QL MICRO: 0 /HPF
BASOPHILS # BLD: 0 K/UL (ref 0–0.2)
BASOPHILS NFR BLD: 0 % (ref 0–2)
BILIRUB UR QL: NEGATIVE
BUN SERPL-MCNC: 27 MG/DL (ref 8–23)
CALCIUM SERPL-MCNC: 8.2 MG/DL (ref 8.3–10.4)
CALCULATED P AXIS, ECG09: 79 DEGREES
CALCULATED R AXIS, ECG10: -15 DEGREES
CALCULATED T AXIS, ECG11: 30 DEGREES
CHLORIDE SERPL-SCNC: 107 MMOL/L (ref 98–107)
CK SERPL-CCNC: 112 U/L (ref 21–215)
CO2 SERPL-SCNC: 27 MMOL/L (ref 21–32)
COLOR UR: YELLOW
CREAT SERPL-MCNC: 0.98 MG/DL (ref 0.8–1.5)
DIAGNOSIS, 93000: NORMAL
DIFFERENTIAL METHOD BLD: ABNORMAL
EOSINOPHIL # BLD: 0 K/UL (ref 0–0.8)
EOSINOPHIL NFR BLD: 0 % (ref 0.5–7.8)
EPI CELLS #/AREA URNS HPF: ABNORMAL /HPF
ERYTHROCYTE [DISTWIDTH] IN BLOOD BY AUTOMATED COUNT: 14 % (ref 11.9–14.6)
GLUCOSE SERPL-MCNC: 134 MG/DL (ref 65–100)
GLUCOSE UR STRIP.AUTO-MCNC: 100 MG/DL
HCT VFR BLD AUTO: 34.1 % (ref 41.1–50.3)
HGB BLD-MCNC: 9.9 G/DL (ref 13.6–17.2)
HGB UR QL STRIP: ABNORMAL
IMM GRANULOCYTES # BLD AUTO: 0.1 K/UL (ref 0–0.5)
IMM GRANULOCYTES NFR BLD AUTO: 1 % (ref 0–5)
KETONES UR QL STRIP.AUTO: NEGATIVE MG/DL
LACTATE SERPL-SCNC: 0.7 MMOL/L (ref 0.4–2)
LEUKOCYTE ESTERASE UR QL STRIP.AUTO: ABNORMAL
LYMPHOCYTES # BLD: 1.2 K/UL (ref 0.5–4.6)
LYMPHOCYTES NFR BLD: 9 % (ref 13–44)
MCH RBC QN AUTO: 31 PG (ref 26.1–32.9)
MCHC RBC AUTO-ENTMCNC: 29 G/DL (ref 31.4–35)
MCV RBC AUTO: 106.9 FL (ref 79.6–97.8)
MONOCYTES # BLD: 1.8 K/UL (ref 0.1–1.3)
MONOCYTES NFR BLD: 14 % (ref 4–12)
NEUTS SEG # BLD: 9.8 K/UL (ref 1.7–8.2)
NEUTS SEG NFR BLD: 76 % (ref 43–78)
NITRITE UR QL STRIP.AUTO: NEGATIVE
NRBC # BLD: 0 K/UL (ref 0–0.2)
OTHER OBSERVATIONS,UCOM: ABNORMAL
P-R INTERVAL, ECG05: 152 MS
PH UR STRIP: 6 [PH] (ref 5–9)
PLATELET # BLD AUTO: 201 K/UL (ref 150–450)
PMV BLD AUTO: 9.3 FL (ref 9.4–12.3)
POTASSIUM SERPL-SCNC: 4.5 MMOL/L (ref 3.5–5.1)
PROCALCITONIN SERPL-MCNC: 0.13 NG/ML
PROT UR STRIP-MCNC: 30 MG/DL
Q-T INTERVAL, ECG07: 250 MS
QRS DURATION, ECG06: 84 MS
QTC CALCULATION (BEZET), ECG08: 392 MS
RBC # BLD AUTO: 3.19 M/UL (ref 4.23–5.6)
RBC #/AREA URNS HPF: ABNORMAL /HPF
SODIUM SERPL-SCNC: 135 MMOL/L (ref 136–145)
SP GR UR REFRACTOMETRY: 1.02 (ref 1–1.02)
UROBILINOGEN UR QL STRIP.AUTO: 1 EU/DL (ref 0.2–1)
VENTRICULAR RATE, ECG03: 148 BPM
WBC # BLD AUTO: 12.9 K/UL (ref 4.3–11.1)
WBC URNS QL MICRO: ABNORMAL /HPF

## 2020-07-16 PROCEDURE — 74011250636 HC RX REV CODE- 250/636: Performed by: EMERGENCY MEDICINE

## 2020-07-16 PROCEDURE — 74011000250 HC RX REV CODE- 250: Performed by: INTERNAL MEDICINE

## 2020-07-16 PROCEDURE — 87205 SMEAR GRAM STAIN: CPT

## 2020-07-16 PROCEDURE — 74011250636 HC RX REV CODE- 250/636: Performed by: INTERNAL MEDICINE

## 2020-07-16 PROCEDURE — 77010033678 HC OXYGEN DAILY

## 2020-07-16 PROCEDURE — 3331090002 HH PPS REVENUE DEBIT

## 2020-07-16 PROCEDURE — 36415 COLL VENOUS BLD VENIPUNCTURE: CPT

## 2020-07-16 PROCEDURE — 74011250637 HC RX REV CODE- 250/637: Performed by: INTERNAL MEDICINE

## 2020-07-16 PROCEDURE — 93971 EXTREMITY STUDY: CPT

## 2020-07-16 PROCEDURE — 85025 COMPLETE CBC W/AUTO DIFF WBC: CPT

## 2020-07-16 PROCEDURE — 74011000258 HC RX REV CODE- 258: Performed by: EMERGENCY MEDICINE

## 2020-07-16 PROCEDURE — 65610000001 HC ROOM ICU GENERAL

## 2020-07-16 PROCEDURE — 3331090001 HH PPS REVENUE CREDIT

## 2020-07-16 PROCEDURE — 3331090003 HH PPS REVENUE ADJ

## 2020-07-16 PROCEDURE — 73562 X-RAY EXAM OF KNEE 3: CPT

## 2020-07-16 PROCEDURE — 83605 ASSAY OF LACTIC ACID: CPT

## 2020-07-16 PROCEDURE — 74011250636 HC RX REV CODE- 250/636

## 2020-07-16 PROCEDURE — 80048 BASIC METABOLIC PNL TOTAL CA: CPT

## 2020-07-16 RX ORDER — CITALOPRAM 20 MG/1
20 TABLET, FILM COATED ORAL DAILY
Status: DISCONTINUED | OUTPATIENT
Start: 2020-07-16 | End: 2020-07-29 | Stop reason: HOSPADM

## 2020-07-16 RX ORDER — LORAZEPAM 0.5 MG/1
0.5 TABLET ORAL
Status: DISCONTINUED | OUTPATIENT
Start: 2020-07-16 | End: 2020-07-29 | Stop reason: HOSPADM

## 2020-07-16 RX ORDER — LORAZEPAM 2 MG/ML
0.5 INJECTION INTRAMUSCULAR
Status: DISCONTINUED | OUTPATIENT
Start: 2020-07-16 | End: 2020-07-17

## 2020-07-16 RX ORDER — METOPROLOL SUCCINATE 50 MG/1
100 TABLET, EXTENDED RELEASE ORAL
Status: DISCONTINUED | OUTPATIENT
Start: 2020-07-16 | End: 2020-07-29 | Stop reason: HOSPADM

## 2020-07-16 RX ORDER — ASPIRIN 81 MG/1
81 TABLET ORAL 2 TIMES DAILY
Status: DISCONTINUED | OUTPATIENT
Start: 2020-07-16 | End: 2020-07-29 | Stop reason: HOSPADM

## 2020-07-16 RX ORDER — LOSARTAN POTASSIUM 25 MG/1
25 TABLET ORAL
Status: DISCONTINUED | OUTPATIENT
Start: 2020-07-16 | End: 2020-07-29 | Stop reason: HOSPADM

## 2020-07-16 RX ORDER — ONDANSETRON 2 MG/ML
4 INJECTION INTRAMUSCULAR; INTRAVENOUS
Status: DISCONTINUED | OUTPATIENT
Start: 2020-07-16 | End: 2020-07-29 | Stop reason: HOSPADM

## 2020-07-16 RX ORDER — HYOSCYAMINE SULFATE 0.12 MG/1
0.12 TABLET SUBLINGUAL
Status: DISCONTINUED | OUTPATIENT
Start: 2020-07-16 | End: 2020-07-16

## 2020-07-16 RX ORDER — VANCOMYCIN/0.9 % SOD CHLORIDE 1.5G/250ML
1500 PLASTIC BAG, INJECTION (ML) INTRAVENOUS
Status: DISCONTINUED | OUTPATIENT
Start: 2020-07-16 | End: 2020-07-17

## 2020-07-16 RX ORDER — AMOXICILLIN 250 MG
2 CAPSULE ORAL
Status: DISCONTINUED | OUTPATIENT
Start: 2020-07-16 | End: 2020-07-29 | Stop reason: HOSPADM

## 2020-07-16 RX ORDER — POLYETHYLENE GLYCOL 3350 17 G/17G
17 POWDER, FOR SOLUTION ORAL DAILY
Status: DISCONTINUED | OUTPATIENT
Start: 2020-07-16 | End: 2020-07-29 | Stop reason: HOSPADM

## 2020-07-16 RX ORDER — OXYCODONE HYDROCHLORIDE 5 MG/1
5 TABLET ORAL
Status: DISCONTINUED | OUTPATIENT
Start: 2020-07-16 | End: 2020-07-29 | Stop reason: HOSPADM

## 2020-07-16 RX ORDER — METOPROLOL TARTRATE 5 MG/5ML
5 INJECTION INTRAVENOUS
Status: DISCONTINUED | OUTPATIENT
Start: 2020-07-16 | End: 2020-07-29 | Stop reason: HOSPADM

## 2020-07-16 RX ORDER — AMOXICILLIN 250 MG
1 CAPSULE ORAL DAILY
Status: DISCONTINUED | OUTPATIENT
Start: 2020-07-16 | End: 2020-07-29 | Stop reason: HOSPADM

## 2020-07-16 RX ORDER — DILTIAZEM HYDROCHLORIDE 5 MG/ML
10 INJECTION INTRAVENOUS ONCE
Status: COMPLETED | OUTPATIENT
Start: 2020-07-16 | End: 2020-07-16

## 2020-07-16 RX ORDER — TRAZODONE HYDROCHLORIDE 50 MG/1
100 TABLET ORAL
Status: DISCONTINUED | OUTPATIENT
Start: 2020-07-16 | End: 2020-07-29 | Stop reason: HOSPADM

## 2020-07-16 RX ORDER — NALOXONE HYDROCHLORIDE 0.4 MG/ML
0.4 INJECTION, SOLUTION INTRAMUSCULAR; INTRAVENOUS; SUBCUTANEOUS AS NEEDED
Status: DISCONTINUED | OUTPATIENT
Start: 2020-07-16 | End: 2020-07-29 | Stop reason: HOSPADM

## 2020-07-16 RX ORDER — ACETAMINOPHEN 325 MG/1
650 TABLET ORAL
Status: DISCONTINUED | OUTPATIENT
Start: 2020-07-16 | End: 2020-07-20 | Stop reason: SDUPTHER

## 2020-07-16 RX ORDER — SODIUM CHLORIDE 0.9 % (FLUSH) 0.9 %
5-40 SYRINGE (ML) INJECTION EVERY 8 HOURS
Status: DISCONTINUED | OUTPATIENT
Start: 2020-07-16 | End: 2020-07-29 | Stop reason: HOSPADM

## 2020-07-16 RX ORDER — HYDROCODONE BITARTRATE AND ACETAMINOPHEN 5; 325 MG/1; MG/1
1 TABLET ORAL
Status: DISCONTINUED | OUTPATIENT
Start: 2020-07-16 | End: 2020-07-29 | Stop reason: HOSPADM

## 2020-07-16 RX ORDER — SODIUM CHLORIDE 0.9 % (FLUSH) 0.9 %
5-40 SYRINGE (ML) INJECTION AS NEEDED
Status: DISCONTINUED | OUTPATIENT
Start: 2020-07-16 | End: 2020-07-29 | Stop reason: HOSPADM

## 2020-07-16 RX ORDER — ZOLPIDEM TARTRATE 5 MG/1
5 TABLET ORAL
Status: DISCONTINUED | OUTPATIENT
Start: 2020-07-16 | End: 2020-07-26

## 2020-07-16 RX ORDER — PANTOPRAZOLE SODIUM 40 MG/1
40 TABLET, DELAYED RELEASE ORAL
Status: DISCONTINUED | OUTPATIENT
Start: 2020-07-16 | End: 2020-07-29 | Stop reason: HOSPADM

## 2020-07-16 RX ADMIN — OXYCODONE 5 MG: 5 TABLET ORAL at 19:55

## 2020-07-16 RX ADMIN — PANTOPRAZOLE SODIUM 40 MG: 40 TABLET, DELAYED RELEASE ORAL at 08:15

## 2020-07-16 RX ADMIN — LOSARTAN POTASSIUM 25 MG: 25 TABLET ORAL at 22:07

## 2020-07-16 RX ADMIN — ASPIRIN 81 MG: 81 TABLET, COATED ORAL at 08:15

## 2020-07-16 RX ADMIN — SODIUM CHLORIDE 150 ML/HR: 900 INJECTION, SOLUTION INTRAVENOUS at 13:00

## 2020-07-16 RX ADMIN — LORAZEPAM 0.5 MG: 0.5 TABLET ORAL at 19:55

## 2020-07-16 RX ADMIN — SODIUM CHLORIDE 150 ML/HR: 900 INJECTION, SOLUTION INTRAVENOUS at 06:23

## 2020-07-16 RX ADMIN — LORAZEPAM 0.5 MG: 2 INJECTION INTRAMUSCULAR; INTRAVENOUS at 23:59

## 2020-07-16 RX ADMIN — Medication 10 ML: at 22:00

## 2020-07-16 RX ADMIN — DILTIAZEM HYDROCHLORIDE 10 MG: 5 INJECTION INTRAVENOUS at 20:45

## 2020-07-16 RX ADMIN — ASPIRIN 81 MG: 81 TABLET, COATED ORAL at 18:53

## 2020-07-16 RX ADMIN — METOPROLOL SUCCINATE 100 MG: 50 TABLET, EXTENDED RELEASE ORAL at 22:07

## 2020-07-16 RX ADMIN — SODIUM CHLORIDE 150 ML/HR: 900 INJECTION, SOLUTION INTRAVENOUS at 19:57

## 2020-07-16 RX ADMIN — VANCOMYCIN HYDROCHLORIDE 1500 MG: 10 INJECTION, POWDER, LYOPHILIZED, FOR SOLUTION INTRAVENOUS at 19:57

## 2020-07-16 RX ADMIN — CITALOPRAM HYDROBROMIDE 20 MG: 20 TABLET ORAL at 08:15

## 2020-07-16 RX ADMIN — POLYETHYLENE GLYCOL (3350) 17 G: 17 POWDER, FOR SOLUTION ORAL at 11:31

## 2020-07-16 RX ADMIN — SODIUM CHLORIDE 5 MG/HR: 900 INJECTION, SOLUTION INTRAVENOUS at 06:17

## 2020-07-16 RX ADMIN — LORAZEPAM 0.5 MG: 0.5 TABLET ORAL at 16:57

## 2020-07-16 RX ADMIN — ONDANSETRON 4 MG: 2 INJECTION INTRAMUSCULAR; INTRAVENOUS at 06:14

## 2020-07-16 RX ADMIN — LORAZEPAM 0.5 MG: 0.5 TABLET ORAL at 08:16

## 2020-07-16 RX ADMIN — Medication 10 ML: at 06:00

## 2020-07-16 RX ADMIN — HYDROCODONE BITARTRATE AND ACETAMINOPHEN 1 TABLET: 5; 325 TABLET ORAL at 11:31

## 2020-07-16 RX ADMIN — METOPROLOL TARTRATE 5 MG: 5 INJECTION INTRAVENOUS at 12:51

## 2020-07-16 RX ADMIN — DOCUSATE SODIUM 50MG AND SENNOSIDES 8.6MG 1 TABLET: 8.6; 5 TABLET, FILM COATED ORAL at 11:31

## 2020-07-16 RX ADMIN — Medication 10 ML: at 14:00

## 2020-07-16 RX ADMIN — METOPROLOL TARTRATE 5 MG: 5 INJECTION INTRAVENOUS at 16:57

## 2020-07-16 NOTE — PROGRESS NOTES
Vanc Consult    MD ordering: christian CASTANEDA following? no  Indication: sepsis of unk  DOT:  -  days  Goal level(s): 15-20mcgml    Ht Readings from Last 1 Encounters:   07/15/20 6' 1\" (1.854 m)      Wt Readings from Last 1 Encounters:   07/15/20 102.1 kg (225 lb)         Temp (24hrs), Av.4 °F (37.4 °C), Min:97.2 °F (36.2 °C), Max:102 °F (38.9 °C)    Dosing weight: 102.1 kg  85 y.o. Date:  Dose/Freq Admin Times Level/Time:    2.5gm x1 0214     Vanc 1500 mg IV q18h (20)     Vanc 1500 mg IV q18h (14)                  Recent Labs     07/15/20  2249   BUN 35*   CREA 1.33   WBC 16.7*   LAC 2.4*     Estimated Creatinine Clearance: 51 mL/min (based on SCr of 1.33 mg/dL). No results found for: Virginia Villarreal    Day 2  Assessment and Plan: Will continue with Vancomycin 1500 mg IV every 18 hours. The next dose is due 20:00 tonight. Will continue to monitor closely.     Frida Severino, PharmD, BCPS

## 2020-07-16 NOTE — PROGRESS NOTES
Shift Assessment:  Pt alert and oriented x4, very [place], perrla 3 mm brisk, loja to command with pain with movement of right knee noted, no pain at rest.  HR 's, Afib, continues on cardizem gtt titrated to keep HR , 100, all pulses palpable, 2+ pitting edema to BLE noted. Resp: lungs auscultated with crackles to left lower base, all other lobes clear at this time, pt noted to be dyspneic on exertion or with any activity, pt continues on 2 L NC with O2 at 95% at this time. Bowel tones active, no flatus or BM witnessed. Pt has suprapubic catheter in place, yellow discharge around site and on tubing, catheter care provided, diego urine to bag noted. Skin noted with right knee incision, covered with aquacel dressing, old drainage noted,and marked, scabs to upper thighs r/t previous suprapubic catheter secure sites, all piotr, red and pink in color, c/d/i, no odor or drainage noted. PIV's to right hand and right AC patent and intact. Pt positioned for comfort, all questions asked and answered, call light placed within reach.
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
## 2020-07-16 NOTE — ED NOTES
TRANSFER - OUT REPORT:    Verbal report given to Jonna Greco RN  on Chris Bob  being transferred to ICU(unit) for routine progression of care       Report consisted of patients Situation, Background, Assessment and   Recommendations(SBAR). Information from the following report(s) SBAR, Kardex, ED Summary, MAR, Recent Results and Cardiac Rhythm A-fibb was reviewed with the receiving nurse. Lines:   Peripheral IV 07/15/20 Right Antecubital (Active)   Site Assessment Clean, dry, & intact 07/15/20 2246   Phlebitis Assessment 0 07/15/20 2246   Infiltration Assessment 0 07/15/20 2246   Dressing Type Gauze;Tape;Transparent 07/15/20 2246   Hub Color/Line Status Pink;Flushed 07/15/20 2246   Action Taken Blood drawn 07/15/20 2246       Peripheral IV 07/15/20 Right Hand (Active)   Site Assessment Clean, dry, & intact 07/15/20 2333   Phlebitis Assessment 0 07/15/20 2333   Infiltration Assessment 0 07/15/20 2333   Dressing Status Clean, dry, & intact 07/15/20 2333   Dressing Type Transparent;Tape 07/15/20 2333   Hub Color/Line Status Pink;Positional;Flushed 07/15/20 2333   Action Taken Blood drawn 07/15/20 2333        Opportunity for questions and clarification was provided.       Patient transported with:   Monitor  O2 @ 2 liters  Registered Nurse

## 2020-07-16 NOTE — PROGRESS NOTES
Hospitalist Note     Admit Date:  7/15/2020 10:37 PM   Name:  Sarah Castrejon   Age:  80 y.o.  :  1934   MRN:  [id number]   PCP:  Chato Evans MD  Treatment Team: Attending Provider: Pierre Leon MD; Utilization Review: Tanisha Haines RN; Primary Nurse: Lisseth Giraldo RN    HPI/Subjective:   Mr. Kacy Calero is a nice 79 y/o WM sent to the ER from home on 7/15 for confusion. He recently underwent right TKA on . Yesterday a friend thought he was confused and dizzy and sent him to the ER. He was in rapid AFib and met sepsis criteria. CXR negative. Cultures collected. He was started on antibiotics and a Cardizem drip and admitted. Ortho consulted. : Sleeping comfortably. Thinks his RLE is less swollen than it was after surgery. Does still have pain and discomfort but denies pain anywhere else. Suprapubic cath changed, UA neg last night. Cardizem drip off, he's already back in NSR.     No other complaints  Objective:     Patient Vitals for the past 24 hrs:   Temp Pulse Resp BP SpO2   20 0808     95 %   20 0700  99 (!) 32 114/62 96 %   20 0400  99  106/63    20 0345  97 30 103/64 95 %   20 0330  96 (!) 33 117/57 97 %   20 0315  95 30 108/57 95 %   20 0300  100 (!) 45 100/57 94 %   20 0245  (!) 105 (!) 32 95/55 94 %   20 0230  91 29 102/59 95 %   20 0215  (!) 105 29 103/56 97 %   20 0200 99.8 °F (37.7 °C) (!) 106 27 102/52 95 %   20 0145  (!) 133 24 103/57 95 %   20 0140  (!) 125 27 90/51 95 %   20 0100  100 28 100/55 94 %   20 0055  (!) 117 27 107/60 93 %   20 0050  (!) 110 (!) 40 103/62 95 %   20 0045  (!) 114 (!) 45 108/55 95 %   20 0040 99.4 °F (37.4 °C) (!) 111 28 113/57 94 %   20 0035  (!) 118 (!) 31 107/59 93 %   20 0030  (!) 108 29 108/56 93 %   20 0025  (!) 122 (!) 34 103/56 94 %   20 0020  (!) 122 29 110/55 94 %   20 0015  (!) 131 25 95/57 92 %   07/16/20 0010  (!) 139 22 116/56 93 %   07/16/20 0005  (!) 124 28 120/61 94 %   07/16/20 0002 99.9 °F (37.7 °C)       07/15/20 2349  (!) 143 (!) 32 107/60 94 %   07/15/20 2334  (!) 140 (!) 33 110/66 92 %   07/15/20 2319  (!) 143 (!) 49 107/65 92 %   07/15/20 2316 (!) 102 °F (38.9 °C)       07/15/20 2304  (!) 115 (!) 37 103/55 97 %   07/15/20 2300     (!) 86 %   07/15/20 2259  (!) 156  156/65    07/15/20 2249  (!) 146 (!) 40 154/65 93 %   07/15/20 2238    142/63    07/15/20 2235 99 °F (37.2 °C) (!) 144 (!) 32 143/61 (!) 89 %     Oxygen Therapy  O2 Sat (%): 95 % (07/16/20 0808)  Pulse via Oximetry: 109 beats per minute (07/16/20 0808)  O2 Device: Nasal cannula (07/16/20 0808)  O2 Flow Rate (L/min): 2 l/min (07/16/20 0808)    Estimated body mass index is 29.69 kg/m² as calculated from the following:    Height as of this encounter: 6' 1\" (1.854 m). Weight as of this encounter: 102.1 kg (225 lb). Intake/Output Summary (Last 24 hours) at 7/16/2020 1100  Last data filed at 7/16/2020 0618  Gross per 24 hour   Intake    Output 350 ml   Net -350 ml       *Note that automatically entered I/Os may not be accurate; dependent on patient compliance with collection and accurate  by [employer]. General:    Well nourished. Alert. Obese. CV:   RRR. No murmur, rub, or gallop. Lungs:   CTAB. No wheezing, rhonchi, or rales. Abdomen:   Soft, nontender, nondistended. There is some mild erythema and purulence surrounding the insertion site of his suprapubic catheter. Urine is dark yellow and clear. No pain to palpation of the area. Extremities: Warm and dry. No cyanosis. B/l LE stasis dermatitis. Surgical bandage present on the right knee which is swollen; below the right knee is warm to touch with pitting edema (both more pronounced than the left), but patient thinks it has improved. Skin:     No rashes or jaundice. LE exam as above.   Neuro:  No gross focal deficits    Data Reviewed:  I have reviewed all labs, meds, and studies from the last 24 hours:  Recent Results (from the past 24 hour(s))   EKG, 12 LEAD, INITIAL    Collection Time: 07/15/20 10:48 PM   Result Value Ref Range    Ventricular Rate 148 BPM    Atrial Rate 148 BPM    P-R Interval 152 ms    QRS Duration 84 ms    Q-T Interval 250 ms    QTC Calculation (Bezet) 392 ms    Calculated P Axis 79 degrees    Calculated R Axis -15 degrees    Calculated T Axis 30 degrees    Diagnosis       Sinus tachycardia  Left ventricular hypertrophy with repolarization abnormality  Possible Lateral infarct , age undetermined  Abnormal ECG  When compared with ECG of 06-JUL-2020 14:59,  Vent. rate has increased BY  76 BPM  Borderline criteria for Lateral infarct are now Present  ST now depressed in Lateral leads  T wave inversion now evident in Inferior leads  T wave inversion now evident in Anterior leads  Confirmed by JAUN WEST (), Tyron Barraza ([id number]) on 7/16/2020 8:31:54 AM     CBC WITH AUTOMATED DIFF    Collection Time: 07/15/20 10:49 PM   Result Value Ref Range    WBC 16.7 (H) 4.3 - 11.1 K/uL    RBC 3.44 (L) 4.23 - 5.6 M/uL    HGB 10.7 (L) 13.6 - 17.2 g/dL    HCT 33.6 (L) 41.1 - 50.3 %    MCV 97.7 79.6 - 97.8 FL    MCH 31.1 26.1 - 32.9 PG    MCHC 31.8 31.4 - 35.0 g/dL    RDW 14.0 11.9 - 14.6 %    PLATELET 581 001 - 132 K/uL    MPV 9.1 (L) 9.4 - 12.3 FL    ABSOLUTE NRBC 0.00 0.0 - 0.2 K/uL    DF AUTOMATED      NEUTROPHILS 74 43 - 78 %    LYMPHOCYTES 11 (L) 13 - 44 %    MONOCYTES 14 (H) 4.0 - 12.0 %    EOSINOPHILS 0 (L) 0.5 - 7.8 %    BASOPHILS 0 0.0 - 2.0 %    IMMATURE GRANULOCYTES 0 0.0 - 5.0 %    ABS. NEUTROPHILS 12.4 (H) 1.7 - 8.2 K/UL    ABS. LYMPHOCYTES 1.8 0.5 - 4.6 K/UL    ABS. MONOCYTES 2.3 (H) 0.1 - 1.3 K/UL    ABS. EOSINOPHILS 0.1 0.0 - 0.8 K/UL    ABS. BASOPHILS 0.0 0.0 - 0.2 K/UL    ABS. IMM.  GRANS. 0.1 0.0 - 0.5 K/UL   METABOLIC PANEL, COMPREHENSIVE    Collection Time: 07/15/20 10:49 PM   Result Value Ref Range Sodium 136 136 - 145 mmol/L    Potassium 4.2 3.5 - 5.1 mmol/L    Chloride 104 98 - 107 mmol/L    CO2 23 21 - 32 mmol/L    Anion gap 9 7 - 16 mmol/L    Glucose 131 (H) 65 - 100 mg/dL    BUN 35 (H) 8 - 23 MG/DL    Creatinine 1.33 0.8 - 1.5 MG/DL    GFR est AA >60 >60 ml/min/1.73m2    GFR est non-AA 54 (L) >60 ml/min/1.73m2    Calcium 8.4 8.3 - 10.4 MG/DL    Bilirubin, total 0.5 0.2 - 1.1 MG/DL    ALT (SGPT) 13 12 - 65 U/L    AST (SGOT) 14 (L) 15 - 37 U/L    Alk.  phosphatase 81 50 - 136 U/L    Protein, total 7.2 6.3 - 8.2 g/dL    Albumin 2.9 (L) 3.2 - 4.6 g/dL    Globulin 4.3 (H) 2.3 - 3.5 g/dL    A-G Ratio 0.7 (L) 1.2 - 3.5     CULTURE, BLOOD    Collection Time: 07/15/20 10:49 PM    Specimen: Blood   Result Value Ref Range    Special Requests: RIGHT  Antecubital        Culture result: NO GROWTH AFTER 7 HOURS     LACTIC ACID    Collection Time: 07/15/20 10:49 PM   Result Value Ref Range    Lactic acid 2.4 (HH) 0.4 - 2.0 MMOL/L   PROCALCITONIN    Collection Time: 07/15/20 10:49 PM   Result Value Ref Range    Procalcitonin 0.13 ng/mL   NT-PRO BNP    Collection Time: 07/15/20 10:49 PM   Result Value Ref Range    NT pro- (H) <450 PG/ML   CK    Collection Time: 07/15/20 10:49 PM   Result Value Ref Range     21 - 215 U/L   POC G3    Collection Time: 07/15/20 10:59 PM   Result Value Ref Range    Device: NASAL CANNULA      pH (POC) 7.43 7.35 - 7.45      pCO2 (POC) 35.4 35 - 45 MMHG    pO2 (POC) 68 (L) 75 - 100 MMHG    HCO3 (POC) 23.6 22 - 26 MMOL/L    sO2 (POC) 94 (L) 95 - 98 %    Base excess (POC) 0 mmol/L    Allens test (POC) YES      Site RIGHT RADIAL      Specimen type (POC) ARTERIAL      [facility]     CO2, POC 25 MMOL/L    Flow rate (POC) 2.000 L/min    Critical value read back 00:01     COLLECT TIME 2,255     URINALYSIS W/ RFLX MICROSCOPIC    Collection Time: 07/15/20 11:21 PM   Result Value Ref Range    Color YELLOW      Appearance CLEAR      Specific gravity 1.020 1.001 - 1.023      pH (UA) 6.0 5.0 - 9.0      Protein 30 (A) NEG mg/dL    Glucose 100 (A) NEG mg/dL    Ketone Negative NEG mg/dL    Bilirubin Negative NEG      Blood LARGE (A) NEG      Urobilinogen 1.0 0.2 - 1.0 EU/dL    Nitrites Negative NEG      Leukocyte Esterase SMALL (A) NEG      WBC 0-3 0 /hpf    RBC 0-3 0 /hpf    Epithelial cells 5-10 0 /hpf    Bacteria 0 0 /hpf    Other observations RESULTS VERIFIED MANUALLY     CULTURE, URINE    Collection Time: 07/15/20 11:21 PM    Specimen: Urine   Result Value Ref Range    Special Requests: NO SPECIAL REQUESTS      Culture result:        NO GROWTH AFTER SHORT PERIOD OF INCUBATION. FURTHER RESULTS TO FOLLOW AFTER OVERNIGHT INCUBATION. CULTURE, BLOOD    Collection Time: 07/15/20 11:34 PM    Specimen: Blood   Result Value Ref Range    Special Requests: RIGHT  HAND        Culture result: NO GROWTH AFTER 6 HOURS     LACTIC ACID    Collection Time: 07/16/20  5:28 AM   Result Value Ref Range    Lactic acid 0.7 0.4 - 2.0 MMOL/L   CBC WITH AUTOMATED DIFF    Collection Time: 07/16/20  8:42 AM   Result Value Ref Range    WBC 12.9 (H) 4.3 - 11.1 K/uL    RBC 3.19 (L) 4.23 - 5.6 M/uL    HGB 9.9 (L) 13.6 - 17.2 g/dL    HCT 34.1 (L) 41.1 - 50.3 %    .9 (H) 79.6 - 97.8 FL    MCH 31.0 26.1 - 32.9 PG    MCHC 29.0 (L) 31.4 - 35.0 g/dL    RDW 14.0 11.9 - 14.6 %    PLATELET 958 069 - 092 K/uL    MPV 9.3 (L) 9.4 - 12.3 FL    ABSOLUTE NRBC 0.00 0.0 - 0.2 K/uL    DF AUTOMATED      NEUTROPHILS 76 43 - 78 %    LYMPHOCYTES 9 (L) 13 - 44 %    MONOCYTES 14 (H) 4.0 - 12.0 %    EOSINOPHILS 0 (L) 0.5 - 7.8 %    BASOPHILS 0 0.0 - 2.0 %    IMMATURE GRANULOCYTES 1 0.0 - 5.0 %    ABS. NEUTROPHILS 9.8 (H) 1.7 - 8.2 K/UL    ABS. LYMPHOCYTES 1.2 0.5 - 4.6 K/UL    ABS. MONOCYTES 1.8 (H) 0.1 - 1.3 K/UL    ABS. EOSINOPHILS 0.0 0.0 - 0.8 K/UL    ABS. BASOPHILS 0.0 0.0 - 0.2 K/UL    ABS. IMM.  GRANS. 0.1 0.0 - 0.5 K/UL   METABOLIC PANEL, BASIC    Collection Time: 07/16/20  8:42 AM   Result Value Ref Range    Sodium 135 (L) 136 - 145 mmol/L    Potassium 4.5 3.5 - 5.1 mmol/L    Chloride 107 98 - 107 mmol/L    CO2 27 21 - 32 mmol/L    Anion gap 1 (L) 7 - 16 mmol/L    Glucose 134 (H) 65 - 100 mg/dL    BUN 27 (H) 8 - 23 MG/DL    Creatinine 0.98 0.8 - 1.5 MG/DL    GFR est AA >60 >60 ml/min/1.73m2    GFR est non-AA >60 >60 ml/min/1.73m2    Calcium 8.2 (L) 8.3 - 10.4 MG/DL        Current Meds:  Current Facility-Administered Medications   Medication Dose Route Frequency    sodium chloride (NS) flush 5-40 mL  5-40 mL IntraVENous Q8H    sodium chloride (NS) flush 5-40 mL  5-40 mL IntraVENous PRN    acetaminophen (TYLENOL) tablet 650 mg  650 mg Oral Q4H PRN    HYDROcodone-acetaminophen (NORCO) 5-325 mg per tablet 1 Tab  1 Tab Oral Q4H PRN    naloxone (NARCAN) injection 0.4 mg  0.4 mg IntraVENous PRN    ondansetron (ZOFRAN) injection 4 mg  4 mg IntraVENous Q4H PRN    senna-docusate (PERICOLACE) 8.6-50 mg per tablet 2 Tab  2 Tab Oral DAILY PRN    LORazepam (ATIVAN) tablet 0.5 mg  0.5 mg Oral Q4H PRN    zolpidem (AMBIEN) tablet 5 mg  5 mg Oral QHS PRN    aspirin delayed-release tablet 81 mg  81 mg Oral BID    citalopram (CELEXA) tablet 20 mg  20 mg Oral DAILY    losartan (COZAAR) tablet 25 mg  25 mg Oral QHS    metoprolol tartrate (LOPRESSOR) tablet 100 mg  100 mg Oral QHS    pantoprazole (PROTONIX) tablet 40 mg  40 mg Oral ACB    oxyCODONE IR (ROXICODONE) tablet 5 mg  5 mg Oral Q4H PRN    polyethylene glycol (MIRALAX) packet 17 g  17 g Oral DAILY    senna-docusate (PERICOLACE) 8.6-50 mg per tablet 1 Tab  1 Tab Oral DAILY    traZODone (DESYREL) tablet 100 mg  100 mg Oral QHS PRN    hyoscyamine SL (LEVSIN/SL) tablet 0.125 mg  0.125 mg SubLINGual Q4H PRN    cefTRIAXone (ROCEPHIN) 2 g in 0.9% sodium chloride (MBP/ADV) 50 mL  2 g IntraVENous Q24H    vancomycin (VANCOCIN) 1500 mg in  ml infusion  1,500 mg IntraVENous Q18H    0.9% sodium chloride infusion  150 mL/hr IntraVENous CONTINUOUS    dilTIAZem (CARDIZEM) 100 mg in 0.9% sodium chloride (MBP/ADV) 100 mL infusion  0-15 mg/hr IntraVENous TITRATE       Other Studies:  No results found for this visit on 07/15/20. Xr Chest Port    Result Date: 7/15/2020  EXAM: Chest x-ray. INDICATION: Dyspnea. COMPARISON: None. TECHNIQUE: Single frontal view. FINDINGS: The lungs are clear. The heart is enlarged. Mediastinal contour and pulmonary vasculature are within normal limits. No pneumothorax or pleural effusion is seen. IMPRESSION: 1. No acute process. 2. Cardiomegaly. Duplex Lower Ext Venous Right    Result Date: 7/16/2020  RIGHT LOWER EXTREMITY DEEP VENOUS SONOGRAPHY: CLINICAL HISTORY: Leg pain and swelling after recent right knee surgery. FINDINGS: Multiple images from real time ultrasound evaluation of the deep venous system of the right leg demonstrate normal venous flow in the posterior tibial, popliteal, and superficial and common femoral veins. Normal compressibility was demonstrated. Flow was also documented in the proximal saphenous and deep femoral veins. No intraluminal echogenic material was seen to suggest the presence of nonobstructive thrombus. IMPRESSION: NO ULTRASOUND EVIDENCE OF DEEP VENOUS THROMBOSIS IN THE RIGHT LEG. All Micro Results     Procedure Component Value Units Date/Time    CULTURE, Eudelia Colton STAIN [[id number]]     Order Status:  Sent Specimen:  Wound from Skin     CULTURE, URINE [[id number]] Collected:  07/15/20 2321    Order Status:  Completed Specimen:  Urine Updated:  07/16/20 0855     Special Requests: NO SPECIAL REQUESTS        Culture result:       NO GROWTH AFTER SHORT PERIOD OF INCUBATION. FURTHER RESULTS TO FOLLOW AFTER OVERNIGHT INCUBATION.           CULTURE, BLOOD [[id number]] Collected:  07/15/20 2249    Order Status:  Completed Specimen:  Blood Updated:  07/16/20 0644     Special Requests: --        RIGHT  Antecubital       Culture result: NO GROWTH AFTER 7 HOURS       CULTURE, BLOOD [[id number]] Collected:  07/15/20 2334    Order Status:  Completed Specimen:  Blood Updated:  07/16/20 0644     Special Requests: --        RIGHT  HAND       Culture result: NO GROWTH AFTER 6 HOURS             SARS-CoV-2 Lab Results  \"Novel Coronavirus\" Test: No results found for: COV2NT   \"Emergent Disease\" Test: No results found for: EDPR  \"SARS-COV-2\" Test: No results found for: XGCOVT         Assessment and Plan:     Hospital Problems as of 7/16/2020 Date Reviewed: 7/13/2020          Codes Class Noted - Resolved POA    Atrial fibrillation with RVR ([facility] [address] ICD-10-CM: I48.91  ICD-9-CM: 427.31  7/16/2020 - Present Yes        * (Principal) Sepsis ([facility] [address] ICD-10-CM: A41.9  ICD-9-CM: 038.9, 995.91  7/16/2020 - Present Yes        Neurogenic bladder ICD-10-CM: N31.9  ICD-9-CM: 596.54  7/16/2020 - Present Yes        Hypertension ICD-10-CM: I10  ICD-9-CM: 401.9  Unknown - Present Yes        GERD (gastroesophageal reflux disease) ICD-10-CM: K21.9  ICD-9-CM: 530.81  Unknown - Present Yes    Overview Signed 3/15/2017  2:59 PM by Ryan wong w/med                   Plan:  # Sepsis, urinary source vs right knee   - Sepsis resolved. WBCs improved. Afebrile. No in NSR.   - RLE certainly is warm swollen, mild erythema. Ortho consulted. On empiric vancomycin and ceftriaxone. Cultures are pending.    - Swab catheter insertion site given purulence and erythema. Follow up blood and urine cultures (though UA last night was negative). No change to abx for now. # AFib RVR   - 2/2 acute illness/sepsis   - Resolved, off Cardizem and now in NSR. Con't home BB.   - Would hold on [facility] now since he's in SR and early post-op period. # Neurogenic bladder with suprapubic catheter   - Cath site with redness and drainage. No ttp. Will culture it given appearance and sepsis. Catheter was exchanged last night. UA clean. # HTN   - Home meds. # GERD   - PPI    # MDD/anxiety   - Celexa; Ativan prn     DC planning/Dispo: Unclear, hopefully home.   Diet:  DIET REGULAR  DVT ppx: SCDs    Signed:  Carlos Dow MD

## 2020-07-16 NOTE — PROGRESS NOTES
End of shift note:  Pt c/o nausea early this AM, given zofran, pt c/o pain to right knee rates as 4/10, which patient state is acceptable at this time. Old suprapubic catheter removed, and new catheter inserted with sterile technique witnessed by Williams Moreland RN. Pt continues on cardizem gtt down to 5 mg/hr at this time with HR 90's and sbp 's, pt continues on 2 L NC with O 2 sat maintained > 92% throughout this shift. Bowel tones remain active, no flatus or BM witnessed this shift, clear yellow urine via suprapubic catheter. Skin remains unchanged since arrival.  PIV's patent and intact. Pt positioned for comfort, and call light placed within reach.

## 2020-07-16 NOTE — PROGRESS NOTES
Arrival:  Pt arrive to ICU from ED via stretcher, pt unable to move unassisted, r/t right tka 2 days prior, pt  Placed on unit monitor, continues on NS infusion and cardizem gtt titrated to keep HR < 100, CHG bath given , along with pericare around suprapubic catheter given, no pressure injuries noted, skin noted with dressing to R knee, old drainage to dressing noted, marked. Pt has watch, rings x2 and unknown \"small\" amount of cash per patient, pt request to keep jewelry, cell phone, and cash at bedside.

## 2020-07-16 NOTE — PROGRESS NOTES
Care Management Interventions  Transition of Care Consult (CM Consult): Discharge Planning  Physical Therapy Consult: Yes  Occupational Therapy Consult: Yes  The Patient and/or Patient Representative was Provided with a Choice of Provider and Agrees with the Discharge Plan?: Yes  Freedom of Choice List was Provided with Basic Dialogue that Supports the Patient's Individualized Plan of Care/Goals, Treatment Preferences and Shares the Quality Data Associated with the Providers?: Yes  Discharge Location  Discharge Placement: Unable to determine at this time    Chart reviewed briefly. Pt just had TKA 3 days ago. Friend noted AMS and pt brought back to hospital. Pt admitted to ICU with afib and sepsis. Pt was sent home with home health care services and DME after knee replacement. Sw will need to investigate further about pt's ability to care for himself and if IV antibiotics are going to be indicated at discharge. Sw to cont to follow and assist. Will talk with pt about plans.  Linh Lee
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
## 2020-07-17 LAB
ANION GAP SERPL CALC-SCNC: 4 MMOL/L (ref 7–16)
BASOPHILS # BLD: 0.1 K/UL (ref 0–0.2)
BASOPHILS NFR BLD: 0 % (ref 0–2)
BUN SERPL-MCNC: 20 MG/DL (ref 8–23)
CALCIUM SERPL-MCNC: 7.8 MG/DL (ref 8.3–10.4)
CHLORIDE SERPL-SCNC: 107 MMOL/L (ref 98–107)
CO2 SERPL-SCNC: 24 MMOL/L (ref 21–32)
CREAT SERPL-MCNC: 0.88 MG/DL (ref 0.8–1.5)
DIFFERENTIAL METHOD BLD: ABNORMAL
EOSINOPHIL # BLD: 0.2 K/UL (ref 0–0.8)
EOSINOPHIL NFR BLD: 1 % (ref 0.5–7.8)
ERYTHROCYTE [DISTWIDTH] IN BLOOD BY AUTOMATED COUNT: 14 % (ref 11.9–14.6)
GLUCOSE SERPL-MCNC: 121 MG/DL (ref 65–100)
HCT VFR BLD AUTO: 28.5 % (ref 41.1–50.3)
HGB BLD-MCNC: 8.8 G/DL (ref 13.6–17.2)
IMM GRANULOCYTES # BLD AUTO: 0.1 K/UL (ref 0–0.5)
IMM GRANULOCYTES NFR BLD AUTO: 1 % (ref 0–5)
LYMPHOCYTES # BLD: 1.8 K/UL (ref 0.5–4.6)
LYMPHOCYTES NFR BLD: 12 % (ref 13–44)
MCH RBC QN AUTO: 31 PG (ref 26.1–32.9)
MCHC RBC AUTO-ENTMCNC: 30.9 G/DL (ref 31.4–35)
MCV RBC AUTO: 100.4 FL (ref 79.6–97.8)
MONOCYTES # BLD: 1.7 K/UL (ref 0.1–1.3)
MONOCYTES NFR BLD: 11 % (ref 4–12)
NEUTS SEG # BLD: 11.4 K/UL (ref 1.7–8.2)
NEUTS SEG NFR BLD: 75 % (ref 43–78)
NRBC # BLD: 0 K/UL (ref 0–0.2)
PLATELET # BLD AUTO: 208 K/UL (ref 150–450)
PMV BLD AUTO: 9.4 FL (ref 9.4–12.3)
POTASSIUM SERPL-SCNC: 4.5 MMOL/L (ref 3.5–5.1)
RBC # BLD AUTO: 2.84 M/UL (ref 4.23–5.6)
SODIUM SERPL-SCNC: 135 MMOL/L (ref 136–145)
WBC # BLD AUTO: 15.2 K/UL (ref 4.3–11.1)

## 2020-07-17 PROCEDURE — 74011250636 HC RX REV CODE- 250/636: Performed by: INTERNAL MEDICINE

## 2020-07-17 PROCEDURE — 85025 COMPLETE CBC W/AUTO DIFF WBC: CPT

## 2020-07-17 PROCEDURE — 97530 THERAPEUTIC ACTIVITIES: CPT

## 2020-07-17 PROCEDURE — 65270000029 HC RM PRIVATE

## 2020-07-17 PROCEDURE — 3331090001 HH PPS REVENUE CREDIT

## 2020-07-17 PROCEDURE — 74011250637 HC RX REV CODE- 250/637: Performed by: INTERNAL MEDICINE

## 2020-07-17 PROCEDURE — 36415 COLL VENOUS BLD VENIPUNCTURE: CPT

## 2020-07-17 PROCEDURE — 97110 THERAPEUTIC EXERCISES: CPT

## 2020-07-17 PROCEDURE — 97161 PT EVAL LOW COMPLEX 20 MIN: CPT

## 2020-07-17 PROCEDURE — 77010033678 HC OXYGEN DAILY

## 2020-07-17 PROCEDURE — 74011000258 HC RX REV CODE- 258: Performed by: INTERNAL MEDICINE

## 2020-07-17 PROCEDURE — 80048 BASIC METABOLIC PNL TOTAL CA: CPT

## 2020-07-17 PROCEDURE — 3331090002 HH PPS REVENUE DEBIT

## 2020-07-17 RX ORDER — FUROSEMIDE 10 MG/ML
40 INJECTION INTRAMUSCULAR; INTRAVENOUS DAILY
Status: DISCONTINUED | OUTPATIENT
Start: 2020-07-17 | End: 2020-07-18

## 2020-07-17 RX ORDER — VANCOMYCIN/0.9 % SOD CHLORIDE 1.5G/250ML
1500 PLASTIC BAG, INJECTION (ML) INTRAVENOUS EVERY 12 HOURS
Status: DISCONTINUED | OUTPATIENT
Start: 2020-07-17 | End: 2020-07-18

## 2020-07-17 RX ADMIN — Medication 10 ML: at 22:00

## 2020-07-17 RX ADMIN — LORAZEPAM 0.5 MG: 0.5 TABLET ORAL at 07:46

## 2020-07-17 RX ADMIN — OXYCODONE 5 MG: 5 TABLET ORAL at 04:13

## 2020-07-17 RX ADMIN — CEFTRIAXONE SODIUM 2 G: 2 INJECTION, POWDER, FOR SOLUTION INTRAMUSCULAR; INTRAVENOUS at 23:12

## 2020-07-17 RX ADMIN — ASPIRIN 81 MG: 81 TABLET, COATED ORAL at 18:38

## 2020-07-17 RX ADMIN — LOSARTAN POTASSIUM 25 MG: 25 TABLET ORAL at 21:26

## 2020-07-17 RX ADMIN — LORAZEPAM 0.5 MG: 0.5 TABLET ORAL at 21:26

## 2020-07-17 RX ADMIN — POLYETHYLENE GLYCOL (3350) 17 G: 17 POWDER, FOR SOLUTION ORAL at 09:00

## 2020-07-17 RX ADMIN — PANTOPRAZOLE SODIUM 40 MG: 40 TABLET, DELAYED RELEASE ORAL at 07:46

## 2020-07-17 RX ADMIN — CEFTRIAXONE SODIUM 2 G: 2 INJECTION, POWDER, FOR SOLUTION INTRAMUSCULAR; INTRAVENOUS at 00:07

## 2020-07-17 RX ADMIN — METOPROLOL SUCCINATE 100 MG: 50 TABLET, EXTENDED RELEASE ORAL at 21:25

## 2020-07-17 RX ADMIN — Medication 10 ML: at 06:00

## 2020-07-17 RX ADMIN — LORAZEPAM 0.5 MG: 2 INJECTION INTRAMUSCULAR; INTRAVENOUS at 04:14

## 2020-07-17 RX ADMIN — ASPIRIN 81 MG: 81 TABLET, COATED ORAL at 07:45

## 2020-07-17 RX ADMIN — ACETAMINOPHEN 650 MG: 325 TABLET, FILM COATED ORAL at 19:44

## 2020-07-17 RX ADMIN — Medication 10 ML: at 14:00

## 2020-07-17 RX ADMIN — CITALOPRAM HYDROBROMIDE 20 MG: 20 TABLET ORAL at 07:44

## 2020-07-17 RX ADMIN — DOCUSATE SODIUM 50MG AND SENNOSIDES 8.6MG 1 TABLET: 8.6; 5 TABLET, FILM COATED ORAL at 09:00

## 2020-07-17 RX ADMIN — VANCOMYCIN HYDROCHLORIDE 1500 MG: 10 INJECTION, POWDER, LYOPHILIZED, FOR SOLUTION INTRAVENOUS at 09:26

## 2020-07-17 RX ADMIN — VANCOMYCIN HYDROCHLORIDE 1500 MG: 10 INJECTION, POWDER, LYOPHILIZED, FOR SOLUTION INTRAVENOUS at 21:11

## 2020-07-17 RX ADMIN — FUROSEMIDE 40 MG: 10 INJECTION, SOLUTION INTRAMUSCULAR; INTRAVENOUS at 09:22

## 2020-07-17 RX ADMIN — TRAZODONE HYDROCHLORIDE 100 MG: 50 TABLET ORAL at 21:25

## 2020-07-17 RX ADMIN — HYDROCODONE BITARTRATE AND ACETAMINOPHEN 1 TABLET: 5; 325 TABLET ORAL at 07:44

## 2020-07-17 RX ADMIN — OXYCODONE 5 MG: 5 TABLET ORAL at 00:07

## 2020-07-17 NOTE — PROGRESS NOTES
Problem: Mobility Impaired (Adult and Pediatric)  Goal: *Acute Goals and Plan of Care (Insert Text)  Description: GOALS (1-4 days):  (1.)Mr. Keith Richardson will move from supine to sit and sit to supine  in bed with MINIMAL ASSIST.  (2.)Mr. Keith Richardson will transfer from bed to chair and chair to bed with MINIMAL ASSIST using the least restrictive device. (3.)Mr. Keith Richardson will ambulate with MINIMAL ASSIST for 25 feet with the least restrictive device. (4)Mr. Keith Richardson will increase right knee ROM to 5°-80° and perform HEP with cues and assistance. .  ________________________________________________________________________________________________      PHYSICAL THERAPY: Initial Assessment and PM 7/17/2020  INPATIENT: PT Visit Days : 1  Payor: Benito Gutiérrez OF [place]ICARE / Plan: Phil Sullivan OF [place] MED[place]/PPO / Product Type: Managed Care Medicare /       NAME/AGE/GENDER: Idania Taylor is a 80 y.o. male   PRIMARY DIAGNOSIS: Sepsis ([facility] [A41.9]  Atrial fibrillation with RVR ([facility] [I48.91] Sepsis ([facility] Sepsis ([facility]        ICD-10: Treatment Diagnosis:    Generalized Muscle Weakness (M62.81)  Other abnormalities of gait and mobility (R26.89)   Precaution/Allergies:  Patient has no known allergies. ASSESSMENT:     Mr. Keith Richardson presents with significant weakness and debility. He had a right tka 7/13/20 and was discharged home the next day. He came back to the ER 7/15 with above diagnosis. Pt. Was drowsy throughout session but did arouse easily. He sat on the side of bed and stood with RW all with assistance. He was only able to take a couple of short steps with walker and assistance. He was quite weak and fatigued with activity. He did some right tka exercises. He was resting his knee in -21 degrees of extension in the bed. We discussed importance of keeping knee straight. He was unable to move his right LE much. Left supine with cold pack and knee working towards extension. He was much weaker than the day after his right tka surgery. This section established at most recent assessment   PROBLEM LIST (Impairments causing functional limitations):  Decreased Strength  Decreased ADL/Functional Activities  Decreased Transfer Abilities  Decreased Ambulation Ability/Technique  Decreased Balance  Increased Pain  Decreased Activity Tolerance  Increased Fatigue  Decreased Flexibility/Joint Mobility  Edema/Girth  Decreased [place] with Home Exercise Program   INTERVENTIONS PLANNED: (Benefits and precautions of physical therapy have been discussed with the patient.)  Balance Exercise  Bed Mobility  Family Education  Gait Training  Home Exercise Program (HEP)  Range of Motion (ROM)  Therapeutic Activites  Therapeutic Exercise/Strengthening  Transfer Training     TREATMENT PLAN: Frequency/Duration: daily for duration of hospital stay  Rehabilitation Potential For Stated Goals: [facility] (at time of discharge pending progress):    Placement:  rehab  Equipment:   None at this time              HISTORY:   History of Present Injury/Illness (Reason for Referral):  Mr. Mirella Cabezas is a nice 79 y/o WM sent to the ER from home on 7/15 for confusion. 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 Yesterday a friend thought he was confused and dizzy and sent him to the ER. He was in rapid AFib and met sepsis criteria. CXR negative. Cultures collected. He was started on antibiotics and a Cardizem drip and admitted. Ortho consulted. 7/17: O2 from 2L to 4L. Afebrile overnight. WBCs up a little. He is asking if he can go home today. Overnight nurse changed knee bandage, I reviewed a picture that was taken of the surgical site and there was one circular area of erythema just distal to the knee, lateral side of incision. Had to get Cardizem bolus overnight, back in NSR this morning. Bps good.   Past Medical History/Comorbidities:   Mr. Mirella Cabezas  has a past medical history of Acute lumbar radiculopathy, Arthritis, Ascending aorta dilatation [facility]), Atrial fibrillation with RVR ([facility] Enlarged prostate, Martinez catheter in place, GERD (gastroesophageal reflux disease), High cholesterol, Hypertension, ICH (intracerebral hemorrhage) ([facility] [facility] Lumbar stenosis with neurogenic claudication, Other forms of scoliosis, lumbar region, Poor historian, Psychiatric disorder, Seizures ([facility] Tongue lesion, and Urinary frequency. Mr. Yuniel Mack  has a past surgical history that includes hx other surgical; hx turp (10/20/11); hx heent (8/2012); hx orthopaedic (Right); and hx other surgical.  Social History/Living Environment:   Home Environment: Private residence  # Steps to Enter: 1(4 ft ramp for dog)  One/Two Story Residence: One story  Living Alone: Yes  Support Systems: Family member(s), Friends \ neighbors, Home care staff  Patient Expects to be Discharged to[de-identified] Private residence  Current DME Used/Available at Home: Alberta Dung, rollator  Prior Level of Function/Work/Activity:  Using RW after tka     Number of Personal Factors/Comorbidities that affect the Plan of Care: 3+: HIGH COMPLEXITY   EXAMINATION:   Most Recent Physical Functioning:   Gross Assessment:  AROM: Generally decreased, functional(except right LE)  Strength: Generally decreased, functional(except right LE)      RLE AROM  R Knee Flexion: 64(aarom)  R Knee Extension: 21        Posture:  Posture (WDL): Exceptions to WDL(neck flexed)  Posture Assessment: Decreased, Rounded shoulders, Forward head, Other (comment)  Balance:  Sitting: Intact; High guard  Standing: With support;Pull to stand Bed Mobility:  Supine to Sit: Moderate assistance  Sit to Supine: Maximum assistance  Scooting: Maximum assistance  Wheelchair Mobility:     Transfers:  Sit to Stand: Assist x2; Moderate assistance  Stand to Sit: Moderate assistance;Assist x2  Duration: 10 Minutes  Gait:  Right Side Weight Bearing: As tolerated  Speed/Elena: Delayed  Step Length: Left shortened;Right shortened  Stance: Right decreased  Gait Abnormalities: Antalgic;Decreased step clearance;Shuffling gait  Distance (ft): 3 Feet (ft)  Assistive Device: Walker, rolling  Ambulation - Level of Assistance: Additional time;Assist x2;Minimal assistance; Moderate assistance  Interventions: Manual cues; Safety awareness training; Tactile cues; Verbal cues      Body Structures Involved:  Bones  Joints  Muscles  Ligaments Body Functions Affected: Movement Related Activities and Participation Affected: Mobility   Number of elements that affect the Plan of Care: 3: MODERATE COMPLEXITY   CLINICAL PRESENTATION:   Presentation: Stable and uncomplicated: LOW COMPLEXITY   CLINICAL DECISION MAKIN[address] [place] [address] AM-PAC 6 Clicks   Basic Mobility Inpatient Short Form  How much difficulty does the patient currently have. .. Unable A Lot A Little None   1. Turning over in bed (including adjusting bedclothes, sheets and blankets)? [] 1   [x] 2   [] 3   [] 4   2. Sitting down on and standing up from a chair with arms ( e.g., wheelchair, bedside commode, etc.)   [] 1   [x] 2   [] 3   [] 4   3. Moving from lying on back to sitting on the side of the bed? [] 1   [x] 2   [] 3   [] 4   How much help from another person does the patient currently need. .. Total A Lot A Little None   4. Moving to and from a bed to a chair (including a wheelchair)? [] 1   [x] 2   [] 3   [] 4   5. Need to walk in hospital room? [x] 1   [] 2   [] 3   [] 4   6. Climbing 3-5 steps with a railing? [x] 1   [] 2   [] 3   [] 4   © , Trustees of [address] [place] [address], under license to [employer]. All rights reserved      Score:  Initial: 10 Most Recent: X (Date: -- )    Interpretation of Tool:  Represents activities that are increasingly more difficult (i.e. Bed mobility, Transfers, Gait). Medical Necessity:     Patient is expected to demonstrate progress in   strength, range of motion, and balance   to   decrease assistance required with exercises and functional mobility  .   Reason for Services/Other Comments:  Patient continues to require present interventions due to patient's inability to perform exercises and functional mobility independently  . Use of outcome tool(s) and clinical judgement create a POC that gives a: Questionable prediction of patient's progress: MODERATE COMPLEXITY            TREATMENT:   (In addition to Assessment/Re-Assessment sessions the following treatments were rendered)   Pre-treatment Symptoms/Complaints:  knee pain, weakness  Pain: Initial:      Post Session:  8   assessment  Therapeutic Activity: (  10 Minutes ):  Therapeutic activities including Bed transfers, Ambulation on level ground, and standing with walker to improve mobility, strength, and balance. Required moderate Manual cues; Safety awareness training; Tactile cues; Verbal cues to promote static and dynamic balance in standing. Therapeutic Exercise: (10 Minutes):  Exercises per grid below to improve mobility and strength. Required moderate visual, verbal, and manual cues to promote proper body alignment, promote proper body posture, and promote proper body mechanics. Progressed range and repetitions as indicated.      Date:  7/17 Date:   Date:     ACTIVITY/EXERCISE AM PM AM PM AM PM   GROUP THERAPY  []  []  []  []  []  []   Ankle Pumps  15a       Quad Sets  15aa       Gluteal Sets  15a       Hip ABd/ADduction  15aa       Straight Leg Raises         Knee Slides  15aa       Short Arc Quads         Long Arc Quads         Chair Slides                  B = bilateral; AA = active assistive; A = active; P = passive       Braces/Orthotics/Lines/Etc:   IV  arnett catheter  ICU lines   O2 Device: Nasal [id number]  Treatment/Session Assessment:    Response to Treatment:  weak and debility, nurse did not want him in chair  Interdisciplinary Collaboration:   Physical Therapist  Physical Therapy Assistant  Registered Nurse  After treatment position/precautions:   Supine in bed  Bed/Chair-wheels locked  Bed in low position  Call light within reach  RN notified   Compliance with Program/Exercises: Will assess as treatment progresses  Recommendations/Intent for next treatment session: \"Next visit will focus on advancements to more challenging activities and reduction in assistance provided\".   Total Treatment Duration:  PT Patient Time In/Time Out  Time In: 1500  Time Out: MIKE Eastman

## 2020-07-17 NOTE — PROGRESS NOTES
Shift Assessment:  Pt very anxious, restless and states \" I wanna get outta here, how long do I have to stay? Pt wanting to get oob, pt educated on Afib with rvr, decreased O2 sat with dyspnea on exertion and pending PT/OT evaluation s/p R tka 3 day prior, pt c/o pain rates as 7/10, pt given, po ativan for anxiety and po oxydocone for pain. 's, afib, sbp 130's, all pulses palpable, 2+ pitting edema noted to BLE. Resp: lungs auscultated with fine crackles to LLL, pt continues on 2 L NC with O2 sat 96% at this time, pt noted with dypsnea with any movement. Bowel tones active, flatus witnessed at bedside, no BM witnessed at this time. Suprapubic catheter in place draining clear yellow urine, site covered with dressing c/d/i, no issues noted. Skin noted with surgical incision to Right knee covered, old drainage to dressing site marked, no odor noted. PIV's patent and intact. Pt positioned for comfort and call light placed within reach.

## 2020-07-17 NOTE — PROGRESS NOTES
Pharmacy Note:    The patient's renal function has improved allowing a change the frequency from q18h to q12h. We will continue to follow the patient and adjust the dose as necessary per guidelines.     Lab Results   Component Value Date/Time    Creatinine 0.88 07/17/2020 04:00 AM     Thank you,  Ede Alvares, PharmD

## 2020-07-17 NOTE — PROGRESS NOTES
Cardiac:  Pt HR sustained in 150's, pt encouraged to cough without effect, pt encouraged to bear down without effect, Dr. Bjorn Brittle called and order received to given loading IV dose Cardizem and then restart gtt titrated for HR < 100. Loading dose of cardizem given with reduction in HR down to 80's, gtt on hold at this time. Order changed for PO ativan to IV ativan to reduce patient anxiety. Pt continues to want to move oob to either commode or chair at bedside, but HR variable 120-130's, O2 increased from 2 L NC to 4 L NC to maintain O 2 at > 92%.

## 2020-07-17 NOTE — PROGRESS NOTES
TRANSFER - OUT REPORT:    Verbal report given to [facility] RN(name) on Jose Osei  being transferred to [facility](unit) for routine progression of care       Report consisted of patients Situation, Background, Assessment and   Recommendations(SBAR). Information from the following report(s) SBAR, Kardex and MAR was reviewed with the receiving nurse. Lines:   Peripheral IV 07/15/20 Right Antecubital (Active)   Site Assessment Clean, dry, & intact 07/17/20 0730   Phlebitis Assessment 0 07/17/20 0730   Infiltration Assessment 0 07/17/20 0730   Dressing Status Clean, dry, & intact 07/17/20 0730   Dressing Type Transparent;Tape 07/17/20 0606   Hub Color/Line Status Pink 07/17/20 0606   Action Taken Blood drawn 07/15/20 2246       Peripheral IV 07/15/20 Right Hand (Active)   Site Assessment Clean, dry, & intact 07/17/20 0730   Phlebitis Assessment 0 07/17/20 0730   Infiltration Assessment 0 07/17/20 0730   Dressing Status Clean, dry, & intact 07/17/20 0730   Dressing Type Tape;Transparent 07/17/20 0730   Hub Color/Line Status Pink 07/17/20 0606   Action Taken Blood drawn 07/15/20 2333        Opportunity for questions and clarification was provided.       Patient transported with:   "[employer]"

## 2020-07-17 NOTE — PROGRESS NOTES
Cardiac:  Pt HR maintained 's, given Cozaar and Toprol XL PO to maintain HR and BP, gtt continues to be held and on standby in room if needed.

## 2020-07-17 NOTE — PROGRESS NOTES
Bedside, Verbal and Written shift change report given to Candido Pascual (oncoming nurse) by Desiree García (offgoing nurse). Report included the following information SBAR, MAR and Cardiac Rhythm Afib.

## 2020-07-17 NOTE — PROGRESS NOTES
Hospitalist Note     Admit Date:  7/15/2020 10:37 PM   Name:  Jens Sim   Age:  80 y.o.  :  1934   MRN:  [id number]   PCP:  Shabnam Lloyd MD  Treatment Team: Attending Provider: Jackie Bhandari MD; Utilization Review: Abimbola Beach RN; Care Manager: Maurice Roque LMSW; Primary Nurse: Yoel Jenkins RN    HPI/Subjective:   Mr. Cecilio Hoang is a nice 79 y/o WM sent to the ER from home on 7/15 for confusion. He recently underwent right TKA on . Yesterday a friend thought he was confused and dizzy and sent him to the ER. He was in rapid AFib and met sepsis criteria. CXR negative. Cultures collected. He was started on antibiotics and a Cardizem drip and admitted. Ortho consulted. : O2 from 2L to 4L. Afebrile overnight. WBCs up a little. He is asking if he can go home today. Overnight nurse changed knee bandage, I reviewed a picture that was taken of the surgical site and there was one circular area of erythema just distal to the knee, lateral side of incision. Had to get Cardizem bolus overnight, back in NSR this morning. Bps good.     No other complaints  Objective:     Patient Vitals for the past 24 hrs:   Temp Pulse Resp BP SpO2   20 0900  82 20 107/57 95 %   20 0800  84 15 119/60 98 %   20 0741 97.6 °F (36.4 °C)       20 0730     96 %   20 0700 97.6 °F (36.4 °C) 78 20 120/61 97 %   20 0600  77 22 112/65 97 %   20 0503  82 17 122/68 96 %   20 0400 98.3 °F (36.8 °C) 86 29 131/74 98 %   20 0300  86 (!) 36 130/69 96 %   20 0200  91 (!) 50 136/68 96 %   20 0100  93 30 144/65 97 %   20 0000    119/65    20 2359  89 22  96 %   20 2300  81 24 110/72 96 %   20 2207  (!) 112  140/67    20 2200  (!) 117 (!) 38 140/67 95 %   20 2100  87 (!) 42 134/79 95 %   205  (!) 130  138/75    20  (!) 159 30  98 %   20 98.3 °F (36.8 °C) (!) 118 (!) 36 138/75 96 %   07/16/20 1953  (!) 137 30  96 %   07/16/20 1941  (!) 138 (!) 31  95 %   07/16/20 1900  (!) 101 (!) 31 133/68 97 %   07/16/20 1700  98 23 119/69 97 %   07/16/20 1600  98 22 108/69 96 %   07/16/20 1500  88 24 109/70 95 %   07/16/20 1400  84 (!) 40 104/65 94 %   07/16/20 1300  93 (!) 32 102/55 96 %   07/16/20 1251  (!) 115  119/65    07/16/20 1210  83 20 119/65 94 %     Oxygen Therapy  O2 Sat (%): 95 % (07/17/20 0900)  Pulse via Oximetry: 83 beats per minute (07/17/20 0900)  O2 Device: Nasal cannula (07/17/20 0730)  O2 Flow Rate (L/min): 4 l/min (07/17/20 0730)    Estimated body mass index is 32.29 kg/m² as calculated from the following:    Height as of this encounter: 6' 1\" (1.854 m). Weight as of this encounter: 111 kg (244 lb 11.4 oz). Intake/Output Summary (Last 24 hours) at 7/17/2020 1129  Last data filed at 7/17/2020 2029  Gross per 24 hour   Intake 4075.4 ml   Output 3000 ml   Net 1075.4 ml       *Note that automatically entered I/Os may not be accurate; dependent on patient compliance with collection and accurate  by [employer] General:    Well nourished. Alert. CV:   RRR. No murmur, rub, or gallop. Lungs:   BB rales, O2 4L NC, NAD. Abdomen:   Soft, nontender, nondistended. Suprapubic catheter, site still with mild erythema and mild purulence. Abdominal wall is not involved. Urine is dark yellow. No pain to palpation. Extremities: Warm and dry. No cyanosis or edema. RLE warm to touch, more than left. Slightly more edema R>L. Knee is swollen but not red. Skin:     No rashes or jaundice. Neuro:  No gross focal deficits    Data Reviewed:  I have reviewed all labs, meds, and studies from the last 24 hours:  Recent Results (from the past 24 hour(s))   CULTURE, WOUND Esther Jesus STAIN    Collection Time: 07/16/20 12:13 PM    Specimen: Skin;  Wound   Result Value Ref Range    Special Requests: SUPRAPUBIC     GRAM STAIN PENDING     Culture result: NO GROWTH AFTER SHORT PERIOD OF INCUBATION. FURTHER RESULTS TO FOLLOW AFTER OVERNIGHT INCUBATION. CBC WITH AUTOMATED DIFF    Collection Time: 07/17/20  4:00 AM   Result Value Ref Range    WBC 15.2 (H) 4.3 - 11.1 K/uL    RBC 2.84 (L) 4.23 - 5.6 M/uL    HGB 8.8 (L) 13.6 - 17.2 g/dL    HCT 28.5 (L) 41.1 - 50.3 %    .4 (H) 79.6 - 97.8 FL    MCH 31.0 26.1 - 32.9 PG    MCHC 30.9 (L) 31.4 - 35.0 g/dL    RDW 14.0 11.9 - 14.6 %    PLATELET 102 105 - 308 K/uL    MPV 9.4 9.4 - 12.3 FL    ABSOLUTE NRBC 0.00 0.0 - 0.2 K/uL    DF AUTOMATED      NEUTROPHILS 75 43 - 78 %    LYMPHOCYTES 12 (L) 13 - 44 %    MONOCYTES 11 4.0 - 12.0 %    EOSINOPHILS 1 0.5 - 7.8 %    BASOPHILS 0 0.0 - 2.0 %    IMMATURE GRANULOCYTES 1 0.0 - 5.0 %    ABS. NEUTROPHILS 11.4 (H) 1.7 - 8.2 K/UL    ABS. LYMPHOCYTES 1.8 0.5 - 4.6 K/UL    ABS. MONOCYTES 1.7 (H) 0.1 - 1.3 K/UL    ABS. EOSINOPHILS 0.2 0.0 - 0.8 K/UL    ABS. BASOPHILS 0.1 0.0 - 0.2 K/UL    ABS. IMM.  GRANS. 0.1 0.0 - 0.5 K/UL   METABOLIC PANEL, BASIC    Collection Time: 07/17/20  4:00 AM   Result Value Ref Range    Sodium 135 (L) 136 - 145 mmol/L    Potassium 4.5 3.5 - 5.1 mmol/L    Chloride 107 98 - 107 mmol/L    CO2 24 21 - 32 mmol/L    Anion gap 4 (L) 7 - 16 mmol/L    Glucose 121 (H) 65 - 100 mg/dL    BUN 20 8 - 23 MG/DL    Creatinine 0.88 0.8 - 1.5 MG/DL    GFR est AA >60 >60 ml/min/1.73m2    GFR est non-AA >60 >60 ml/min/1.73m2    Calcium 7.8 (L) 8.3 - 10.4 MG/DL        Current Meds:  Current Facility-Administered Medications   Medication Dose Route Frequency    vancomycin (VANCOCIN) 1500 mg in  ml infusion  1,500 mg IntraVENous Q12H    furosemide (LASIX) injection 40 mg  40 mg IntraVENous DAILY    sodium chloride (NS) flush 5-40 mL  5-40 mL IntraVENous Q8H    sodium chloride (NS) flush 5-40 mL  5-40 mL IntraVENous PRN    acetaminophen (TYLENOL) tablet 650 mg  650 mg Oral Q4H PRN    HYDROcodone-acetaminophen (NORCO) 5-325 mg per tablet 1 Tab  1 Tab Oral Q4H PRN    naloxone (NARCAN) injection 0.4 mg  0.4 mg IntraVENous PRN    ondansetron (ZOFRAN) injection 4 mg  4 mg IntraVENous Q4H PRN    senna-docusate (PERICOLACE) 8.6-50 mg per tablet 2 Tab  2 Tab Oral DAILY PRN    LORazepam (ATIVAN) tablet 0.5 mg  0.5 mg Oral Q4H PRN    zolpidem (AMBIEN) tablet 5 mg  5 mg Oral QHS PRN    aspirin delayed-release tablet 81 mg  81 mg Oral BID    citalopram (CELEXA) tablet 20 mg  20 mg Oral DAILY    losartan (COZAAR) tablet 25 mg  25 mg Oral QHS    metoprolol succinate (TOPROL-XL) XL tablet 100 mg  100 mg Oral QHS    pantoprazole (PROTONIX) tablet 40 mg  40 mg Oral ACB    oxyCODONE IR (ROXICODONE) tablet 5 mg  5 mg Oral Q4H PRN    polyethylene glycol (MIRALAX) packet 17 g  17 g Oral DAILY    senna-docusate (PERICOLACE) 8.6-50 mg per tablet 1 Tab  1 Tab Oral DAILY    traZODone (DESYREL) tablet 100 mg  100 mg Oral QHS PRN    cefTRIAXone (ROCEPHIN) 2 g in 0.9% sodium chloride (MBP/ADV) 50 mL  2 g IntraVENous Q24H    metoprolol (LOPRESSOR) injection 5 mg  5 mg IntraVENous Q4H PRN    dilTIAZem (CARDIZEM) 100 mg in 0.9% sodium chloride (MBP/ADV) 100 mL infusion  10 mg/hr IntraVENous TITRATE    LORazepam (ATIVAN) injection 0.5 mg  0.5 mg IntraVENous Q4H PRN    [Held by provider] 0.9% sodium chloride infusion  150 mL/hr IntraVENous CONTINUOUS       Other Studies:  No results found for this visit on 07/15/20. Xr Knee Rt 3 V    Result Date: 7/16/2020  Portable right knee  7/16/2020 1:20 PM Indication: Patient immediately status post TKA Comparison: None available at this hospital PACS Findings: Portable AP, oblique and crosstable lateral views from 1246 are submitted. There is an anteriorly located vertically oriented skin staple line. There is soft tissues swelling and trace collections of air in the soft tissues and joints. No unexpected radiopaque foreign body. TKA prosthesis components appear in appropriate alignment with no acute complicating features. No bony fracture. Impression: Expected immediate postop TKA findings. No acute complicating features. All Micro Results     Procedure Component Value Units Date/Time    CULTURE, URINE [[id number]] Collected:  07/15/20 2321    Order Status:  Completed Specimen:  Urine Updated:  07/17/20 0816     Special Requests: NO SPECIAL REQUESTS        Culture result:       <10,000 COLONIES/mL MIXED SKIN MOE ISOLATED          CULTURE, Deacésaryoandy Kiana STAIN [[id number]] Collected:  07/16/20 1213    Order Status:  Completed Specimen:  Wound from Skin Updated:  07/17/20 0704     Special Requests: SUPRAPUBIC     GRAM STAIN PENDING     Culture result:       NO GROWTH AFTER SHORT PERIOD OF INCUBATION. FURTHER RESULTS TO FOLLOW AFTER OVERNIGHT INCUBATION.           CULTURE, BLOOD [[id number]] Collected:  07/15/20 2249    Order Status:  Completed Specimen:  Blood Updated:  07/16/20 0644     Special Requests: --        RIGHT  Antecubital       Culture result: NO GROWTH AFTER 7 HOURS       CULTURE, BLOOD [[id number]] Collected:  07/15/20 2334    Order Status:  Completed Specimen:  Blood Updated:  07/16/20 0644     Special Requests: --        RIGHT  HAND       Culture result: NO GROWTH AFTER 6 HOURS             SARS-CoV-2 Lab Results  \"Novel Coronavirus\" Test: No results found for: COV2NT   \"Emergent Disease\" Test: No results found for: EDPR  \"SARS-COV-2\" Test: No results found for: XGCOVT         Assessment and Plan:     Hospital Problems as of 7/17/2020 Date Reviewed: 7/13/2020          Codes Class Noted - Resolved POA    Atrial fibrillation with RVR ([facility] ICD-10-CM: I48.91  ICD-9-CM: 427.31  7/16/2020 - Present Yes        * (Principal) Sepsis ([facility] ICD-10-CM: A41.9  ICD-9-CM: 038.9, 995.91  7/16/2020 - Present Yes        Neurogenic bladder ICD-10-CM: N31.9  ICD-9-CM: 596.54  7/16/2020 - Present Yes        Hypertension ICD-10-CM: I10  ICD-9-CM: 401.9  Unknown - Present Yes        GERD (gastroesophageal reflux disease) ICD-10-CM: K21.9  ICD-9-CM: 530.81 Unknown - Present Yes    Overview Signed 3/15/2017  2:59 PM by Urvashi Head     controlled w/med                   Plan:  # Sepsis, urinary source vs right knee              - Sepsis resolved. WBCs improved. Afebrile. No in NSR.              - RLE certainly is warm swollen, mild erythema. Ortho consulted. On empiric vancomycin and ceftriaxone. Cultures are pending.               - Wound culture of catheter insertion site due to purulence and erythema. # AFib RVR              - 2/2 acute illness/sepsis              - Cardizem bolus overnight, NSR again. Con't PO BB.   - Would not anticoagulate now given post-op.     # Neurogenic bladder with suprapubic catheter              - Cath site with redness and drainage, mildly improved from yesterday. Culture NTD.     # HTN              - Home meds.     # GERD              - PPI     # MDD/anxiety              - Celexa; Ativan prn     DC planning/Dispo: Unclear, therapy today.   Diet:  DIET REGULAR  DVT ppx: SCDs only    Signed:  Khoi Thomas MD

## 2020-07-17 NOTE — PROGRESS NOTES
PT here and states patient is much weaker than when he was discharged. He got him to side of the bed and then due to decreasing strength and desaturation and oxygen requirements. Placed back in bed and positioned upright. Patient resting at this time.   o2sat recovered to mid 90's

## 2020-07-17 NOTE — PROGRESS NOTES
Resp: lungs auscultated with coarse crackles to LLL and fine crackles with expiratory wheezing with increased work of breathing noted with O2 sat maintained > 92% on 4L NC pt given ativan for anxiety and pain medication for right knee and back patient rates as 8/10, Dr, Ruby Boast called and orders received to d/c fluids.

## 2020-07-17 NOTE — PROGRESS NOTES
Family here to see patient. Patient is very sleepy but awakens to respond to family. Family ordered patients lunch and we discussed his behavior regarding home medication regimen. He has acted as if he may take more medication than what is prescribed. Because he was so frantic about getting his depression meds and that \"he just has to have them\". Niece was going to investigate further.

## 2020-07-17 NOTE — PROGRESS NOTES
End of shift note:  Pt currently resting comfortably, ativan for anxiety, effective, oxycodone for pain 8/10 given x3 this shift, effective. HR after cardizem bolus and bp meds, sustained at < 100 throughout remainder of shift, sbp 110-140's, all pulses palpable, 2+ pitting edema noted. Resp: lungs auscultated early this AM with crackles to L side and expiratory wheezing noted, pt continues on 4 L NC with O2 sat maintained > 92% since increase to 4 L. Bowel tones active, flatus witnessed at bedside and one smear of soft brown BM noted, full bed bath given. Suprapubic cath in place, catheter care provided, draining clear yellow urine. Skin noted to have small red nickel sized spot blanchable and warm to touch to right bottom third of right knee incision, old dressing removed, serosanguineous drainage noted to old dressing, site cleansed with CHG, pat dry and new aquacel dressing applied. PIV's patent and intact. Pt positioned for comfort and call light placed within reach.

## 2020-07-17 NOTE — PROGRESS NOTES
Patient complaining of pain in the right knee and asking for his depression meds and anxious. See mar for therapy.

## 2020-07-18 LAB
ANION GAP SERPL CALC-SCNC: 4 MMOL/L (ref 7–16)
BACTERIA SPEC CULT: NORMAL
BASOPHILS # BLD: 0 K/UL (ref 0–0.2)
BASOPHILS NFR BLD: 0 % (ref 0–2)
BUN SERPL-MCNC: 21 MG/DL (ref 8–23)
CALCIUM SERPL-MCNC: 8.3 MG/DL (ref 8.3–10.4)
CHLORIDE SERPL-SCNC: 105 MMOL/L (ref 98–107)
CO2 SERPL-SCNC: 27 MMOL/L (ref 21–32)
CREAT SERPL-MCNC: 1.02 MG/DL (ref 0.8–1.5)
DIFFERENTIAL METHOD BLD: ABNORMAL
EOSINOPHIL # BLD: 0.4 K/UL (ref 0–0.8)
EOSINOPHIL NFR BLD: 3 % (ref 0.5–7.8)
ERYTHROCYTE [DISTWIDTH] IN BLOOD BY AUTOMATED COUNT: 13.6 % (ref 11.9–14.6)
GLUCOSE SERPL-MCNC: 119 MG/DL (ref 65–100)
HCT VFR BLD AUTO: 28.6 % (ref 41.1–50.3)
HGB BLD-MCNC: 9 G/DL (ref 13.6–17.2)
IMM GRANULOCYTES # BLD AUTO: 0.1 K/UL (ref 0–0.5)
IMM GRANULOCYTES NFR BLD AUTO: 1 % (ref 0–5)
LYMPHOCYTES # BLD: 1.4 K/UL (ref 0.5–4.6)
LYMPHOCYTES NFR BLD: 11 % (ref 13–44)
MCH RBC QN AUTO: 31.4 PG (ref 26.1–32.9)
MCHC RBC AUTO-ENTMCNC: 31.5 G/DL (ref 31.4–35)
MCV RBC AUTO: 99.7 FL (ref 79.6–97.8)
MONOCYTES # BLD: 1.4 K/UL (ref 0.1–1.3)
MONOCYTES NFR BLD: 11 % (ref 4–12)
NEUTS SEG # BLD: 9.7 K/UL (ref 1.7–8.2)
NEUTS SEG NFR BLD: 74 % (ref 43–78)
NRBC # BLD: 0 K/UL (ref 0–0.2)
PLATELET # BLD AUTO: 252 K/UL (ref 150–450)
PMV BLD AUTO: 9.1 FL (ref 9.4–12.3)
POTASSIUM SERPL-SCNC: 4 MMOL/L (ref 3.5–5.1)
RBC # BLD AUTO: 2.87 M/UL (ref 4.23–5.6)
SERVICE CMNT-IMP: NORMAL
SODIUM SERPL-SCNC: 136 MMOL/L (ref 136–145)
WBC # BLD AUTO: 13 K/UL (ref 4.3–11.1)

## 2020-07-18 PROCEDURE — 36415 COLL VENOUS BLD VENIPUNCTURE: CPT

## 2020-07-18 PROCEDURE — 94760 N-INVAS EAR/PLS OXIMETRY 1: CPT

## 2020-07-18 PROCEDURE — 3331090001 HH PPS REVENUE CREDIT

## 2020-07-18 PROCEDURE — 3331090002 HH PPS REVENUE DEBIT

## 2020-07-18 PROCEDURE — 97530 THERAPEUTIC ACTIVITIES: CPT

## 2020-07-18 PROCEDURE — 77010033678 HC OXYGEN DAILY

## 2020-07-18 PROCEDURE — 74011250636 HC RX REV CODE- 250/636: Performed by: INTERNAL MEDICINE

## 2020-07-18 PROCEDURE — 74011250637 HC RX REV CODE- 250/637: Performed by: INTERNAL MEDICINE

## 2020-07-18 PROCEDURE — 85025 COMPLETE CBC W/AUTO DIFF WBC: CPT

## 2020-07-18 PROCEDURE — 65270000029 HC RM PRIVATE

## 2020-07-18 PROCEDURE — 80048 BASIC METABOLIC PNL TOTAL CA: CPT

## 2020-07-18 RX ORDER — CEPHALEXIN 500 MG/1
500 CAPSULE ORAL EVERY 6 HOURS
Status: DISCONTINUED | OUTPATIENT
Start: 2020-07-18 | End: 2020-07-20

## 2020-07-18 RX ORDER — FUROSEMIDE 10 MG/ML
40 INJECTION INTRAMUSCULAR; INTRAVENOUS 2 TIMES DAILY
Status: DISCONTINUED | OUTPATIENT
Start: 2020-07-18 | End: 2020-07-20

## 2020-07-18 RX ORDER — DOXYCYCLINE 100 MG/1
100 CAPSULE ORAL EVERY 12 HOURS
Status: DISCONTINUED | OUTPATIENT
Start: 2020-07-18 | End: 2020-07-22

## 2020-07-18 RX ADMIN — HYDROCODONE BITARTRATE AND ACETAMINOPHEN 1 TABLET: 5; 325 TABLET ORAL at 21:32

## 2020-07-18 RX ADMIN — TRAZODONE HYDROCHLORIDE 100 MG: 50 TABLET ORAL at 21:32

## 2020-07-18 RX ADMIN — HYDROCODONE BITARTRATE AND ACETAMINOPHEN 1 TABLET: 5; 325 TABLET ORAL at 08:31

## 2020-07-18 RX ADMIN — CEPHALEXIN 500 MG: 500 CAPSULE ORAL at 15:09

## 2020-07-18 RX ADMIN — FUROSEMIDE 40 MG: 10 INJECTION, SOLUTION INTRAMUSCULAR; INTRAVENOUS at 17:48

## 2020-07-18 RX ADMIN — PANTOPRAZOLE SODIUM 40 MG: 40 TABLET, DELAYED RELEASE ORAL at 06:00

## 2020-07-18 RX ADMIN — METOPROLOL SUCCINATE 100 MG: 50 TABLET, EXTENDED RELEASE ORAL at 21:32

## 2020-07-18 RX ADMIN — DOCUSATE SODIUM 50MG AND SENNOSIDES 8.6MG 1 TABLET: 8.6; 5 TABLET, FILM COATED ORAL at 08:31

## 2020-07-18 RX ADMIN — Medication 10 ML: at 06:00

## 2020-07-18 RX ADMIN — VANCOMYCIN HYDROCHLORIDE 1500 MG: 10 INJECTION, POWDER, LYOPHILIZED, FOR SOLUTION INTRAVENOUS at 08:34

## 2020-07-18 RX ADMIN — ASPIRIN 81 MG: 81 TABLET, COATED ORAL at 17:52

## 2020-07-18 RX ADMIN — DOXYCYCLINE HYCLATE 100 MG: 100 CAPSULE ORAL at 15:09

## 2020-07-18 RX ADMIN — OXYCODONE 5 MG: 5 TABLET ORAL at 05:59

## 2020-07-18 RX ADMIN — HYDROCODONE BITARTRATE AND ACETAMINOPHEN 1 TABLET: 5; 325 TABLET ORAL at 15:09

## 2020-07-18 RX ADMIN — POLYETHYLENE GLYCOL (3350) 17 G: 17 POWDER, FOR SOLUTION ORAL at 08:30

## 2020-07-18 RX ADMIN — ASPIRIN 81 MG: 81 TABLET, COATED ORAL at 08:31

## 2020-07-18 RX ADMIN — OXYCODONE 5 MG: 5 TABLET ORAL at 19:36

## 2020-07-18 RX ADMIN — ACETAMINOPHEN 650 MG: 325 TABLET, FILM COATED ORAL at 05:58

## 2020-07-18 RX ADMIN — FUROSEMIDE 40 MG: 10 INJECTION, SOLUTION INTRAMUSCULAR; INTRAVENOUS at 08:24

## 2020-07-18 RX ADMIN — OXYCODONE 5 MG: 5 TABLET ORAL at 01:24

## 2020-07-18 RX ADMIN — ACETAMINOPHEN 650 MG: 325 TABLET, FILM COATED ORAL at 01:24

## 2020-07-18 RX ADMIN — CITALOPRAM HYDROBROMIDE 20 MG: 20 TABLET ORAL at 08:31

## 2020-07-18 RX ADMIN — LOSARTAN POTASSIUM 25 MG: 25 TABLET ORAL at 21:33

## 2020-07-18 RX ADMIN — DOXYCYCLINE HYCLATE 100 MG: 100 CAPSULE ORAL at 21:32

## 2020-07-18 RX ADMIN — CEPHALEXIN 500 MG: 500 CAPSULE ORAL at 21:32

## 2020-07-18 RX ADMIN — LORAZEPAM 0.5 MG: 0.5 TABLET ORAL at 19:36

## 2020-07-18 NOTE — PROGRESS NOTES
Problem: Mobility Impaired (Adult and Pediatric)  Goal: *Acute Goals and Plan of Care (Insert Text)  Description: GOALS (1-4 days):  (1.)Mr. Sylvia Adame will move from supine to sit and sit to supine  in bed with MINIMAL ASSIST.  (2.)Mr. Sylvia Adame will transfer from bed to chair and chair to bed with MINIMAL ASSIST using the least restrictive device. (3.)Mr. Sylvia Adame will ambulate with MINIMAL ASSIST for 25 feet with the least restrictive device. (4)Mr. Sylvia Adame will increase right knee ROM to 5°-80° and perform HEP with cues and assistance. .  ________________________________________________________________________________________________      PHYSICAL THERAPY: Daily Note and PM 7/18/2020  INPATIENT: PT Visit Days : 2  Payor: [facility] MEDICARE / Plan: [facility] MEDICARE HMO/PPO / Product Type: Managed Care Medicare /       NAME/AGE/GENDER: Jorge Proctor is a 80 y.o. male   PRIMARY DIAGNOSIS: Sepsis ([facility] [A41.9]  Atrial fibrillation with RVR ([facility] [I48.91] Sepsis ([facility] Sepsis ([facility]       ICD-10: Treatment Diagnosis:    · Generalized Muscle Weakness (M62.81)  · Other abnormalities of gait and mobility (R26.89)   Precaution/Allergies:  Patient has no known allergies. ASSESSMENT:     Mr. Sylvia Adame presents with significant weakness and debility. He had a right tka 7/13/20 and was discharged home the next day. He came back to the ER 7/15 with above diagnosis. Pt. Was drowsy throughout session but did arouse easily. He sat on the side of bed and stood with RW all with assistance. He was only able to take a couple of short steps with walker and assistance. He was quite weak and fatigued with activity. He did some right tka exercises. He was resting his knee in -21 degrees of extension in the bed. We discussed importance of keeping knee straight. He was unable to move his right LE much. Left supine with cold pack and knee working towards extension. He was much weaker than the day after his right tka surgery. Sitting up in chair on contact. Mod assist x 2 for transfers and min assist x 2 for taking steps over to the bed. Positioned in supine with right heel on a towel roll to promote extension. Earlier today patient noted to have pillow under his knee. Instructed him not to do this. Social work spoke with patient, who refused going to rehab and says he will hire someone to be with him at home. Recommend HHPT and HHOT at discharge. This section established at most recent assessment   PROBLEM LIST (Impairments causing functional limitations):  1. Decreased Strength  2. Decreased ADL/Functional Activities  3. Decreased Transfer Abilities  4. Decreased Ambulation Ability/Technique  5. Decreased Balance  6. Increased Pain  7. Decreased Activity Tolerance  8. Increased Fatigue  9. Decreased Flexibility/Joint Mobility  10. Edema/Girth  11. Decreased Marvell with Home Exercise Program   INTERVENTIONS PLANNED: (Benefits and precautions of physical therapy have been discussed with the patient.)  1. Balance Exercise  2. Bed Mobility  3. Family Education  4. Gait Training  5. Home Exercise Program (HEP)  6. Range of Motion (ROM)  7. Therapeutic Activites  8. Therapeutic Exercise/Strengthening  9. Transfer Training     TREATMENT PLAN: Frequency/Duration: daily for duration of hospital stay  Rehabilitation Potential For Stated Goals: [facility] (at time of discharge pending progress):    Placement:  rehab  Equipment:    None at this time              HISTORY:   History of Present Injury/Illness (Reason for Referral):  Mr. Fiorella Barakat is a nice 81 y/o WM sent to the ER from home on 7/15 for confusion. 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 Yesterday a friend thought he was confused and dizzy and sent him to the ER. He was in rapid AFib and met sepsis criteria. CXR negative. Cultures collected. He was started on antibiotics and a Cardizem drip and admitted. Ortho consulted. 7/17: O2 from 2L to 4L.  Afebrile overnight. WBCs up a little. He is asking if he can go home today. Overnight nurse changed knee bandage, I reviewed a picture that was taken of the surgical site and there was one circular area of erythema just distal to the knee, lateral side of incision. Had to get Cardizem bolus overnight, back in NSR this morning. Bps good. Past Medical History/Comorbidities:   Mr. George Collier  has a past medical history of Acute lumbar radiculopathy, Arthritis, Ascending aorta dilatation ([address] Atrial fibrillation with RVR ([address] Enlarged prostate, Martinez catheter in place, GERD (gastroesophageal reflux disease), High cholesterol, Hypertension, ICH (intracerebral hemorrhage) ([address] Lumbar stenosis with neurogenic claudication, Other forms of scoliosis, lumbar region, Poor historian, Psychiatric disorder, Seizures ([address] Tongue lesion, and Urinary frequency. Mr. George Collier  has a past surgical history that includes hx other surgical; hx turp (10/20/11); hx heent (8/2012); hx orthopaedic (Right); and hx other surgical.  Social History/Living Environment:   Home Environment: Private residence  # Steps to Enter: 1(4 ft ramp for dog)  One/Two Story Residence: One story  Living Alone: Yes  Support Systems: Family member(s), Friends \ neighbors, Home care staff  Patient Expects to be Discharged to[de-identified] Private residence  Current DME Used/Available at Home: karthikeyan Mao  Prior Level of Function/Work/Activity:  Using RW after tka     Number of Personal Factors/Comorbidities that affect the Plan of Care: 3+: HIGH COMPLEXITY   EXAMINATION:   Most Recent Physical Functioning:   Gross Assessment:                  Posture:     Balance:  Sitting: Intact  Standing: Pull to stand; With support  Standing - Static: Fair  Standing - Dynamic : Poor Bed Mobility:  Supine to Sit: Moderate assistance  Sit to Supine: Moderate assistance;Assist x2  Scooting: Moderate assistance  Wheelchair Mobility:     Transfers:  Sit to Stand:  Moderate assistance;Assist x2  Stand to Sit: Minimum assistance;Assist x2  Bed to Chair: Moderate assistance;Assist x2  Duration: 20 Minutes  Gait:  Right Side Weight Bearing: As tolerated  Speed/Elena: Slow  Step Length: Right shortened;Left shortened  Stance: Right decreased  Gait Abnormalities: Antalgic;Decreased step clearance; Step to gait;Trunk sway increased  Distance (ft): 5 Feet (ft)  Assistive Device: Walker, rolling  Ambulation - Level of Assistance: Minimal assistance;Assist x2  Interventions: Verbal cues; Safety awareness training      Body Structures Involved:  1. Bones  2. Joints  3. Muscles  4. Ligaments Body Functions Affected:  1. Movement Related Activities and Participation Affected:  1. Mobility   Number of elements that affect the Plan of Care: 3: MODERATE COMPLEXITY   CLINICAL PRESENTATION:   Presentation: Stable and uncomplicated: LOW COMPLEXITY   CLINICAL DECISION MAKIN[address] [place] [id number] AM-PAC 6 Clicks   Basic Mobility Inpatient Short Form  How much difficulty does the patient currently have. .. Unable A Lot A Little None   1. Turning over in bed (including adjusting bedclothes, sheets and blankets)? [] 1   [x] 2   [] 3   [] 4   2. Sitting down on and standing up from a chair with arms ( e.g., wheelchair, bedside commode, etc.)   [] 1   [x] 2   [] 3   [] 4   3. Moving from lying on back to sitting on the side of the bed? [] 1   [x] 2   [] 3   [] 4   How much help from another person does the patient currently need. .. Total A Lot A Little None   4. Moving to and from a bed to a chair (including a wheelchair)? [] 1   [x] 2   [] 3   [] 4   5. Need to walk in hospital room? [x] 1   [] 2   [] 3   [] 4   6. Climbing 3-5 steps with a railing? [x] 1   [] 2   [] 3   [] 4   © , Trustees of [address] [place] [id number], under license to [employer].  All rights reserved      Score:  Initial: 10 Most Recent: X (Date: -- )    Interpretation of Tool:  Represents activities that are increasingly more difficult (i.e. Bed mobility, Transfers, Gait). Medical Necessity:     · Patient is expected to demonstrate progress in   · strength, range of motion, and balance  ·  to   · decrease assistance required with exercises and functional mobility  · . Reason for Services/Other Comments:  · Patient   · continues to require present interventions due to patient's inability to perform exercises and functional mobility independently  · . Use of outcome tool(s) and clinical judgement create a POC that gives a: Questionable prediction of patient's progress: MODERATE COMPLEXITY            TREATMENT:   (In addition to Assessment/Re-Assessment sessions the following treatments were rendered)   Pre-treatment Symptoms/Complaints:  knee pain, weakness  Pain: Initial:      Post Session:  8   assessment  Therapeutic Activity: (  20 Minutes ):  Therapeutic activities including Bed transfers, Ambulation on level ground, and standing with walker to improve mobility, strength, and balance. Required moderate Verbal cues; Safety awareness training to promote static and dynamic balance in standing. Date:  7/17 Date:   Date:     ACTIVITY/EXERCISE AM PM AM PM AM PM   GROUP THERAPY  []  []  []  []  []  []   Ankle Pumps  15a       Quad Sets  15aa       Gluteal Sets  15a       Hip ABd/ADduction  15aa       Straight Leg Raises         Knee Slides  15aa       Short Arc Quads         Long Arc Quads         Chair Slides                  B = bilateral; AA = active assistive; A = active; P = passive       Braces/Orthotics/Lines/Etc:   · IV  · arnett catheter  · O2 Device: Nasal [id number]  Treatment/Session Assessment:    · Response to Treatment:  Weak but willing to participate. Gets very short of breath with activity. Has to stay on his oxygen with mobility.  Asking how long it will take for his leg to get better  · Interdisciplinary Collaboration:   o Physical Therapist  o Registered Nurse  · After treatment position/precautions:   o Up in chair  o Bed/Chair-wheels locked  o Bed in low position  o Call light within reach  o RN notified  o Family at bedside   · Compliance with Program/Exercises: Will assess as treatment progresses  · Recommendations/Intent for next treatment session: \"Next visit will focus on advancements to more challenging activities and reduction in assistance provided\".   Total Treatment Duration:  PT Patient Time In/Time Out  Time In: 1500  Time Out: 9[address] Farhan Elizabeth PT

## 2020-07-18 NOTE — PROGRESS NOTES
Shift assessment completed. Pt is alert & oriented x4. Able to verbalize needs. Resting quietly with no distress noted. Dressing to right knee is clean, dry and intact. Atul Carpen in place. Neurovascular and peripheral vascular checks WNL. Patient has suprapubic catheter. Pain is being managed with Oxycodone with patient tolerating well. Patient has periodic confusion but is A&O x 4. Bed low and locked. Call light within reach. Patient instructed to call for assistance. Pt verbalizes understanding. Will monitor.

## 2020-07-18 NOTE — PROGRESS NOTES
RN to patient room. Patient agitated and requesting psych consult stating \"I can't take it anymore. I'm going to kill myself\". Patient says he does not have a suicide plan but \"I need some help\". Patient is agitated and anxious. RN to administer Ativan at this time. NI message sent to Hospitalist.  VO received for consult.

## 2020-07-18 NOTE — PROGRESS NOTES
Patient resting in bed. PIV infusing antibiotics. Lung sounds diminished. 02 at 4L per NC. Abdomen soft with active bowel sounds. BLE 2+ edema. Lasix given. Alert and oriented x3. Patient denies any suicide plans. He states that he wants to feel better and not be in the hospital. Bed is low and locked. Call light within reach. Instructed to call for assistance. Verbalized understanding.

## 2020-07-18 NOTE — PROGRESS NOTES
Problem: Mobility Impaired (Adult and Pediatric)  Goal: *Acute Goals and Plan of Care (Insert Text)  Description: GOALS (1-4 days):  (1.)Mr. Mirella Cabezas will move from supine to sit and sit to supine  in bed with MINIMAL ASSIST.  (2.)Mr. Mirella Cabezas will transfer from bed to chair and chair to bed with MINIMAL ASSIST using the least restrictive device. (3.)Mr. Mirella Cabezas will ambulate with MINIMAL ASSIST for 25 feet with the least restrictive device. (4)Mr. Mirella Cabezas will increase right knee ROM to 5°-80° and perform HEP with cues and assistance. .  ________________________________________________________________________________________________      PHYSICAL THERAPY: Daily Note and AM 7/18/2020  INPATIENT: PT Visit Days : 2  Payor: [facility] MEDICARE / Plan: [employer] [place] MED[place]/PPO / Product Type: Managed Care Medicare /       NAME/AGE/GENDER: Ceiclle Giron is a 80 y.o. male   PRIMARY DIAGNOSIS: Sepsis ([facility] [address] [A41.9]  Atrial fibrillation with RVR ([facility] [address] [I48.91] Sepsis ([facility] [address] Sepsis ([facility] [address]       ICD-10: Treatment Diagnosis:    · Generalized Muscle Weakness (M62.81)  · Other abnormalities of gait and mobility (R26.89)   Precaution/Allergies:  Patient has no known allergies. ASSESSMENT:     Mr. Mirella Cabezas presents with significant weakness and debility. He had a right tka 7/13/20 and was discharged home the next day. He came back to the ER 7/15 with above diagnosis. Pt. Was drowsy throughout session but did arouse easily. He sat on the side of bed and stood with RW all with assistance. He was only able to take a couple of short steps with walker and assistance. He was quite weak and fatigued with activity. He did some right tka exercises. He was resting his knee in -21 degrees of extension in the bed. We discussed importance of keeping knee straight. He was unable to move his right LE much. Left supine with cold pack and knee working towards extension. He was much weaker than the day after his right tka surgery. Supine on contact. Participated well and willing to get up to the chair with PT. Needed cuing and mod assist for bed mobility and min assist for transfers. Will continue to progress mobility as tolerated. Calm and not anxious this morning. Not sure of discharge plan. Really needs rehab but not sure he would be willing    This section established at most recent assessment   PROBLEM LIST (Impairments causing functional limitations):  1. Decreased Strength  2. Decreased ADL/Functional Activities  3. Decreased Transfer Abilities  4. Decreased Ambulation Ability/Technique  5. Decreased Balance  6. Increased Pain  7. Decreased Activity Tolerance  8. Increased Fatigue  9. Decreased Flexibility/Joint Mobility  10. Edema/Girth  11. Decreased [place] with Home Exercise Program   INTERVENTIONS PLANNED: (Benefits and precautions of physical therapy have been discussed with the patient.)  1. Balance Exercise  2. Bed Mobility  3. Family Education  4. Gait Training  5. Home Exercise Program (HEP)  6. Range of Motion (ROM)  7. Therapeutic Activites  8. Therapeutic Exercise/Strengthening  9. Transfer Training     TREATMENT PLAN: Frequency/Duration: daily for duration of hospital stay  Rehabilitation Potential For Stated Goals: [address] [place] [address] (at time of discharge pending progress):    Placement:  rehab  Equipment:    None at this time              HISTORY:   History of Present Injury/Illness (Reason for Referral):  Mr. Yuniel Mack is a nice 79 y/o WM sent to the ER from home on 7/15 for confusion. 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 Yesterday a friend thought he was confused and dizzy and sent him to the ER. He was in rapid AFib and met sepsis criteria. CXR negative. Cultures collected. He was started on antibiotics and a Cardizem drip and admitted. Ortho consulted. 7/17: O2 from 2L to 4L. Afebrile overnight. WBCs up a little. He is asking if he can go home today.  Overnight nurse changed knee bandage, I reviewed a picture that was taken of the surgical site and there was one circular area of erythema just distal to the knee, lateral side of incision. Had to get Cardizem bolus overnight, back in NSR this morning. Bps good. Past Medical History/Comorbidities:   Mr. Nathaniel Kellogg  has a past medical history of Acute lumbar radiculopathy, Arthritis, Ascending aorta dilatation ([address] Atrial fibrillation with RVR ([address] Enlarged prostate, Martinez catheter in place, GERD (gastroesophageal reflux disease), High cholesterol, Hypertension, ICH (intracerebral hemorrhage) ([address] Lumbar stenosis with neurogenic claudication, Other forms of scoliosis, lumbar region, Poor historian, Psychiatric disorder, Seizures ([address] Tongue lesion, and Urinary frequency. Mr. Nathaniel Kellogg  has a past surgical history that includes hx other surgical; hx turp (10/20/11); hx heent (8/2012); hx orthopaedic (Right); and hx other surgical.  Social History/Living Environment:   Home Environment: Private residence  # Steps to Enter: 1(4 ft ramp for dog)  One/Two Story Residence: One story  Living Alone: Yes  Support Systems: Family member(s), Friends \ neighbors, Home care staff  Patient Expects to be Discharged to[de-identified] Private residence  Current DME Used/Available at Home: karthikeyan Potts  Prior Level of Function/Work/Activity:  Using RW after tka     Number of Personal Factors/Comorbidities that affect the Plan of Care: 3+: HIGH COMPLEXITY   EXAMINATION:   Most Recent Physical Functioning:   Gross Assessment:                  Posture:     Balance:  Sitting: Intact  Standing: Pull to stand; With support; Impaired  Standing - Static: Fair  Standing - Dynamic : Poor Bed Mobility:  Supine to Sit: Moderate assistance  Scooting: Moderate assistance  Wheelchair Mobility:     Transfers:  Sit to Stand:  Moderate assistance;Assist x2  Stand to Sit: Minimum assistance;Assist x2  Bed to Chair: Minimum assistance;Assist x2  Duration: 20 Minutes  Gait:  Right Side Weight Bearing: As tolerated  Speed/Elena: Slow  Step Length: Right shortened;Left shortened  Stance: Right decreased  Gait Abnormalities: Antalgic;Decreased step clearance;Shuffling gait; Step to gait  Distance (ft): 5 Feet (ft)(bed to chair)  Assistive Device: Walker, rolling  Ambulation - Level of Assistance: Minimal assistance;Assist x2; Additional time  Interventions: Verbal cues; Safety awareness training      Body Structures Involved:  1. Bones  2. Joints  3. Muscles  4. Ligaments Body Functions Affected:  1. Movement Related Activities and Participation Affected:  1. Mobility   Number of elements that affect the Plan of Care: 3: MODERATE COMPLEXITY   CLINICAL PRESENTATION:   Presentation: Stable and uncomplicated: LOW COMPLEXITY   CLINICAL DECISION MAKIN[address] [place] [id number] AM-PAC 6 Clicks   Basic Mobility Inpatient Short Form  How much difficulty does the patient currently have. .. Unable A Lot A Little None   1. Turning over in bed (including adjusting bedclothes, sheets and blankets)? [] 1   [x] 2   [] 3   [] 4   2. Sitting down on and standing up from a chair with arms ( e.g., wheelchair, bedside commode, etc.)   [] 1   [x] 2   [] 3   [] 4   3. Moving from lying on back to sitting on the side of the bed? [] 1   [x] 2   [] 3   [] 4   How much help from another person does the patient currently need. .. Total A Lot A Little None   4. Moving to and from a bed to a chair (including a wheelchair)? [] 1   [x] 2   [] 3   [] 4   5. Need to walk in hospital room? [x] 1   [] 2   [] 3   [] 4   6. Climbing 3-5 steps with a railing? [x] 1   [] 2   [] 3   [] 4   © , Trustees of [address] [place] [address] [address] [id number], under license to [employer]. All rights reserved      Score:  Initial: 10 Most Recent: X (Date: -- )    Interpretation of Tool:  Represents activities that are increasingly more difficult (i.e. Bed mobility, Transfers, Gait).     Medical Necessity:     · Patient is expected to demonstrate progress in   · strength, range of motion, and balance  ·  to   · decrease assistance required with exercises and functional mobility  · . Reason for Services/Other Comments:  · Patient   · continues to require present interventions due to patient's inability to perform exercises and functional mobility independently  · . Use of outcome tool(s) and clinical judgement create a POC that gives a: Questionable prediction of patient's progress: MODERATE COMPLEXITY            TREATMENT:   (In addition to Assessment/Re-Assessment sessions the following treatments were rendered)   Pre-treatment Symptoms/Complaints:  knee pain, weakness  Pain: Initial:      Post Session:  8   assessment  Therapeutic Activity: (  20 Minutes ):  Therapeutic activities including Bed transfers, Ambulation on level ground, and standing with walker to improve mobility, strength, and balance. Required moderate Verbal cues; Safety awareness training to promote static and dynamic balance in standing. Date:  7/17 Date:   Date:     ACTIVITY/EXERCISE AM PM AM PM AM PM   GROUP THERAPY  []  []  []  []  []  []   Ankle Pumps  15a       Quad Sets  15aa       Gluteal Sets  15a       Hip ABd/ADduction  15aa       Straight Leg Raises         Knee Slides  15aa       Short Arc Quads         Long Arc Quads         Chair Slides                  B = bilateral; AA = active assistive; A = active; P = passive       Braces/Orthotics/Lines/Etc:   · IV  · arnett catheter  · O2 Device: Nasal [id number]  Treatment/Session Assessment:    · Response to Treatment:  Weak but willing to participate. Gets very short of breath with activity. Has to stay on his oxygen with mobility. · Interdisciplinary Collaboration:   o Physical Therapist  o Registered Nurse  · After treatment position/precautions:   o Up in chair  o Bed/Chair-wheels locked  o Bed in low position  o Call light within reach  o RN notified  o Family at bedside   · Compliance with Program/Exercises:  Will assess as treatment progresses  · Recommendations/Intent for next treatment session: \"Next visit will focus on advancements to more challenging activities and reduction in assistance provided\".   Total Treatment Duration:  PT Patient Time In/Time Out  Time In: 1130  Time Out: 295 Kaz Rubio PT

## 2020-07-18 NOTE — PROGRESS NOTES
Care Management Interventions  PCP Verified by CM: Yes  Transition of Care Consult (CM Consult): [address]: Yes  Physical Therapy Consult: Yes  Occupational Therapy Consult: Yes  Current Support Network: Lives Alone  The Plan for Transition of Care is Related to the Following Treatment Goals : Increase independence  The Patient and/or Patient Representative was Provided with a Choice of Provider and Agrees with the Discharge Plan?: Yes  Freedom of Choice List was Provided with Basic Dialogue that Supports the Patient's Individualized Plan of Care/Goals, Treatment Preferences and Shares the Quality Data Associated with the Providers?: Yes  Discharge Location  Discharge Placement: Home with home health  80 M hx RTKA, lives at home alone. Here 7/13-7/14, home with [facility], readmit 7/15 with AMS. Suprapubic in place. PT/OT reports pt weak and fatigued with activity, needs assistance for ADLs. TelePsych notes pt denied suicide intentions. Spoke with patient and recommended rehab or 24/7 help at home. Pt refuses rehab and says he has a caregiver  Chris Mora, who he can hire. Spoke with gdtr Garrett Mobley ([phone]) and reviewed need for 24/7. She understands and will help patient make arrangements. Ordered [facility] to resume [facility] at discharge.

## 2020-07-18 NOTE — PROGRESS NOTES
Hospitalist Note     Admit Date:  7/15/2020 10:37 PM   Name:  Ignacia Nobles   Age:  [place] 3 y.o.  :  1934   MRN:  [id number]   PCP:  Rosario Bone MD  Treatment Team: Attending Provider: Paulina Ojeda MD; Utilization Review: Josie Frederick RN; Care Manager: Kobe Norman [place]; Physical Therapist: Jocelyn Smith PT; Care Manager: Alma Molina LMSW    HPI/Subjective:   Mr. Samia Meza is a nice [place] 3 y/o WM sent to the ER from home on 7/15 for confusion. He recently underwent right TKA on . Yesterday a friend thought he was confused and dizzy and sent him to the ER. He was in rapid AFib and met sepsis criteria. CXR negative. Cultures collected. He was started on antibiotics and a Cardizem drip and admitted. Ortho consulted. : Out of ICU. Apparently told nursing overnight he wanted to kill himself. Psych consult pending. Cultures negative. Good uop with Lasix, ~\4L recorded yesterday, 1L today; overal net neg almost 3L now. Still look weak, c/o RLE pain.     No other complaints  Objective:     Patient Vitals for the past 24 hrs:   Temp Pulse Resp BP SpO2   20 1116  86  100/53 98 %   20 0916     97 %   20 0727  77  112/66 97 %   20 0312 98.4 °F (36.9 °C) 93 16 153/76 91 %   20 0025 98.3 °F (36.8 °C) 91 16 155/78 92 %   20 1925 97.9 °F (36.6 °C) 92 26 177/72 92 %   20 1800  91 (!) 36 174/80 (!) 38 %   20 1700  90 (!) 44 173/79 (!) 81 %   20 1614 97.6 °F (36.4 °C)       20 1600  85 (!) 48 138/65 96 %   20 1500 98.2 °F (36.8 °C) 83 24 148/66 97 %   20 1400  88 21 122/65 96 %     Oxygen Therapy  O2 Sat (%): 98 % (20 1116)  Pulse via Oximetry: 81 beats per minute (20)  O2 Device: Nasal cannula(2.5L) (20)  O2 Flow Rate (L/min): 2.5 l/min(weaned from 4L) (20)    Estimated body mass index is 32.06 kg/m² as calculated from the following:    Height as of this encounter: 6' 1\" (1.854 m). Weight as of this encounter: 110.2 kg (243 lb). Intake/Output Summary (Last 24 hours) at 7/18/2020 1310  Last data filed at 7/18/2020 1116  Gross per 24 hour   Intake 1652 ml   Output 3575 ml   Net -1923 ml       *Note that automatically entered I/Os may not be accurate; dependent on patient compliance with collection and accurate  by techs. General:    Well nourished. Tired but awakens easily. CV:   RRR. No murmur, rub, or gallop. Lungs:   BB rales. 4L NC O2. Abdomen:   Soft, nontender, nondistended. Suprapubic cath site still with surrounding erythema and mild drainage; has not extended or progressed. :  Cath site as above. Yellow urine in bag. Extremities: Warm and dry. No cyanosis or edema. Skin:     No rashes or jaundice. Neuro:  No gross focal deficits    Data Reviewed:  I have reviewed all labs, meds, and studies from the last 24 hours:  Recent Results (from the past 24 hour(s))   CBC WITH AUTOMATED DIFF    Collection Time: 07/18/20  3:42 AM   Result Value Ref Range    WBC 13.0 (H) 4.3 - 11.1 K/uL    RBC 2.87 (L) 4.23 - 5.6 M/uL    HGB 9.0 (L) 13.6 - 17.2 g/dL    HCT 28.6 (L) 41.1 - 50.3 %    MCV 99.7 (H) 79.6 - 97.8 FL    MCH 31.4 26.1 - 32.9 PG    MCHC 31.5 31.4 - 35.0 g/dL    RDW 13.6 11.9 - 14.6 %    PLATELET 964 900 - 459 K/uL    MPV 9.1 (L) 9.4 - 12.3 FL    ABSOLUTE NRBC 0.00 0.0 - 0.2 K/uL    DF AUTOMATED      NEUTROPHILS 74 43 - 78 %    LYMPHOCYTES 11 (L) 13 - 44 %    MONOCYTES 11 4.0 - 12.0 %    EOSINOPHILS 3 0.5 - 7.8 %    BASOPHILS 0 0.0 - 2.0 %    IMMATURE GRANULOCYTES 1 0.0 - 5.0 %    ABS. NEUTROPHILS 9.7 (H) 1.7 - 8.2 K/UL    ABS. LYMPHOCYTES 1.4 0.5 - 4.6 K/UL    ABS. MONOCYTES 1.4 (H) 0.1 - 1.3 K/UL    ABS. EOSINOPHILS 0.4 0.0 - 0.8 K/UL    ABS. BASOPHILS 0.0 0.0 - 0.2 K/UL    ABS. IMM.  GRANS. 0.1 0.0 - 0.5 K/UL   METABOLIC PANEL, BASIC    Collection Time: 07/18/20  3:42 AM   Result Value Ref Range    Sodium 136 136 - 145 mmol/L    Potassium 4.0 3.5 - 5.1 mmol/L    Chloride 105 98 - 107 mmol/L    CO2 27 21 - 32 mmol/L    Anion gap 4 (L) 7 - 16 mmol/L    Glucose 119 (H) 65 - 100 mg/dL    BUN 21 8 - 23 MG/DL    Creatinine 1.02 0.8 - 1.5 MG/DL    GFR est AA >60 >60 ml/min/1.73m2    GFR est non-AA >60 >60 ml/min/1.73m2    Calcium 8.3 8.3 - 10.4 MG/DL        Current Meds:  Current Facility-Administered Medications   Medication Dose Route Frequency    furosemide (LASIX) injection 40 mg  40 mg IntraVENous BID    doxycycline (VIBRAMYCIN) capsule 100 mg  100 mg Oral Q12H    cephALEXin (KEFLEX) capsule 500 mg  500 mg Oral Q6H    sodium chloride (NS) flush 5-40 mL  5-40 mL IntraVENous Q8H    sodium chloride (NS) flush 5-40 mL  5-40 mL IntraVENous PRN    acetaminophen (TYLENOL) tablet 650 mg  650 mg Oral Q4H PRN    HYDROcodone-acetaminophen (NORCO) 5-325 mg per tablet 1 Tab  1 Tab Oral Q4H PRN    naloxone (NARCAN) injection 0.4 mg  0.4 mg IntraVENous PRN    ondansetron (ZOFRAN) injection 4 mg  4 mg IntraVENous Q4H PRN    senna-docusate (PERICOLACE) 8.6-50 mg per tablet 2 Tab  2 Tab Oral DAILY PRN    LORazepam (ATIVAN) tablet 0.5 mg  0.5 mg Oral Q4H PRN    zolpidem (AMBIEN) tablet 5 mg  5 mg Oral QHS PRN    aspirin delayed-release tablet 81 mg  81 mg Oral BID    citalopram (CELEXA) tablet 20 mg  20 mg Oral DAILY    losartan (COZAAR) tablet 25 mg  25 mg Oral QHS    metoprolol succinate (TOPROL-XL) XL tablet 100 mg  100 mg Oral QHS    pantoprazole (PROTONIX) tablet 40 mg  40 mg Oral ACB    oxyCODONE IR (ROXICODONE) tablet 5 mg  5 mg Oral Q4H PRN    polyethylene glycol (MIRALAX) packet 17 g  17 g Oral DAILY    senna-docusate (PERICOLACE) 8.6-50 mg per tablet 1 Tab  1 Tab Oral DAILY    traZODone (DESYREL) tablet 100 mg  100 mg Oral QHS PRN    metoprolol (LOPRESSOR) injection 5 mg  5 mg IntraVENous Q4H PRN       Other Studies:  No results found for this visit on 07/15/20. No results found.     All Micro Results     Procedure Component Value Units Date/Time    CULTURE, Inna Mass STAIN [[id number]] Collected:  07/16/20 1213    Order Status:  Completed Specimen:  Wound from Skin Updated:  07/18/20 0914     Special Requests: SUPRAPUBIC     GRAM STAIN 1 TO 5 WBCS SEEN PER OIF      NO DEFINITE ORGANISM SEEN        Culture result: NO GROWTH 1 DAY       CULTURE, BLOOD [[id number]] Collected:  07/15/20 2249    Order Status:  Completed Specimen:  Blood Updated:  07/18/20 0905     Special Requests: --        RIGHT  Antecubital       Culture result: NO GROWTH 3 DAYS       CULTURE, BLOOD [[id number]] Collected:  07/15/20 2334    Order Status:  Completed Specimen:  Blood Updated:  07/18/20 0905     Special Requests: --        RIGHT  HAND       Culture result: NO GROWTH 3 DAYS       CULTURE, URINE [[id number]] Collected:  07/15/20 2321    Order Status:  Completed Specimen:  Urine Updated:  07/18/20 0837     Special Requests: NO SPECIAL REQUESTS        Culture result:       <10,000 COLONIES/mL MIXED SKIN MOE ISOLATED                SARS-CoV-2 Lab Results  \"Novel Coronavirus\" Test: No results found for: COV2NT   \"Emergent Disease\" Test: No results found for: EDPR  \"SARS-COV-2\" Test: No results found for: XGCOVT  \"[facility] Labs\" Test: No results found for: RSLT         Assessment and Plan:     Hospital Problems as of 7/18/2020 Date Reviewed: 7/13/2020          Codes Class Noted - Resolved POA    Atrial fibrillation with RVR ([facility] ICD-10-CM: I48.91  ICD-9-CM: 427.31  7/16/2020 - Present Yes        * (Principal) Sepsis ([facility] ICD-10-CM: A41.9  ICD-9-CM: 038.9, 995.91  7/16/2020 - Present Yes        Neurogenic bladder ICD-10-CM: N31.9  ICD-9-CM: 596.54  7/16/2020 - Present Yes        Hypertension ICD-10-CM: I10  ICD-9-CM: 401.9  Unknown - Present Yes        GERD (gastroesophageal reflux disease) ICD-10-CM: K21.9  ICD-9-CM: 530.81  Unknown - Present Yes    Overview Signed 3/15/2017  2:59 PM by Juan Maverick     controlled w/med                   Plan:  # Sepsis   - S/p right TKA; X-rays show expected post-op changes. Incision examined on 7/16 and appears to be healing well.               - Sepsis resolved. WBCs improved. Afebrile. No in NSR.             - RLE seems less warm. No significant erythema or skin breakdown. Blood, urine and wound cultures are all negative. Will change to doxy/keflex to treat the cath site and con't to follow.     # AFib RVR              - 2/2 acute illness/sepsis              - SR. Con't PO BB.              - Would not anticoagulate now given post-op.     # Neurogenic bladder with suprapubic catheter              - Cath site with redness and drainage, mildly improved from yesterday. Culture NTD.     # HTN              - Home meds.     # GERD              - PPI     # MDD/anxiety              - Celexa; Ativan prn     DC planning/Dispo: Therapy recs placement.    Diet:  DIET REGULAR  DVT ppx: SCD    Signed:  Ricky Foster MD

## 2020-07-19 LAB
BACTERIA SPEC CULT: NORMAL
GRAM STN SPEC: NORMAL
GRAM STN SPEC: NORMAL
SERVICE CMNT-IMP: NORMAL

## 2020-07-19 PROCEDURE — 74011000302 HC RX REV CODE- 302: Performed by: INTERNAL MEDICINE

## 2020-07-19 PROCEDURE — 74011250637 HC RX REV CODE- 250/637: Performed by: INTERNAL MEDICINE

## 2020-07-19 PROCEDURE — 77030036688 HC BLNKT CLD THER S2SG -B

## 2020-07-19 PROCEDURE — 65270000029 HC RM PRIVATE

## 2020-07-19 PROCEDURE — 3331090002 HH PPS REVENUE DEBIT

## 2020-07-19 PROCEDURE — 87635 SARS-COV-2 COVID-19 AMP PRB: CPT

## 2020-07-19 PROCEDURE — 77010033678 HC OXYGEN DAILY

## 2020-07-19 PROCEDURE — 74011250636 HC RX REV CODE- 250/636: Performed by: INTERNAL MEDICINE

## 2020-07-19 PROCEDURE — 97530 THERAPEUTIC ACTIVITIES: CPT

## 2020-07-19 PROCEDURE — 3331090001 HH PPS REVENUE CREDIT

## 2020-07-19 PROCEDURE — 86580 TB INTRADERMAL TEST: CPT | Performed by: INTERNAL MEDICINE

## 2020-07-19 PROCEDURE — 94762 N-INVAS EAR/PLS OXIMTRY CONT: CPT

## 2020-07-19 RX ADMIN — DOXYCYCLINE HYCLATE 100 MG: 100 CAPSULE ORAL at 22:05

## 2020-07-19 RX ADMIN — TUBERCULIN PURIFIED PROTEIN DERIVATIVE 5 UNITS: 5 INJECTION, SOLUTION INTRADERMAL at 09:00

## 2020-07-19 RX ADMIN — LOSARTAN POTASSIUM 25 MG: 25 TABLET ORAL at 22:05

## 2020-07-19 RX ADMIN — CITALOPRAM HYDROBROMIDE 20 MG: 20 TABLET ORAL at 09:02

## 2020-07-19 RX ADMIN — ASPIRIN 81 MG: 81 TABLET, COATED ORAL at 09:02

## 2020-07-19 RX ADMIN — CEPHALEXIN 500 MG: 500 CAPSULE ORAL at 03:21

## 2020-07-19 RX ADMIN — OXYCODONE 5 MG: 5 TABLET ORAL at 06:14

## 2020-07-19 RX ADMIN — CEPHALEXIN 500 MG: 500 CAPSULE ORAL at 16:22

## 2020-07-19 RX ADMIN — OXYCODONE 5 MG: 5 TABLET ORAL at 16:22

## 2020-07-19 RX ADMIN — OXYCODONE 5 MG: 5 TABLET ORAL at 20:19

## 2020-07-19 RX ADMIN — OXYCODONE 5 MG: 5 TABLET ORAL at 00:02

## 2020-07-19 RX ADMIN — POLYETHYLENE GLYCOL (3350) 17 G: 17 POWDER, FOR SOLUTION ORAL at 08:59

## 2020-07-19 RX ADMIN — CEPHALEXIN 500 MG: 500 CAPSULE ORAL at 09:03

## 2020-07-19 RX ADMIN — DOXYCYCLINE HYCLATE 100 MG: 100 CAPSULE ORAL at 09:08

## 2020-07-19 RX ADMIN — METOPROLOL SUCCINATE 100 MG: 50 TABLET, EXTENDED RELEASE ORAL at 22:05

## 2020-07-19 RX ADMIN — HYDROCODONE BITARTRATE AND ACETAMINOPHEN 1 TABLET: 5; 325 TABLET ORAL at 03:21

## 2020-07-19 RX ADMIN — FUROSEMIDE 40 MG: 10 INJECTION, SOLUTION INTRAMUSCULAR; INTRAVENOUS at 09:03

## 2020-07-19 RX ADMIN — HYDROCODONE BITARTRATE AND ACETAMINOPHEN 1 TABLET: 5; 325 TABLET ORAL at 12:51

## 2020-07-19 RX ADMIN — DOCUSATE SODIUM 50MG AND SENNOSIDES 8.6MG 2 TABLET: 8.6; 5 TABLET, FILM COATED ORAL at 09:01

## 2020-07-19 RX ADMIN — HYDROCODONE BITARTRATE AND ACETAMINOPHEN 1 TABLET: 5; 325 TABLET ORAL at 09:01

## 2020-07-19 RX ADMIN — ASPIRIN 81 MG: 81 TABLET, COATED ORAL at 17:24

## 2020-07-19 RX ADMIN — CEPHALEXIN 500 MG: 500 CAPSULE ORAL at 22:05

## 2020-07-19 RX ADMIN — LORAZEPAM 0.5 MG: 0.5 TABLET ORAL at 00:02

## 2020-07-19 RX ADMIN — FUROSEMIDE 40 MG: 10 INJECTION, SOLUTION INTRAMUSCULAR; INTRAVENOUS at 17:24

## 2020-07-19 RX ADMIN — TRAZODONE HYDROCHLORIDE 100 MG: 50 TABLET ORAL at 22:05

## 2020-07-19 RX ADMIN — PANTOPRAZOLE SODIUM 40 MG: 40 TABLET, DELAYED RELEASE ORAL at 06:15

## 2020-07-19 NOTE — PROGRESS NOTES
Shift assessment complete. Pt resting in recliner with therapy assistance. Call light within reach. Bed low to ground and wheels locked. Pt able to dosi/plantar flex bilaterally with +2 pedal pulses. Dressing is dry and intact. Sally Dong in place. Suprapubic catheter draining yellow clear urine. IS at bedside. No needs at this time.

## 2020-07-19 NOTE — PROGRESS NOTES
O2 sat on room air into the low 70s. Placed on 2 liters of O2 via nasal cannula. Notified respiratory of changes. RT on the way. Will continue to monitor per shift.

## 2020-07-19 NOTE — PROGRESS NOTES
Hospitalist Note     Admit Date:  7/15/2020 10:37 PM   Name:  Vicky Bonilla   Age:  80 y.o.  :  1934   MRN:  [id number]   PCP:  Frank Garza MD  Treatment Team: Attending Provider: Mando Urbano MD; Utilization Review: Ai Tai RN; Care Manager: Amanda Garcia [facility]; Care Manager: Marilu Silva LMSW; Physical Therapist: Emelyn Villa, MIKE    HPI/Subjective:   Mr. Aminta Kingston is a nice 81 y/o WM sent to the ER from home on 7/15 for confusion. He recently underwent right TKA on . Yesterday a friend thought he was confused and dizzy and sent him to the ER. He was in rapid AFib and met sepsis criteria. CXR negative. Cultures collected. He was started on antibiotics and a Cardizem drip and admitted. Ortho consulted. : Up with PT today, very weak and had a hard time standing even with assistance. Walked a few steps then sat in the chair, didn't want to go further to the door/hallway. Great uop with Lasix. No SI/HI. No other complaints  Objective:     Patient Vitals for the past 24 hrs:   Temp Pulse Resp BP SpO2   20 0700 98.7 °F (37.1 °C) 87  103/60 91 %   20 0315 98.7 °F (37.1 °C) 96 18 124/81 96 %   20 2355 98.5 °F (36.9 °C) 97 18 129/73 90 %   20 2101     95 %   20 1928 99 °F (37.2 °C) 96 18 122/62 94 %   20 1611     100 %   20 1500 98.5 °F (36.9 °C) 84 20 140/65 100 %   20 1116  86  100/53 98 %     Oxygen Therapy  O2 Sat (%): 91 % (20 0700)  Pulse via Oximetry: 109 beats per minute (20)  O2 Device: Room air (20)  O2 Flow Rate (L/min): 0 l/min (20 2101)    Estimated body mass index is 32.06 kg/m² as calculated from the following:    Height as of this encounter: 6' 1\" (1.854 m). Weight as of this encounter: 110.2 kg (243 lb).       Intake/Output Summary (Last 24 hours) at 2020 0924  Last data filed at 2020 0315  Gross per 24 hour   Intake    Output 4825 ml   Net -4825 ml       *Note that automatically entered I/Os may not be accurate; dependent on patient compliance with collection and accurate  by techs. General:    Well nourished. Alert. Very weak. CV:   RRR. No murmur, rub, or gallop. Lungs:   CTAB. No wheezing, rhonchi, or rales. Abdomen:   Soft, nontender, nondistended. Extremities: Warm and dry. No cyanosis. RLE swelling, b/l stasis dermatitis, no appreciable erythema. Skin:     No rashes or jaundice. Neuro:  No gross focal deficits    Data Reviewed:  I have reviewed all labs, meds, and studies from the last 24 hours:  No results found for this or any previous visit (from the past 24 hour(s)).      Current Meds:  Current Facility-Administered Medications   Medication Dose Route Frequency    tuberculin injection 5 Units  5 Units IntraDERMal ONCE    furosemide (LASIX) injection 40 mg  40 mg IntraVENous BID    doxycycline (VIBRAMYCIN) capsule 100 mg  100 mg Oral Q12H    cephALEXin (KEFLEX) capsule 500 mg  500 mg Oral Q6H    sodium chloride (NS) flush 5-40 mL  5-40 mL IntraVENous Q8H    sodium chloride (NS) flush 5-40 mL  5-40 mL IntraVENous PRN    acetaminophen (TYLENOL) tablet 650 mg  650 mg Oral Q4H PRN    HYDROcodone-acetaminophen (NORCO) 5-325 mg per tablet 1 Tab  1 Tab Oral Q4H PRN    naloxone (NARCAN) injection 0.4 mg  0.4 mg IntraVENous PRN    ondansetron (ZOFRAN) injection 4 mg  4 mg IntraVENous Q4H PRN    senna-docusate (PERICOLACE) 8.6-50 mg per tablet 2 Tab  2 Tab Oral DAILY PRN    LORazepam (ATIVAN) tablet 0.5 mg  0.5 mg Oral Q4H PRN    zolpidem (AMBIEN) tablet 5 mg  5 mg Oral QHS PRN    aspirin delayed-release tablet 81 mg  81 mg Oral BID    citalopram (CELEXA) tablet 20 mg  20 mg Oral DAILY    losartan (COZAAR) tablet 25 mg  25 mg Oral QHS    metoprolol succinate (TOPROL-XL) XL tablet 100 mg  100 mg Oral QHS    pantoprazole (PROTONIX) tablet 40 mg  40 mg Oral ACB    oxyCODONE IR (ROXICODONE) tablet 5 mg  5 mg Oral Q4H PRN    polyethylene glycol (MIRALAX) packet 17 g  17 g Oral DAILY    senna-docusate (PERICOLACE) 8.6-50 mg per tablet 1 Tab  1 Tab Oral DAILY    traZODone (DESYREL) tablet 100 mg  100 mg Oral QHS PRN    metoprolol (LOPRESSOR) injection 5 mg  5 mg IntraVENous Q4H PRN       Other Studies:  No results found for this visit on 07/15/20. No results found.     All Micro Results     Procedure Component Value Units Date/Time    CULTURE, BLOOD [[id number]] Collected:  07/15/20 2249    Order Status:  Completed Specimen:  Blood Updated:  07/19/20 0744     Special Requests: --        RIGHT  Antecubital       Culture result: NO GROWTH 4 DAYS       CULTURE, BLOOD [[id number]] Collected:  07/15/20 2334    Order Status:  Completed Specimen:  Blood Updated:  07/19/20 0744     Special Requests: --        RIGHT  HAND       Culture result: NO GROWTH 4 DAYS       CULTURE, Ermalinda Rued STAIN [[id number]] Collected:  07/16/20 1213    Order Status:  Completed Specimen:  Wound from Skin Updated:  07/19/20 0730     Special Requests: SUPRAPUBIC     GRAM STAIN 1 TO 5 WBCS SEEN PER OIF      NO DEFINITE ORGANISM SEEN        Culture result: NO GROWTH 2 DAYS       CULTURE, URINE [[id number]] Collected:  07/15/20 2321    Order Status:  Completed Specimen:  Urine Updated:  07/18/20 0837     Special Requests: NO SPECIAL REQUESTS        Culture result:       <10,000 COLONIES/mL MIXED SKIN MOE ISOLATED                SARS-CoV-2 Lab Results  \"Novel Coronavirus\" Test: No results found for: COV2NT   \"Emergent Disease\" Test: No results found for: EDPR  \"SARS-COV-2\" Test: No results found for: XGCOVT  \"Precision Labs\" Test: No results found for: RSLT  Rapid Test: No results found for: COVR         Assessment and Plan:     Hospital Problems as of 7/19/2020 Date Reviewed: 7/13/2020          Codes Class Noted - Resolved POA    Atrial fibrillation with RVR ([facility] [address] ICD-10-CM: I48.91  ICD-9-CM: 427.31  7/16/2020 - Present Yes        * (Principal) Sepsis [facility]) ICD-10-CM: A41.9  ICD-9-CM: 038.9, 995.91  7/16/2020 - Present Yes        Neurogenic bladder ICD-10-CM: N31.9  ICD-9-CM: 596.54  7/16/2020 - Present Yes        Hypertension ICD-10-CM: I10  ICD-9-CM: 401.9  Unknown - Present Yes        GERD (gastroesophageal reflux disease) ICD-10-CM: K21.9  ICD-9-CM: 530.81  Unknown - Present Yes    Overview Signed 3/15/2017  2:59 PM by Urvashi Head     controlled w/med                   Plan:  # Sepsis              - S/p right TKA; X-rays show expected post-op changes. Incision examined on 7/16 and appears to be healing well.               - Sepsis resolved. - Presumably suprapubic cath site, though all cultures are negative to date. Changed to PO abx on 7/18. # Acute volume overload   - 2/2 sepsis fluids. Tolerating Lasix, >6L neg now. # MDD/anxiety              - Celexa; Ativan prn     Jacinto he wanted to kill himself a few nights ago; denies any further SI/HI with me and during Psych consult so no indication for involuntary commitment. # AFib RVR              - 2/2 acute illness/sepsis              - SR. Con't PO BB.              - Would not anticoagulate now given post-op and a relatively isolated issue. # Neurogenic bladder with suprapubic catheter              - Culture NTD     # HTN              - Home meds.     # GERD              - PPI    DC planning/Dispo: Refused rehab post-op, still doesn't want to go but I had a long d/w him today. He is not safe to go home and I don't think his family can manage him. He needs to go to rehab, he seems open to the idea now.   Diet:  DIET REGULAR  DVT ppx: SCDs      Signed:  Khoi Thomas MD

## 2020-07-19 NOTE — PROGRESS NOTES
Pt alert and oriented. Laying in bed. Pt somewhat agitated and anxious as he c/o tv not working properly, was recently switched to another room b/c of c/o tv not working in previous room. Pt also c/o pain and stating meds were not ordered right. Assessment and VS completed. Oxycodone, ativan given for pain and anxiety. Assisted pt to turn tv to a local channel to watch. Martinez in place and emptied. Pt wearing O2 via nasal cannula. Call light, IS at bedside within reach. Pt instructed to call for any needs/concerns and he voiced understanding.

## 2020-07-19 NOTE — PROGRESS NOTES
Problem: Mobility Impaired (Adult and Pediatric)  Goal: *Acute Goals and Plan of Care (Insert Text)  Description: GOALS (1-4 days):  (1.)Mr. Garrett Macias will move from supine to sit and sit to supine  in bed with MINIMAL ASSIST. Met 7/19  (2.)Mr. Garrett Macias will transfer from bed to chair and chair to bed with MINIMAL ASSIST using the least restrictive device. (3.)Mr. Garrett Macias will ambulate with MINIMAL ASSIST for 25 feet with the least restrictive device. (4)Mr. Garrett Macias will increase right knee ROM to 5°-80° and perform HEP with cues and assistance. .  ________________________________________________________________________________________________      PHYSICAL THERAPY: Daily Note and AM 7/19/2020  INPATIENT: PT Visit Days : 3  Payor: Inder [place] / Plan: Brook [place]O / Product Type: Managed Care Medicare /       NAME/AGE/GENDER: Marley Schwarz is a 80 y.o. male   PRIMARY DIAGNOSIS: Sepsis ([facility] [address] [A41.9]  Atrial fibrillation with RVR ([facility] [address] [I48.91] Sepsis ([facility] [address] Sepsis ([facility] [address]       ICD-10: Treatment Diagnosis:    · Generalized Muscle Weakness (M62.81)  · Other abnormalities of gait and mobility (R26.89)   Precaution/Allergies:  Patient has no known allergies. ASSESSMENT:     Mr. Garrett Macias showed improved bed mobility skills, along with improved level of assist for transfers also. pt still is very weak, being more complicated to manage than family or caregivers could provide. This pt needed ice cuff on knee to control edema & aggressive post acute therapy at [facility] or SNF if 9th floor not available. This pt is motivated to work & has good DC plan to home with caregivers. This pt would likely progress more quickly with more intense rehab efforts. This section established at most recent assessment   PROBLEM LIST (Impairments causing functional limitations):  1. Decreased Strength  2. Decreased ADL/Functional Activities  3. Decreased Transfer Abilities  4. Decreased Ambulation Ability/Technique  5.  Decreased Balance  6. Increased Pain  7. Decreased Activity Tolerance  8. Increased Fatigue  9. Decreased Flexibility/Joint Mobility  10. Edema/Girth  11. Decreased Hunt with Home Exercise Program   INTERVENTIONS PLANNED: (Benefits and precautions of physical therapy have been discussed with the patient.)  1. Balance Exercise  2. Bed Mobility  3. Family Education  4. Gait Training  5. Home Exercise Program (HEP)  6. Range of Motion (ROM)  7. Therapeutic Activites  8. Therapeutic Exercise/Strengthening  9. Transfer Training     TREATMENT PLAN: Frequency/Duration: daily for duration of hospital stay  Rehabilitation Potential For Stated Goals: [facility] (at time of discharge pending progress):    Placement:  rehab  Equipment:    None at this time              HISTORY:   History of Present Injury/Illness (Reason for Referral):  Mr. Nathaniel Kellogg is a nice 79 y/o WM sent to the ER from home on 7/15 for confusion. 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 Yesterday a friend thought he was confused and dizzy and sent him to the ER. He was in rapid AFib and met sepsis criteria. CXR negative. Cultures collected. He was started on antibiotics and a Cardizem drip and admitted. Ortho consulted. 7/17: O2 from 2L to 4L. Afebrile overnight. WBCs up a little. He is asking if he can go home today. Overnight nurse changed knee bandage, I reviewed a picture that was taken of the surgical site and there was one circular area of erythema just distal to the knee, lateral side of incision. Had to get Cardizem bolus overnight, back in NSR this morning. Bps good.   Past Medical History/Comorbidities:   Mr. Nathaniel Kellogg  has a past medical history of Acute lumbar radiculopathy, Arthritis, Ascending aorta dilatation ([address] Atrial fibrillation with RVR ([address] Enlarged prostate, Martinez catheter in place, GERD (gastroesophageal reflux disease), High cholesterol, Hypertension, ICH (intracerebral hemorrhage) ([address] Lumbar stenosis with neurogenic claudication, Other forms of scoliosis, lumbar region, Poor historian, Psychiatric disorder, Seizures ([address] Tongue lesion, and Urinary frequency. Mr. Yuniel Mack  has a past surgical history that includes hx other surgical; hx turp (10/20/11); hx heent (8/2012); hx orthopaedic (Right); and hx other surgical.  Social History/Living Environment:   Home Environment: Private residence  # Steps to Enter: 1(4 ft ramp for dog)  One/Two Story Residence: One story  Living Alone: Yes  Support Systems: Family member(s), Friends \ neighbors, Home care staff  Patient Expects to be Discharged to[de-identified] Private residence  Current DME Used/Available at Home: karthikeyan Kraus  Prior Level of Function/Work/Activity:  Using RW after tka     Number of Personal Factors/Comorbidities that affect the Plan of Care: 3+: HIGH COMPLEXITY   EXAMINATION:   Most Recent Physical Functioning:   Gross Assessment:          RLE PROM  R Knee Flexion: 72  R Knee Extension: -15       Posture:     Balance:  Sitting: Intact; Without support  Standing: Impaired; With support(walker)  Standing - Static: (fair- with walker)  Standing - Dynamic : (fair- with walker) Bed Mobility:  Supine to Sit: Contact guard assistance  Sit to Supine: (NT)  Scooting: Minimum assistance  Wheelchair Mobility:     Transfers:  Sit to Stand: Moderate assistance  Stand to Sit: Moderate assistance  Bed to Chair: Moderate assistance(with walker)  Duration: 30 Minutes(extra time to work through activity noted)  Gait:  Right Side Weight Bearing: As tolerated  Speed/Elena: Shuffled; Slow  Step Length: Left shortened;Right shortened  Stance: Right decreased  Gait Abnormalities: Antalgic;Decreased step clearance;Trunk sway increased(flexed posture)  Distance (ft): 7 Feet (ft)  Assistive Device: Walker, rolling  Ambulation - Level of Assistance: Minimal assistance  Interventions: Safety awareness training;Verbal cues      Body Structures Involved:  1. Bones  2. Joints  3. Muscles  4. Ligaments Body Functions Affected:  1. Movement Related Activities and Participation Affected:  1. Mobility   Number of elements that affect the Plan of Care: 3: MODERATE COMPLEXITY   CLINICAL PRESENTATION:   Presentation: Stable and uncomplicated: LOW COMPLEXITY   CLINICAL DECISION MAKIN[address] [place] [id number] AM-PAC 6 Clicks   Basic Mobility Inpatient Short Form  How much difficulty does the patient currently have. .. Unable A Lot A Little None   1. Turning over in bed (including adjusting bedclothes, sheets and blankets)? [] 1   [x] 2   [] 3   [] 4   2. Sitting down on and standing up from a chair with arms ( e.g., wheelchair, bedside commode, etc.)   [] 1   [x] 2   [] 3   [] 4   3. Moving from lying on back to sitting on the side of the bed? [] 1   [x] 2   [] 3   [] 4   How much help from another person does the patient currently need. .. Total A Lot A Little None   4. Moving to and from a bed to a chair (including a wheelchair)? [] 1   [x] 2   [] 3   [] 4   5. Need to walk in hospital room? [x] 1   [] 2   [] 3   [] 4   6. Climbing 3-5 steps with a railing? [x] 1   [] 2   [] 3   [] 4   © , Trustees of [address] [place] [address], under license to [employer]. All rights reserved      Score:  Initial: 10 Most Recent: X (Date: -- )    Interpretation of Tool:  Represents activities that are increasingly more difficult (i.e. Bed mobility, Transfers, Gait). Medical Necessity:     · Patient is expected to demonstrate progress in   · strength, range of motion, and balance  ·  to   · decrease assistance required with exercises and functional mobility  · . Reason for Services/Other Comments:  · Patient   · continues to require present interventions due to patient's inability to perform exercises and functional mobility independently  · .    Use of outcome tool(s) and clinical judgement create a POC that gives a: Questionable prediction of patient's progress: MODERATE COMPLEXITY            TREATMENT:   (In addition to Assessment/Re-Assessment sessions the following treatments were rendered)   Pre-treatment Symptoms/Complaints:  \" I can't go home ? \"  Pain: Initial: numeric scale  Pain Intensity 1: 4  Pain Location 1: Knee  Pain Orientation 1: Right  Pain Intervention(s) 1: Ambulation/Increased Activity, Cold pack  Post Session: 4/10   assessment  Therapeutic Activity: (  30 Minutes(extra time to work through activity noted) ):  Therapeutic activities including TKA protocol exercises, bed mobility, sit<>stand, standing balance with walker, short distance ambulation with rolling walker to improve mobility, strength, and balance. Required moderate Safety awareness training;Verbal cues to promote static and dynamic balance in standing. Date:  7/17 Date:  7/19 Date:     ACTIVITY/EXERCISE AM PM AM PM AM PM   GROUP THERAPY  []  []  []  []  []  []   Ankle Pumps  15a 20      Quad Sets  15aa 20      Gluteal Sets  15a 20      Hip ABd/ADduction  15aa 20aa      Straight Leg Raises   20aa      Knee Slides  15aa 20aa      Short Arc Quads   20aa      Long Arc Quads         Chair Slides   20aa               B = bilateral; AA = active assistive; A = active; P = passive       Braces/Orthotics/Lines/Etc:   · IV  · arnett catheter  · O2 Device: Nasal [id number]  Treatment/Session Assessment:    · Response to Treatment:  Pt needs additional rehab on DC. · Interdisciplinary Collaboration:   o Registered Nurse  o Physician  o   · After treatment position/precautions:   o Up in chair  o Bed/Chair-wheels locked  o Call light within reach  o RN notified  o Nurse at bedside   · Compliance with Program/Exercises: Will assess as treatment progresses  · Recommendations/Intent for next treatment session: \"Next visit will focus on advancements to more challenging activities and reduction in assistance provided\".   Total Treatment Duration:  PT Patient Time In/Time Out  Time In: 0827  Time Out: [address] Gabby Hanks, PT

## 2020-07-19 NOTE — PROGRESS NOTES
Care Management Interventions  PCP Verified by CM: Yes  Transition of Care Consult (CM Consult): SNF  [address]: Yes  Physical Therapy Consult: Yes  Occupational Therapy Consult: Yes  Current Support Network: Lives Alone  The Plan for Transition of Care is Related to the Following Treatment Goals : Increase independence  The Patient and/or Patient Representative was Provided with a Choice of Provider and Agrees with the Discharge Plan?: Yes  [place]edom of Choice List was Provided with Basic Dialogue that Supports the Patient's Individualized Plan of Care/Goals, Treatment Preferences and Shares the Quality Data Associated with the Providers?: Yes  Discharge Location  Discharge Placement: Skilled nursing facility  Spoke with pt and his gdtr today. Pt is still  reluctant about rehab but more willing to consider. Provided patient with SNF choice list.  Insurance is [email or web address]. Pt and gdtr OK with referrals to [facility] [facility]. SW to follow.

## 2020-07-20 ENCOUNTER — APPOINTMENT (OUTPATIENT)
Dept: GENERAL RADIOLOGY | Age: 85
DRG: 698 | End: 2020-07-20
Attending: INTERNAL MEDICINE
Payer: MEDICARE

## 2020-07-20 ENCOUNTER — HOME CARE VISIT (OUTPATIENT)
Dept: SCHEDULING | Facility: HOME HEALTH | Age: 85
End: 2020-07-20
Payer: MEDICARE

## 2020-07-20 PROBLEM — N17.9 AKI (ACUTE KIDNEY INJURY) (HCC): Status: ACTIVE | Noted: 2020-07-20

## 2020-07-20 LAB
ALBUMIN SERPL-MCNC: 2.2 G/DL (ref 3.2–4.6)
ALBUMIN/GLOB SERPL: 0.5 {RATIO} (ref 1.2–3.5)
ALP SERPL-CCNC: 124 U/L (ref 50–136)
ALT SERPL-CCNC: 61 U/L (ref 12–65)
ANION GAP SERPL CALC-SCNC: 6 MMOL/L (ref 7–16)
AST SERPL-CCNC: 42 U/L (ref 15–37)
BACTERIA SPEC CULT: NORMAL
BACTERIA SPEC CULT: NORMAL
BASOPHILS # BLD: 0.1 K/UL (ref 0–0.2)
BASOPHILS NFR BLD: 0 % (ref 0–2)
BILIRUB DIRECT SERPL-MCNC: 0.6 MG/DL
BILIRUB SERPL-MCNC: 1 MG/DL (ref 0.2–1.1)
BUN SERPL-MCNC: 36 MG/DL (ref 8–23)
CALCIUM SERPL-MCNC: 8.4 MG/DL (ref 8.3–10.4)
CHLORIDE SERPL-SCNC: 99 MMOL/L (ref 98–107)
CO2 SERPL-SCNC: 31 MMOL/L (ref 21–32)
CREAT SERPL-MCNC: 1.51 MG/DL (ref 0.8–1.5)
DIFFERENTIAL METHOD BLD: ABNORMAL
EOSINOPHIL # BLD: 0.3 K/UL (ref 0–0.8)
EOSINOPHIL NFR BLD: 2 % (ref 0.5–7.8)
ERYTHROCYTE [DISTWIDTH] IN BLOOD BY AUTOMATED COUNT: 13.6 % (ref 11.9–14.6)
GLOBULIN SER CALC-MCNC: 4.7 G/DL (ref 2.3–3.5)
GLUCOSE SERPL-MCNC: 122 MG/DL (ref 65–100)
HCT VFR BLD AUTO: 31.1 % (ref 41.1–50.3)
HGB BLD-MCNC: 9.6 G/DL (ref 13.6–17.2)
IMM GRANULOCYTES # BLD AUTO: 0.1 K/UL (ref 0–0.5)
IMM GRANULOCYTES NFR BLD AUTO: 1 % (ref 0–5)
LYMPHOCYTES # BLD: 1.7 K/UL (ref 0.5–4.6)
LYMPHOCYTES NFR BLD: 11 % (ref 13–44)
MCH RBC QN AUTO: 30.5 PG (ref 26.1–32.9)
MCHC RBC AUTO-ENTMCNC: 30.9 G/DL (ref 31.4–35)
MCV RBC AUTO: 98.7 FL (ref 79.6–97.8)
MM INDURATION POC: 0 MM (ref 0–5)
MONOCYTES # BLD: 2 K/UL (ref 0.1–1.3)
MONOCYTES NFR BLD: 12 % (ref 4–12)
NEUTS SEG # BLD: 11.9 K/UL (ref 1.7–8.2)
NEUTS SEG NFR BLD: 74 % (ref 43–78)
NRBC # BLD: 0 K/UL (ref 0–0.2)
PLATELET # BLD AUTO: 303 K/UL (ref 150–450)
PMV BLD AUTO: 8.9 FL (ref 9.4–12.3)
POTASSIUM SERPL-SCNC: 3.9 MMOL/L (ref 3.5–5.1)
PPD POC: NEGATIVE NEGATIVE
PROT SERPL-MCNC: 6.9 G/DL (ref 6.3–8.2)
RBC # BLD AUTO: 3.15 M/UL (ref 4.23–5.6)
SERVICE CMNT-IMP: NORMAL
SERVICE CMNT-IMP: NORMAL
SODIUM SERPL-SCNC: 136 MMOL/L (ref 136–145)
WBC # BLD AUTO: 16.1 K/UL (ref 4.3–11.1)

## 2020-07-20 PROCEDURE — 3331090001 HH PPS REVENUE CREDIT

## 2020-07-20 PROCEDURE — 77010033678 HC OXYGEN DAILY

## 2020-07-20 PROCEDURE — 65270000029 HC RM PRIVATE

## 2020-07-20 PROCEDURE — 97166 OT EVAL MOD COMPLEX 45 MIN: CPT

## 2020-07-20 PROCEDURE — 80048 BASIC METABOLIC PNL TOTAL CA: CPT

## 2020-07-20 PROCEDURE — 97535 SELF CARE MNGMENT TRAINING: CPT

## 2020-07-20 PROCEDURE — 94762 N-INVAS EAR/PLS OXIMTRY CONT: CPT

## 2020-07-20 PROCEDURE — 3331090002 HH PPS REVENUE DEBIT

## 2020-07-20 PROCEDURE — 77030027138 HC INCENT SPIROMETER -A

## 2020-07-20 PROCEDURE — 85025 COMPLETE CBC W/AUTO DIFF WBC: CPT

## 2020-07-20 PROCEDURE — 74011250636 HC RX REV CODE- 250/636: Performed by: NURSE PRACTITIONER

## 2020-07-20 PROCEDURE — 97530 THERAPEUTIC ACTIVITIES: CPT

## 2020-07-20 PROCEDURE — 74011250636 HC RX REV CODE- 250/636: Performed by: FAMILY MEDICINE

## 2020-07-20 PROCEDURE — 74011250637 HC RX REV CODE- 250/637: Performed by: INTERNAL MEDICINE

## 2020-07-20 PROCEDURE — 36415 COLL VENOUS BLD VENIPUNCTURE: CPT

## 2020-07-20 PROCEDURE — 71045 X-RAY EXAM CHEST 1 VIEW: CPT

## 2020-07-20 PROCEDURE — 80076 HEPATIC FUNCTION PANEL: CPT

## 2020-07-20 PROCEDURE — 74011250637 HC RX REV CODE- 250/637: Performed by: NURSE PRACTITIONER

## 2020-07-20 RX ORDER — CEPHALEXIN 500 MG/1
500 CAPSULE ORAL EVERY 8 HOURS
Status: DISCONTINUED | OUTPATIENT
Start: 2020-07-20 | End: 2020-07-22

## 2020-07-20 RX ORDER — ACETAMINOPHEN 500 MG
500 TABLET ORAL
Status: DISCONTINUED | OUTPATIENT
Start: 2020-07-20 | End: 2020-07-20

## 2020-07-20 RX ORDER — SODIUM CHLORIDE 9 MG/ML
500 INJECTION, SOLUTION INTRAVENOUS ONCE
Status: COMPLETED | OUTPATIENT
Start: 2020-07-20 | End: 2020-07-20

## 2020-07-20 RX ORDER — ACETAMINOPHEN 500 MG
1000 TABLET ORAL
Status: DISCONTINUED | OUTPATIENT
Start: 2020-07-20 | End: 2020-07-29 | Stop reason: HOSPADM

## 2020-07-20 RX ADMIN — OXYCODONE 5 MG: 5 TABLET ORAL at 03:01

## 2020-07-20 RX ADMIN — CEPHALEXIN 500 MG: 500 CAPSULE ORAL at 03:01

## 2020-07-20 RX ADMIN — ASPIRIN 81 MG: 81 TABLET, COATED ORAL at 17:23

## 2020-07-20 RX ADMIN — HYDROCODONE BITARTRATE AND ACETAMINOPHEN 1 TABLET: 5; 325 TABLET ORAL at 17:23

## 2020-07-20 RX ADMIN — OXYCODONE 5 MG: 5 TABLET ORAL at 23:48

## 2020-07-20 RX ADMIN — PANTOPRAZOLE SODIUM 40 MG: 40 TABLET, DELAYED RELEASE ORAL at 06:38

## 2020-07-20 RX ADMIN — CITALOPRAM HYDROBROMIDE 20 MG: 20 TABLET ORAL at 08:44

## 2020-07-20 RX ADMIN — DOXYCYCLINE HYCLATE 100 MG: 100 CAPSULE ORAL at 21:22

## 2020-07-20 RX ADMIN — CEPHALEXIN 500 MG: 500 CAPSULE ORAL at 08:49

## 2020-07-20 RX ADMIN — ACETAMINOPHEN 650 MG: 325 TABLET, FILM COATED ORAL at 01:43

## 2020-07-20 RX ADMIN — SODIUM CHLORIDE 500 ML/HR: 9 INJECTION, SOLUTION INTRAVENOUS at 19:05

## 2020-07-20 RX ADMIN — ASPIRIN 81 MG: 81 TABLET, COATED ORAL at 08:44

## 2020-07-20 RX ADMIN — SODIUM CHLORIDE 500 ML: 900 INJECTION, SOLUTION INTRAVENOUS at 21:22

## 2020-07-20 RX ADMIN — CEPHALEXIN 500 MG: 500 CAPSULE ORAL at 21:22

## 2020-07-20 RX ADMIN — DOCUSATE SODIUM 50MG AND SENNOSIDES 8.6MG 1 TABLET: 8.6; 5 TABLET, FILM COATED ORAL at 08:44

## 2020-07-20 RX ADMIN — HYDROCODONE BITARTRATE AND ACETAMINOPHEN 1 TABLET: 5; 325 TABLET ORAL at 00:01

## 2020-07-20 RX ADMIN — CEPHALEXIN 500 MG: 500 CAPSULE ORAL at 15:12

## 2020-07-20 RX ADMIN — POLYETHYLENE GLYCOL (3350) 17 G: 17 POWDER, FOR SOLUTION ORAL at 08:45

## 2020-07-20 RX ADMIN — OXYCODONE 5 MG: 5 TABLET ORAL at 11:44

## 2020-07-20 RX ADMIN — DOXYCYCLINE HYCLATE 100 MG: 100 CAPSULE ORAL at 08:43

## 2020-07-20 RX ADMIN — ACETAMINOPHEN 1000 MG: 500 TABLET, FILM COATED ORAL at 21:22

## 2020-07-20 RX ADMIN — LORAZEPAM 0.5 MG: 0.5 TABLET ORAL at 08:44

## 2020-07-20 RX ADMIN — HYDROCODONE BITARTRATE AND ACETAMINOPHEN 1 TABLET: 5; 325 TABLET ORAL at 08:43

## 2020-07-20 RX ADMIN — OXYCODONE 5 MG: 5 TABLET ORAL at 06:37

## 2020-07-20 RX ADMIN — Medication 10 ML: at 23:48

## 2020-07-20 NOTE — PROGRESS NOTES
Patient in recliner. Alert and oriented x4. Lungs clear/diminished. Active bowel sounds. Dressing dry and intact. Patient requests pain med and Ativan SEE MAR. Neurovascular status WDL. Palpable pulses. Positive dorsiflexion and plantar flexion. Instructed not to get up by themselves and call for assistance or any needs. Patient verbalized understanding. Call bell within reach. Side rails up x3. Bed low and locked. No distress noted.

## 2020-07-20 NOTE — PROGRESS NOTES
At 1855: BP 88/44,  told Dr. Jacqui Schmitt verbally. Apollo Joel involved as well. Verbal order for bolus received and started infusion at this time. Yudith Barney at bedside.
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
## 2020-07-20 NOTE — PROGRESS NOTES
Tele psych was completed by Dr. Ellie Eugene on 07.17.2020 at 1154 pm. Paper documentation in paper chart.

## 2020-07-20 NOTE — PROGRESS NOTES
Problem: Self Care Deficits Care Plan (Adult)  Goal: *Acute Goals and Plan of Care (Insert Text)  Description: GOALS:   DISCHARGE GOALS (in preparation for going home/rehab):  3 days  1. Mr. Tracee Jensen will perform one lower body dressing activity with modeate assistance required to demonstrate improved functional mobility and safety. 2.  Mr. Tracee Jensen will perform one lower body bathing activity with minimal assistance required to demonstrate improved functional mobility and safety. 3.  Mr. Tracee Jensen will perform toileting/toilet transfer with contact guard assistance to demonstrate improved functional mobility and safety. 4.  Mr. Tracee Jensen will perform shower transfer with contact guard assistance to demonstrate improved functional mobility and safety. JOINT CAMP OCCUPATIONAL THERAPY TKA: Initial Assessment and Daily Note 7/20/2020  INPATIENT: Hospital Day: 6  Payor: [facility] MEDICARE / Plan: [facility] MEDICARE HMO/PPO / Product Type: Managed Care Medicare /      NAME/AGE/GENDER: Shmuel Rowland is a 80 y.o. male   PRIMARY DIAGNOSIS:  * No surgery found *   * Surgery not found * (Cannot find OR record)  ICD-10: Treatment Diagnosis:    · Pain in Right Knee (M25.561)  · Stiffness of Right Knee, Not elsewhere classified (Q97.127)      ASSESSMENT:     Mr. Tracee Jensen is s/p Right TKA last week and returned and presents with decreased weight bearing on R LE and decreased independence with functional mobility and activities of daily living as compared to baseline level of function and safety. Max assist into stance. Normally patient lives alone and has help from friend at times for self cares. Walks with rollator and limited mobility prior to surgery. High pain. Fatigues quickly. Will need rehab. This section established at most recent assessment   PROBLEM LIST (Impairments causing functional limitations):  1. Decreased Strength  2. Decreased ADL/Functional Activities  3.  Decreased Transfer Abilities  4. Increased Pain  5. Increased Fatigue  6. Decreased Flexibility/Joint Mobility  7. Decreased Knowledge of Precautions   INTERVENTIONS PLANNED: (Benefits and precautions of occupational therapy have been discussed with the patient.)  1. Activities of daily living training  2. Adaptive equipment training  3. Balance training  4. Clothing management  5. Donning&doffing training  6. Therapeutic activity  7. Therapeutic exercise     TREATMENT PLAN: Frequency/Duration: Follow patient 4x a week to address above goals. Rehabilitation Potential For Stated Goals: Good     RECOMMENDED REHABILITATION/EQUIPMENT: (at time of discharge pending progress): Continue Skilled Therapy. OCCUPATIONAL PROFILE AND HISTORY:   History of Present Injury/Illness (Reason for Referral): Pt presents this date s/p (Right) TKA 7/13  Past Medical History/Comorbidities:   Mr. Constance Pearson  has a past medical history of Acute lumbar radiculopathy, Arthritis, Ascending aorta dilatation ([address] Atrial fibrillation with RVR ([address] Enlarged prostate, Martinez catheter in place, GERD (gastroesophageal reflux disease), High cholesterol, Hypertension, ICH (intracerebral hemorrhage) ([address] Lumbar stenosis with neurogenic claudication, Other forms of scoliosis, lumbar region, Poor historian, Psychiatric disorder, Seizures ([address] Tongue lesion, and Urinary frequency. Mr. Constance Pearson  has a past surgical history that includes hx other surgical; hx turp (10/20/11); hx heent (8/2012); hx orthopaedic (Right); and hx other surgical.  Social History/Living Environment:   Home Environment: Private residence  # Steps to Enter: 1(4 ft ramp for dog)  One/Two Story Residence: One story  Living Alone: Yes  Support Systems: Family member(s)  Patient Expects to be Discharged to[de-identified] Skilled nursing facility  Current DME Used/Available at Home: [address], rollator  Prior Level of Function/Work/Activity:  Uses rollator. Assist with bathing and IADL's.       Number of Personal Factors/Comorbidities that affect the Plan of Care: Expanded review of therapy/medical records (1-2):  MODERATE COMPLEXITY   ASSESSMENT OF OCCUPATIONAL PERFORMANCE[de-identified]   Most Recent Physical Functioning:   Balance  Sitting: Intact; Without support  Standing: Impaired; With support(walker)                  LLE Assessment  LLE Assessment (WDL): Within defined limits Coordination  Fine Motor Skills-Upper: Left Intact; Right Intact  Gross Motor Skills-Upper: Left Intact; Right Intact         Mental Status  Neurologic State: Alert  Orientation Level: Oriented X4  Cognition: Appropriate decision making          RLE PROM  R Knee Flexion: 90  R Knee Extension: -15     Basic ADLs (From Assessment) Complex ADLs (From Assessment)   Basic ADL  Type of Bath: Patient refused     Grooming/Bathing/Dressing Activities of Daily Living                             Bed/Mat Mobility  Supine to Sit: Moderate assistance  Sit to Supine: Moderate assistance  Sit to Stand: Maximum assistance  Stand to Sit: Maximum assistance  Bed to Chair: Maximum assistance  Scooting: Moderate assistance         Physical Skills Involved:  1. Range of Motion  2. Balance  3. Strength  4. Activity Tolerance  5. Fine Motor Control  6. Gross Motor Control  7. Pain (acute) Cognitive Skills Affected (resulting in the inability to perform in a timely and safe manner):  1. [facility] Psychosocial Skills Affected:  1. Habits/Routines  2. Environmental Adaptation  3. Social Interaction   Number of elements that affect the Plan of Care: 3-5:  MODERATE COMPLEXITY   CLINICAL DECISION MAKIN [facility] [address] [id number] AM-PAC 6 Clicks   Daily Activity Inpatient Short Form  How much help from another person does the patient currently need. .. Total A Lot A Little None   1. Putting on and taking off regular lower body clothing? [x] 1   [] 2   [] 3   [] 4   2. Bathing (including washing, rinsing, drying)? [x] 1   [] 2   [] 3   [] 4   3.   Toileting, which includes using toilet, bedpan or urinal?   [x] 1   [] 2   [] 3   [] 4   4. Putting on and taking off regular upper body clothing? [] 1   [] 2   [x] 3   [] 4   5. Taking care of personal grooming such as brushing teeth? [] 1   [] 2   [x] 3   [] 4   6. Eating meals? [] 1   [] 2   [x] 3   [] 4   © 2007, Trustees of [address] [place] [address] [address] [id number], under license to [employer]. All rights reserved     Score:  Initial: 12 Most Recent: X (Date: -- )    Interpretation of Tool:  Represents activities that are increasingly more difficult (i.e. Bed mobility, Transfers, Gait). Medical Necessity:     · Skilled intervention continues to be required due to Deficits listed above. Reason for Services/Other Comments:  · Patient continues to require skilled intervention due to   · Dx above  · . Use of outcome tool(s) and clinical judgement create a POC that gives a: MODERATE COMPLEXITY            TREATMENT:   (In addition to Assessment/Re-Assessment sessions the following treatments were rendered)     Pre-treatment Symptoms/Complaints:    Pain: Initial:   Pain Intensity 1: 7  Pain Location 1: Knee  Pain Orientation 1: Right  Post Session:  7     Self Care: (13): Procedure(s) (per grid) utilized to improve and/or restore self-care/home management as related to dressing, bathing, and toileting. Required maximal verbal and tactile cueing to facilitate activities of daily living skills. Initial evaluation 10 minutes. Treatment/Session Assessment:     Response to Treatment:  Good, supine in bed.     Education:  [] Home Exercises  [x] Fall Precautions  [] Hip Precautions [] Going Home Video  [x] Knee/Hip Prosthesis Review  [x] Walker Management/Safety [x] Adaptive Equipment as Needed       Interdisciplinary Collaboration:   o Physical Therapist  o Occupational Therapist  o Registered Nurse    After treatment position/precautions:   o Supine in bed  o Bed/Chair-wheels locked  o Caregiver at bedside  o Call light within reach  o RN notified     Compliance with Program/Exercises: Compliant all of the time, Will assess as treatment progresses. Recommendations/Intent for next treatment session:  Treatment next visit will focus on increasing Mr. Kemal Garcia independence with bed mobility, transfers, self care, functional mobility, modalities for pain, and patient education.       Total Treatment Duration:  OT Patient Time In/Time Out  Time In: 1330  Time Out: 1441 Cecille Elizabeth OT

## 2020-07-20 NOTE — PROGRESS NOTES
Hospitalist Note     Admit Date:  7/15/2020 10:37 PM   Name:  Sarah Castrejon   Age:  80 y.o.  :  1934   MRN:  [id number]   PCP:  Chato Evans MD  Treatment Team: Attending Provider: Pierre Leon MD; Utilization Review: Tanisha Haines RN; Care Manager: Franc eJan [facility]; Consulting Provider: Riki Domínguez MD; Care Manager: Lucy Chen [facility]; Physical Therapist: Alok Flores, MIKE    HPI/Subjective:   Mr. Kacy Calero is a nice 81 y/o WM sent to the ER from home on 7/15 for confusion. He recently underwent right TKA on . Yesterday a friend thought he was confused and dizzy and sent him to the ER. He was in rapid AFib and met sepsis criteria. CXR negative. Cultures collected. He was started on antibiotics and a Cardizem drip and admitted. Ortho consulted. Incision healing well so unlikely the knee. Some purulence noted around suprapubic cath site, cultured and negative so far. Developed mild volume overload and put on IV Lasix. Changed to oral abx on .    : EMILI today, Cr 1.5. Net negative 9L with 3 days of Lasix. All cultures remain negative. Nurse called me back after my initial exam for c/o blue fingertips, but this had resolved by the time I got there (had strong radial pulses b/l, finger tips were mildly cool with good cap refill). Back on 3L O2, was on RA yesterday. Still weak appearing.      No other complaints  Objective:     Patient Vitals for the past 24 hrs:   Temp Pulse Resp BP SpO2   20 0743 97.7 °F (36.5 °C) 100 16 95/65 96 %   20 0742     100 %   20 0300 98.8 °F (37.1 °C) 69 16 98/59 95 %   20 0001 98.4 °F (36.9 °C) 96 16 138/70 93 %   20 2156    126/67    20     94 %   20 1947 98.8 °F (37.1 °C) 85 16 98/64 100 %   20 1525 98.7 °F (37.1 °C) 86 18 104/59 93 %     Oxygen Therapy  O2 Sat (%): 96 % (20 0743)  Pulse via Oximetry: 97 beats per minute (20 0742)  O2 Device: Nasal cannula (07/20/20 0844)  O2 Flow Rate (L/min): 3 l/min (07/20/20 0844)    Estimated body mass index is 32.06 kg/m² as calculated from the following:    Height as of this encounter: 6' 1\" (1.854 m). Weight as of this encounter: 110.2 kg (243 lb). Intake/Output Summary (Last 24 hours) at 7/20/2020 1120  Last data filed at 7/20/2020 0300  Gross per 24 hour   Intake    Output 2450 ml   Net -2450 ml       *Note that automatically entered I/Os may not be accurate; dependent on patient compliance with collection and accurate  by [employer]. General:    Well nourished. Alert. Obese. Weak appearing. CV:   Irreg irreg. No murmur, rub, or gallop. Lungs:   Mild bb rales, improved. 3L O2. Abdomen:   Soft, nontender, nondistended. Suprapubic cath site with improving erythema, some mild purulence noted still. Extremities: Warm and dry. No cyanosis. B/l LE edema, R>L. B/l stasis dermatitis. Cooling brace to right knee. Fingertips have normal color, are cool to touch with good, duvall capillary refill. Radial pulses are 2+ b/l. Skin:     No rashes or jaundice.    Neuro:  No gross focal deficits    Data Reviewed:  I have reviewed all labs, meds, and studies from the last 24 hours:  Recent Results (from the past 24 hour(s))   SARS-COV-2    Collection Time: 07/19/20 12:44 PM   Result Value Ref Range    Specimen source Nasopharyngeal      SARS CoV-2 PENDING    METABOLIC PANEL, BASIC    Collection Time: 07/20/20  4:02 AM   Result Value Ref Range    Sodium 136 136 - 145 mmol/L    Potassium 3.9 3.5 - 5.1 mmol/L    Chloride 99 98 - 107 mmol/L    CO2 31 21 - 32 mmol/L    Anion gap 6 (L) 7 - 16 mmol/L    Glucose 122 (H) 65 - 100 mg/dL    BUN 36 (H) 8 - 23 MG/DL    Creatinine 1.51 (H) 0.8 - 1.5 MG/DL    GFR est AA 57 (L) >60 ml/min/1.73m2    GFR est non-AA 47 (L) >60 ml/min/1.73m2    Calcium 8.4 8.3 - 10.4 MG/DL   CBC WITH AUTOMATED DIFF    Collection Time: 07/20/20  4:02 AM   Result Value Ref Range    WBC 16.1 (H) 4.3 - 11.1 K/uL    RBC 3.15 (L) 4.23 - 5.6 M/uL    HGB 9.6 (L) 13.6 - 17.2 g/dL    HCT 31.1 (L) 41.1 - 50.3 %    MCV 98.7 (H) 79.6 - 97.8 FL    MCH 30.5 26.1 - 32.9 PG    MCHC 30.9 (L) 31.4 - 35.0 g/dL    RDW 13.6 11.9 - 14.6 %    PLATELET 644 256 - 982 K/uL    MPV 8.9 (L) 9.4 - 12.3 FL    ABSOLUTE NRBC 0.00 0.0 - 0.2 K/uL    DF AUTOMATED      NEUTROPHILS 74 43 - 78 %    LYMPHOCYTES 11 (L) 13 - 44 %    MONOCYTES 12 4.0 - 12.0 %    EOSINOPHILS 2 0.5 - 7.8 %    BASOPHILS 0 0.0 - 2.0 %    IMMATURE GRANULOCYTES 1 0.0 - 5.0 %    ABS. NEUTROPHILS 11.9 (H) 1.7 - 8.2 K/UL    ABS. LYMPHOCYTES 1.7 0.5 - 4.6 K/UL    ABS. MONOCYTES 2.0 (H) 0.1 - 1.3 K/UL    ABS. EOSINOPHILS 0.3 0.0 - 0.8 K/UL    ABS. BASOPHILS 0.1 0.0 - 0.2 K/UL    ABS. IMM.  GRANS. 0.1 0.0 - 0.5 K/UL   PLEASE READ & DOCUMENT PPD TEST IN 24 HRS    Collection Time: 07/20/20  9:00 AM   Result Value Ref Range    PPD Negative Negative    mm Induration 0 0 - 5 mm        Current Meds:  Current Facility-Administered Medications   Medication Dose Route Frequency    doxycycline (VIBRAMYCIN) capsule 100 mg  100 mg Oral Q12H    cephALEXin (KEFLEX) capsule 500 mg  500 mg Oral Q6H    sodium chloride (NS) flush 5-40 mL  5-40 mL IntraVENous Q8H    sodium chloride (NS) flush 5-40 mL  5-40 mL IntraVENous PRN    acetaminophen (TYLENOL) tablet 650 mg  650 mg Oral Q4H PRN    HYDROcodone-acetaminophen (NORCO) 5-325 mg per tablet 1 Tab  1 Tab Oral Q4H PRN    naloxone (NARCAN) injection 0.4 mg  0.4 mg IntraVENous PRN    ondansetron (ZOFRAN) injection 4 mg  4 mg IntraVENous Q4H PRN    senna-docusate (PERICOLACE) 8.6-50 mg per tablet 2 Tab  2 Tab Oral DAILY PRN    LORazepam (ATIVAN) tablet 0.5 mg  0.5 mg Oral Q4H PRN    zolpidem (AMBIEN) tablet 5 mg  5 mg Oral QHS PRN    aspirin delayed-release tablet 81 mg  81 mg Oral BID    citalopram (CELEXA) tablet 20 mg  20 mg Oral DAILY    [Held by provider] losartan (COZAAR) tablet 25 mg  25 mg Oral QHS    metoprolol succinate (TOPROL-XL) XL tablet 100 mg  100 mg Oral QHS    pantoprazole (PROTONIX) tablet 40 mg  40 mg Oral ACB    oxyCODONE IR (ROXICODONE) tablet 5 mg  5 mg Oral Q4H PRN    polyethylene glycol (MIRALAX) packet 17 g  17 g Oral DAILY    senna-docusate (PERICOLACE) 8.6-50 mg per tablet 1 Tab  1 Tab Oral DAILY    traZODone (DESYREL) tablet 100 mg  100 mg Oral QHS PRN    metoprolol (LOPRESSOR) injection 5 mg  5 mg IntraVENous Q4H PRN       Other Studies:  No results found for this visit on 07/15/20. Xr Chest Sngl V    Result Date: 7/20/2020  EXAM: CHEST X-RAY, 1 VIEW INDICATION: Shortness of breath COMPARISON: Chest x-ray 7/15/2020 TECHNIQUE: Single AP view of the chest was obtained. FINDINGS: There is some hazy opacification of the left lung base. No pneumothorax is evident. The cardiac silhouette is enlarged. Aortic atherosclerotic consultations. The osseous structures are unremarkable. IMPRESSION: 1. Hazy opacification of the left lung base, which could reflect airspace disease such as atelectasis or infiltrate versus pleural effusion. 2.  Cardiomegaly.       All Micro Results     Procedure Component Value Units Date/Time    CULTURE, BLOOD [[id number]] Collected:  07/15/20 2249    Order Status:  Completed Specimen:  Blood Updated:  07/20/20 0726     Special Requests: --        RIGHT  Antecubital       Culture result: NO GROWTH 5 DAYS       CULTURE, BLOOD [[id number]] Collected:  07/15/20 2334    Order Status:  Completed Specimen:  Blood Updated:  07/20/20 0726     Special Requests: --        RIGHT  HAND       Culture result: NO GROWTH 5 DAYS       CULTURE, Keith Mass STAIN [[id number]] Collected:  07/16/20 1213    Order Status:  Completed Specimen:  Wound from Skin Updated:  07/19/20 0730     Special Requests: SUPRAPUBIC     GRAM STAIN 1 TO 5 WBCS SEEN PER OIF      NO DEFINITE ORGANISM SEEN        Culture result: NO GROWTH 2 DAYS       CULTURE, URINE [[id number]] Collected:  07/15/20 2321    Order Status: Completed Specimen:  Urine Updated:  07/18/20 0837     Special Requests: NO SPECIAL REQUESTS        Culture result:       <10,000 COLONIES/mL MIXED SKIN MOE ISOLATED                SARS-CoV-2 Lab Results  \"Novel Coronavirus\" Test: No results found for: COV2NT   \"Emergent Disease\" Test: No results found for: EDPR  \"SARS-COV-2\" Test: No results found for: XGCOVT  \"Precision Labs\" Test: No results found for: RSLT  Rapid Test: No results found for: COVR         Assessment and Plan:     Hospital Problems as of 7/20/2020 Date Reviewed: 7/13/2020          Codes Class Noted - Resolved POA    EM[facility]) [facility] ICD-10-CM: N17.9  ICD-9-CM: 584.9  7/20/2020 - Present Unknown        Atrial fibrillation with RVR ([facility] ICD-10-CM: I48.91  ICD-9-CM: 427.31  7/16/2020 - Present Yes        * (Principal) Sepsis ([facility] ICD-10-CM: A41.9  ICD-9-CM: 038.9, 995.91  7/16/2020 - Present Yes        Neurogenic bladder ICD-10-CM: N31.9  ICD-9-CM: 596.54  7/16/2020 - Present Yes        Hypertension ICD-10-CM: I10  ICD-9-CM: 401.9  Unknown - Present Yes        GERD (gastroesophageal reflux disease) ICD-10-CM: K21.9  ICD-9-CM: 530.81  Unknown - Present Yes    Overview Signed 3/15/2017  2:59 PM by Dottie wong w/med                   Plan:  # EMILI   - Cr 1.5 with 3d Lasix. Stop today. Hold ARB. Encourage PO fluids. Repeat BMP tomorrow. UOP excellent. # Acute volume overload              - 2/2 sepsis fluids. Stop Lasix with EMILI. Improved. CXR today. # Tachycardia   - I reviewed his telemetry, looks like SR with frequent ectopy. EKG ordered which I will review when done. Need to ensure he's not back in AFib, because if so will need to discuss [facility] with him and his surgeon. He's already beta blocked so could add low dose Cardizem but his SBPs are in the 90s so I will hold on doing that for right now. # Sepsis              - S/p right TKA; X-rays show expected post-op changes.  Incision examined on 7/16 and appears to be healing well.               - Sepsis resolved. - Presumably suprapubic cath site, though all cultures are negative to date. Changed to PO abx on 7/18. # MDD/anxiety              - Celexa; Ativan prn               - Said he wanted to kill himself a few nights ago; denies any further SI/HI with me and during Psych consult so no indication for involuntary commitment.     # AFib RVR              - 2/2 acute illness/sepsis              - SR. Con't PO BB.              - Would not anticoagulate now given post-op and a relatively isolated issue.     # Neurogenic bladder with suprapubic catheter              - Culture NTD     # HTN              - Home meds.     # GERD              - PPI    DC planning/Dispo: Placement.   Diet:  DIET REGULAR  DVT ppx: SCDs    Signed:  Carlos Dow MD

## 2020-07-20 NOTE — PROGRESS NOTES
Nikolai Kyle, NP at bedside with assessment of patient. Verbal orders received to give 1 gram of tylenol PO now and a PRN ordered dose of 0.5 mg of ativan IV now as well.

## 2020-07-20 NOTE — PROGRESS NOTES
Sent to Dr. Mike Pham via perfect serve:  Patient has blue finger tips and complaining of numbness onset on feet and hands. Patient states he has had this happen in the past and took something over the counter to help with it, which helped. Current heart rhythm per TELE is 100 with PAC's.

## 2020-07-20 NOTE — PROGRESS NOTES
Pt resting in bed, watching tv. Assessment completed. During assessment, pt reported knee pain and some chest pain. He described it as \"tingling\" and pointed to right below L breast area. Pt stated this sensation had been going on for about an hr. Advised pt that MD would be notified and he voiced understanding. Oxycodone given for pain. Dr Greg Marks notified regarding pt's report of chest pain. No new orders received. Pt remains on remote tele. Valeta Mehta in place and oxygen via nasal cannula on. Martinez patent and draining. Call light, IS at bedside within reach. Pt aware to call for any needs/concerns. Will continue to monitor.

## 2020-07-20 NOTE — PROGRESS NOTES
Patient up to floor to see patient. Watch patient at this time. No further orders received. Read back and confirmed.

## 2020-07-20 NOTE — PROGRESS NOTES
Problem: Mobility Impaired (Adult and Pediatric)  Goal: *Acute Goals and Plan of Care (Insert Text)  Description: GOALS (1-4 days):  (1.)Mr. Garrett Macias will move from supine to sit and sit to supine  in bed with MINIMAL ASSIST. Met 7/19  (2.)Mr. Garrett Macias will transfer from bed to chair and chair to bed with MINIMAL ASSIST using the least restrictive device. (3.)Mr. Garrett Macias will ambulate with MINIMAL ASSIST for 25 feet with the least restrictive device. (4)Mr. Garrett Macias will increase right knee ROM to 5°-80° and perform HEP with cues and assistance. .  ________________________________________________________________________________________________      PHYSICAL THERAPY: Daily Note and AM 7/20/2020  INPATIENT: PT Visit Days : 4  Payor: In[place]ARE / Plan: [place] / Product Type: Managed Care Medicare /       NAME/AGE/GENDER: Marley Schwarz is a 80 y.o. male   PRIMARY DIAGNOSIS: Sepsis ([facility] [A41.9]  Atrial fibrillation with RVR ([facility] [I48.91] Sepsis ([facility] Sepsis ([facility]       ICD-10: Treatment Diagnosis:    · Generalized Muscle Weakness (M62.81)  · Other abnormalities of gait and mobility (R26.89)   Precaution/Allergies:  Patient has no known allergies. ASSESSMENT:     Mr. Garrett Macias showed improved bed mobility skills, along with improved level of assist for transfers also. pt still is very weak, being more complicated to manage than family or caregivers could provide. This pt needed ice cuff on knee to control edema & aggressive post acute therapy at [facility] or SNF if 9th floor not available. This pt is motivated to work & has good DC plan to home with caregivers. This pt would likely progress more quickly with more intense rehab efforts. 7/20/20 ; pt presented as much weaker, needing more help with bed mobility, transfers & gait. Pt's rate of decline could be offset with more frequent & intense therapy at [facility] or SNF.     This section established at most recent assessment   PROBLEM LIST (Impairments causing functional limitations):  1. Decreased Strength  2. Decreased ADL/Functional Activities  3. Decreased Transfer Abilities  4. Decreased Ambulation Ability/Technique  5. Decreased Balance  6. Increased Pain  7. Decreased Activity Tolerance  8. Increased Fatigue  9. Decreased Flexibility/Joint Mobility  10. Edema/Girth  11. Decreased [place] with Home Exercise Program   INTERVENTIONS PLANNED: (Benefits and precautions of physical therapy have been discussed with the patient.)  1. Balance Exercise  2. Bed Mobility  3. Family Education  4. Gait Training  5. Home Exercise Program (HEP)  6. Range of Motion (ROM)  7. Therapeutic Activites  8. Therapeutic Exercise/Strengthening  9. Transfer Training     TREATMENT PLAN: Frequency/Duration: daily for duration of hospital stay  Rehabilitation Potential For Stated Goals: [facility] (at time of discharge pending progress):    Placement:  rehab  Equipment:    None at this time              HISTORY:   History of Present Injury/Illness (Reason for Referral):  Mr. Azam Sharif is a nice 81 y/o WM sent to the ER from home on 7/15 for confusion. 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 Yesterday a friend thought he was confused and dizzy and sent him to the ER. He was in rapid AFib and met sepsis criteria. CXR negative. Cultures collected. He was started on antibiotics and a Cardizem drip and admitted. Ortho consulted. 7/17: O2 from 2L to 4L. Afebrile overnight. WBCs up a little. He is asking if he can go home today. Overnight nurse changed knee bandage, I reviewed a picture that was taken of the surgical site and there was one circular area of erythema just distal to the knee, lateral side of incision. Had to get Cardizem bolus overnight, back in NSR this morning. Bps good.   Past Medical History/Comorbidities:   Mr. Azam Sharif  has a past medical history of Acute lumbar radiculopathy, Arthritis, Ascending aorta dilatation ([address] Atrial fibrillation with RVR [facility]), Enlarged prostate, Martinez catheter in place, GERD (gastroesophageal reflux disease), High cholesterol, Hypertension, ICH (intracerebral hemorrhage) ([facility] Lumbar stenosis with neurogenic claudication, Other forms of scoliosis, lumbar region, Poor historian, Psychiatric disorder, Seizures ([facility] Tongue lesion, and Urinary frequency. Mr. Samia Meza  has a past surgical history that includes hx other surgical; hx turp (10/20/11); hx heent (8/2012); hx orthopaedic (Right); and hx other surgical.  Social History/Living Environment:   Home Environment: Private residence  # Steps to Enter: 1(4 ft ramp for dog)  One/Two Story Residence: One story  Living Alone: Yes  Support Systems: Family member(s), Friends \ neighbors, Home care staff  Patient Expects to be Discharged to[de-identified] Private residence  Current DME Used/Available at Home: Neisha Jacks, rollator  Prior Level of Function/Work/Activity:  Using RW after tka     Number of Personal Factors/Comorbidities that affect the Plan of Care: 3+: HIGH COMPLEXITY   EXAMINATION:   Most Recent Physical Functioning:   Gross Assessment:          RLE PROM  R Knee Flexion: 90  R Knee Extension: -15            Balance:  Sitting: Intact; Without support  Standing: Impaired; With support(walker) Bed Mobility:  Supine to Sit: Moderate assistance  Sit to Supine: (NT)  Scooting: Moderate assistance       Transfers:  Sit to Stand: Maximum assistance  Stand to Sit: Maximum assistance  Bed to Chair: Maximum assistance(with walker)  Duration: 30 Minutes(extra time to work through activity noted)  Gait:  Right Side Weight Bearing: As tolerated  Speed/Elena: Shuffled; Slow  Step Length: Left shortened;Right shortened  Stance: Right decreased  Gait Abnormalities: Antalgic;Decreased step clearance(flexed posture)  Distance (ft): 5 Feet (ft)  Assistive Device: Walker, rolling  Ambulation - Level of Assistance: Maximum assistance  Interventions: Manual cues; Verbal cues      Body Structures Involved:  1. Bones  2. Joints  3. Muscles  4. Ligaments Body Functions Affected:  1. Movement Related Activities and Participation Affected:  1. Mobility   Number of elements that affect the Plan of Care: 3: MODERATE COMPLEXITY   CLINICAL PRESENTATION:   Presentation: Stable and uncomplicated: LOW COMPLEXITY   CLINICAL DECISION MAKIN[address] [place] [id number] AM-PAC 6 Clicks   Basic Mobility Inpatient Short Form  How much difficulty does the patient currently have. .. Unable A Lot A Little None   1. Turning over in bed (including adjusting bedclothes, sheets and blankets)? [] 1   [x] 2   [] 3   [] 4   2. Sitting down on and standing up from a chair with arms ( e.g., wheelchair, bedside commode, etc.)   [] 1   [x] 2   [] 3   [] 4   3. Moving from lying on back to sitting on the side of the bed? [] 1   [x] 2   [] 3   [] 4   How much help from another person does the patient currently need. .. Total A Lot A Little None   4. Moving to and from a bed to a chair (including a wheelchair)? [] 1   [x] 2   [] 3   [] 4   5. Need to walk in hospital room? [x] 1   [] 2   [] 3   [] 4   6. Climbing 3-5 steps with a railing? [x] 1   [] 2   [] 3   [] 4   © , Trustees of [address] [place] [address], under license to [employer]). All rights reserved      Score:  Initial: 10 Most Recent: X (Date: -- )    Interpretation of Tool:  Represents activities that are increasingly more difficult (i.e. Bed mobility, Transfers, Gait). Medical Necessity:     · Patient is expected to demonstrate progress in   · strength, range of motion, and balance  ·  to   · decrease assistance required with exercises and functional mobility  · . Reason for Services/Other Comments:  · Patient   · continues to require present interventions due to patient's inability to perform exercises and functional mobility independently  · .    Use of outcome tool(s) and clinical judgement create a POC that gives a: Questionable prediction of patient's progress: MODERATE COMPLEXITY            TREATMENT:   (In addition to Assessment/Re-Assessment sessions the following treatments were rendered)   Pre-treatment Symptoms/Complaints:  \" I can't go home ? \"  Pain: Initial: numeric scale  Pain Intensity 1: 4  Pain Location 1: Knee  Pain Orientation 1: Right  Pain Intervention(s) 1: Ambulation/Increased Activity, Cold pack  Post Session: 4/10   assessment  Therapeutic Activity: (  30 Minutes(extra time to work through activity noted) ):  Therapeutic activities including TKA protocol exercises, bed mobility, sit<>stand, standing balance with walker, short distance ambulation with rolling walker to improve mobility, strength, and balance. Required moderate Manual cues; Verbal cues to promote static and dynamic balance in standing. Date:  7/17 Date:  7/19 Date:  7/20   ACTIVITY/EXERCISE AM PM AM PM AM PM   GROUP THERAPY  []  []  []  []  []  []   Ankle Pumps  15a 20  20    Quad Sets  15aa 20  20    Gluteal Sets  15a 20  20    Hip ABd/ADduction  15aa 20aa  20aa    Straight Leg Raises   20aa  20aa    Knee Slides  15aa 20aa  20aa    Short Arc Quads   20aa  20    Long Arc Quads         Chair Slides   20aa  20             B = bilateral; AA = active assistive; A = active; P = passive       Braces/Orthotics/Lines/Etc:   · IV  · arnett catheter  · O2 Device: Nasal [id number]  Treatment/Session Assessment:    · Response to Treatment:  Pt weaker today  · Interdisciplinary Collaboration:   o Registered Nurse  o   · After treatment position/precautions:   o Up in chair  o Bed/Chair-wheels locked  o Call light within reach  o RN notified   · Compliance with Program/Exercises: Will assess as treatment progresses  · Recommendations/Intent for next treatment session: \"Next visit will focus on advancements to more challenging activities and reduction in assistance provided\".   Total Treatment Duration:  PT Patient Time In/Time Out  Time In: 0584  Time Out: [facility]ren Mooring

## 2020-07-20 NOTE — PROGRESS NOTES
I have placed a call into 9th floor rehab to see roughly, what would be his out of pocket cost w/ his ins [facility]) if he was to go to our 9th floor rehab. Mani w/ PT feels this may be a good option. OT consult initiated and message left for their admission team.  Referral sent to 9th floor to eval for admission.

## 2020-07-20 NOTE — PROGRESS NOTES
Perfect serve to Dr. Lyle Gordon.  Monitor room called and his heart rate is 154, pt is stating he feels like he is falling apart and could die Need Callback: NO CALLBACK REQ ORTHO URGENT

## 2020-07-20 NOTE — PROGRESS NOTES
Sanjeev served Dr. Hortensia Cranker the following:  Patient in A-fib. HR has hit a high of 157 per tele then 141. Now went from 125 to 111. Anything you would like me to do for him?

## 2020-07-21 LAB
ANION GAP SERPL CALC-SCNC: 5 MMOL/L (ref 7–16)
APPEARANCE UR: CLEAR
ATRIAL RATE: 106 BPM
BACTERIA URNS QL MICRO: 0 /HPF
BASOPHILS # BLD: 0.1 K/UL (ref 0–0.2)
BASOPHILS NFR BLD: 0 % (ref 0–2)
BILIRUB UR QL: NEGATIVE
BUN SERPL-MCNC: 37 MG/DL (ref 8–23)
CALCIUM SERPL-MCNC: 8.3 MG/DL (ref 8.3–10.4)
CALCULATED R AXIS, ECG10: -19 DEGREES
CALCULATED T AXIS, ECG11: -6 DEGREES
CASTS URNS QL MICRO: 0 /LPF
CHLORIDE SERPL-SCNC: 103 MMOL/L (ref 98–107)
CO2 SERPL-SCNC: 28 MMOL/L (ref 21–32)
COLOR UR: ABNORMAL
CREAT SERPL-MCNC: 1.1 MG/DL (ref 0.8–1.5)
DIAGNOSIS, 93000: NORMAL
DIFFERENTIAL METHOD BLD: ABNORMAL
EOSINOPHIL # BLD: 0.3 K/UL (ref 0–0.8)
EOSINOPHIL NFR BLD: 2 % (ref 0.5–7.8)
EPI CELLS #/AREA URNS HPF: ABNORMAL /HPF
ERYTHROCYTE [DISTWIDTH] IN BLOOD BY AUTOMATED COUNT: 13.5 % (ref 11.9–14.6)
GLUCOSE SERPL-MCNC: 120 MG/DL (ref 65–100)
GLUCOSE UR STRIP.AUTO-MCNC: NEGATIVE MG/DL
HCT VFR BLD AUTO: 31.3 % (ref 41.1–50.3)
HGB BLD-MCNC: 9.7 G/DL (ref 13.6–17.2)
HGB UR QL STRIP: NEGATIVE
IMM GRANULOCYTES # BLD AUTO: 0.1 K/UL (ref 0–0.5)
IMM GRANULOCYTES NFR BLD AUTO: 1 % (ref 0–5)
KETONES UR QL STRIP.AUTO: NEGATIVE MG/DL
LEUKOCYTE ESTERASE UR QL STRIP.AUTO: NEGATIVE
LYMPHOCYTES # BLD: 1.7 K/UL (ref 0.5–4.6)
LYMPHOCYTES NFR BLD: 11 % (ref 13–44)
MCH RBC QN AUTO: 30.6 PG (ref 26.1–32.9)
MCHC RBC AUTO-ENTMCNC: 31 G/DL (ref 31.4–35)
MCV RBC AUTO: 98.7 FL (ref 79.6–97.8)
MONOCYTES # BLD: 2 K/UL (ref 0.1–1.3)
MONOCYTES NFR BLD: 13 % (ref 4–12)
NEUTS SEG # BLD: 11.5 K/UL (ref 1.7–8.2)
NEUTS SEG NFR BLD: 74 % (ref 43–78)
NITRITE UR QL STRIP.AUTO: NEGATIVE
NRBC # BLD: 0 K/UL (ref 0–0.2)
PH UR STRIP: 6.5 [PH] (ref 5–9)
PLATELET # BLD AUTO: 308 K/UL (ref 150–450)
PMV BLD AUTO: 8.8 FL (ref 9.4–12.3)
POTASSIUM SERPL-SCNC: 4 MMOL/L (ref 3.5–5.1)
PROLACTIN SERPL-MCNC: 8.1 NG/ML
PROT UR STRIP-MCNC: ABNORMAL MG/DL
Q-T INTERVAL, ECG07: 330 MS
QRS DURATION, ECG06: 82 MS
QTC CALCULATION (BEZET), ECG08: 438 MS
RBC # BLD AUTO: 3.17 M/UL (ref 4.23–5.6)
RBC #/AREA URNS HPF: ABNORMAL /HPF
SARS COV-2, XPGCVT: NEGATIVE
SODIUM SERPL-SCNC: 136 MMOL/L (ref 136–145)
SOURCE, COVRS: NORMAL
SP GR UR REFRACTOMETRY: 1.02 (ref 1–1.02)
UROBILINOGEN UR QL STRIP.AUTO: 4 EU/DL (ref 0.2–1)
VENTRICULAR RATE, ECG03: 106 BPM
WBC # BLD AUTO: 15.6 K/UL (ref 4.3–11.1)
WBC URNS QL MICRO: ABNORMAL /HPF

## 2020-07-21 PROCEDURE — 97530 THERAPEUTIC ACTIVITIES: CPT

## 2020-07-21 PROCEDURE — 84146 ASSAY OF PROLACTIN: CPT

## 2020-07-21 PROCEDURE — 80048 BASIC METABOLIC PNL TOTAL CA: CPT

## 2020-07-21 PROCEDURE — 74011250636 HC RX REV CODE- 250/636: Performed by: INTERNAL MEDICINE

## 2020-07-21 PROCEDURE — 74011250637 HC RX REV CODE- 250/637: Performed by: INTERNAL MEDICINE

## 2020-07-21 PROCEDURE — 85025 COMPLETE CBC W/AUTO DIFF WBC: CPT

## 2020-07-21 PROCEDURE — 65270000029 HC RM PRIVATE

## 2020-07-21 PROCEDURE — 74011250637 HC RX REV CODE- 250/637: Performed by: NURSE PRACTITIONER

## 2020-07-21 PROCEDURE — 87040 BLOOD CULTURE FOR BACTERIA: CPT

## 2020-07-21 PROCEDURE — 36415 COLL VENOUS BLD VENIPUNCTURE: CPT

## 2020-07-21 PROCEDURE — 81003 URINALYSIS AUTO W/O SCOPE: CPT

## 2020-07-21 PROCEDURE — 94762 N-INVAS EAR/PLS OXIMTRY CONT: CPT

## 2020-07-21 PROCEDURE — 3331090001 HH PPS REVENUE CREDIT

## 2020-07-21 PROCEDURE — 74011000250 HC RX REV CODE- 250: Performed by: INTERNAL MEDICINE

## 2020-07-21 PROCEDURE — 97110 THERAPEUTIC EXERCISES: CPT

## 2020-07-21 PROCEDURE — 77010033678 HC OXYGEN DAILY

## 2020-07-21 PROCEDURE — C8929 TTE W OR WO FOL WCON,DOPPLER: HCPCS

## 2020-07-21 PROCEDURE — 3331090002 HH PPS REVENUE DEBIT

## 2020-07-21 RX ORDER — FACIAL-BODY WIPES
10 EACH TOPICAL DAILY PRN
Status: DISCONTINUED | OUTPATIENT
Start: 2020-07-21 | End: 2020-07-29 | Stop reason: HOSPADM

## 2020-07-21 RX ORDER — BISACODYL 5 MG
5 TABLET, DELAYED RELEASE (ENTERIC COATED) ORAL
Status: COMPLETED | OUTPATIENT
Start: 2020-07-21 | End: 2020-07-21

## 2020-07-21 RX ORDER — IPRATROPIUM BROMIDE AND ALBUTEROL SULFATE 2.5; .5 MG/3ML; MG/3ML
3 SOLUTION RESPIRATORY (INHALATION)
Status: DISCONTINUED | OUTPATIENT
Start: 2020-07-21 | End: 2020-07-21

## 2020-07-21 RX ADMIN — ASPIRIN 81 MG: 81 TABLET, COATED ORAL at 07:50

## 2020-07-21 RX ADMIN — LORAZEPAM 0.5 MG: 0.5 TABLET ORAL at 05:19

## 2020-07-21 RX ADMIN — HYDROCODONE BITARTRATE AND ACETAMINOPHEN 1 TABLET: 5; 325 TABLET ORAL at 07:50

## 2020-07-21 RX ADMIN — TRAZODONE HYDROCHLORIDE 100 MG: 50 TABLET ORAL at 23:31

## 2020-07-21 RX ADMIN — PANTOPRAZOLE SODIUM 40 MG: 40 TABLET, DELAYED RELEASE ORAL at 07:50

## 2020-07-21 RX ADMIN — Medication 10 ML: at 05:10

## 2020-07-21 RX ADMIN — OXYCODONE 5 MG: 5 TABLET ORAL at 05:09

## 2020-07-21 RX ADMIN — HYDROCODONE BITARTRATE AND ACETAMINOPHEN 1 TABLET: 5; 325 TABLET ORAL at 15:28

## 2020-07-21 RX ADMIN — LORAZEPAM 0.5 MG: 0.5 TABLET ORAL at 23:31

## 2020-07-21 RX ADMIN — METOPROLOL SUCCINATE 100 MG: 50 TABLET, EXTENDED RELEASE ORAL at 23:33

## 2020-07-21 RX ADMIN — PERFLUTREN 1 ML: 6.52 INJECTION, SUSPENSION INTRAVENOUS at 09:00

## 2020-07-21 RX ADMIN — DOCUSATE SODIUM 50MG AND SENNOSIDES 8.6MG 2 TABLET: 8.6; 5 TABLET, FILM COATED ORAL at 05:09

## 2020-07-21 RX ADMIN — ACETAMINOPHEN 1000 MG: 500 TABLET, FILM COATED ORAL at 05:09

## 2020-07-21 RX ADMIN — CEPHALEXIN 500 MG: 500 CAPSULE ORAL at 23:31

## 2020-07-21 RX ADMIN — POLYETHYLENE GLYCOL (3350) 17 G: 17 POWDER, FOR SOLUTION ORAL at 07:48

## 2020-07-21 RX ADMIN — CEPHALEXIN 500 MG: 500 CAPSULE ORAL at 12:51

## 2020-07-21 RX ADMIN — ASPIRIN 81 MG: 81 TABLET, COATED ORAL at 17:35

## 2020-07-21 RX ADMIN — OXYCODONE 5 MG: 5 TABLET ORAL at 23:31

## 2020-07-21 RX ADMIN — ONDANSETRON 4 MG: 2 INJECTION INTRAMUSCULAR; INTRAVENOUS at 23:31

## 2020-07-21 RX ADMIN — ACETAMINOPHEN 1000 MG: 500 TABLET, FILM COATED ORAL at 23:31

## 2020-07-21 RX ADMIN — CITALOPRAM HYDROBROMIDE 20 MG: 20 TABLET ORAL at 07:50

## 2020-07-21 RX ADMIN — CEPHALEXIN 500 MG: 500 CAPSULE ORAL at 05:09

## 2020-07-21 RX ADMIN — LORAZEPAM 0.5 MG: 0.5 TABLET ORAL at 15:29

## 2020-07-21 RX ADMIN — DOCUSATE SODIUM 50MG AND SENNOSIDES 8.6MG 1 TABLET: 8.6; 5 TABLET, FILM COATED ORAL at 07:50

## 2020-07-21 RX ADMIN — BISACODYL 5 MG: 5 TABLET, COATED ORAL at 12:48

## 2020-07-21 RX ADMIN — DOXYCYCLINE HYCLATE 100 MG: 100 CAPSULE ORAL at 07:50

## 2020-07-21 RX ADMIN — DOXYCYCLINE HYCLATE 100 MG: 100 CAPSULE ORAL at 23:30

## 2020-07-21 RX ADMIN — Medication 10 ML: at 23:33

## 2020-07-21 NOTE — PROGRESS NOTES
Problem: Mobility Impaired (Adult and Pediatric)  Goal: *Acute Goals and Plan of Care (Insert Text)  Description: GOALS (1-4 days):  (1.)Mr. Cecilio Hoang will move from supine to sit and sit to supine  in bed with MINIMAL ASSIST. Met 7/19  (2.)Mr. Cecilio Hoang will transfer from bed to chair and chair to bed with MINIMAL ASSIST using the least restrictive device. (3.)Mr. Cecilio Hoang will ambulate with MINIMAL ASSIST for 25 feet with the least restrictive device. (4)Mr. Cecilio Hoang will increase right knee ROM to 5°-80° and perform HEP with cues and assistance. .  ________________________________________________________________________________________________      PHYSICAL THERAPY: Daily Note and PM 7/21/2020  INPATIENT: PT Visit Days : 4  Payor: Milena Veloz [place]ICARE / Plan: Joey Brooks OF [place] / Product Type: Managed Care Medicare /       NAME/AGE/GENDER: Jens Sim is a 80 y.o. male   PRIMARY DIAGNOSIS: Sepsis ([facility] [address] [A41.9]  Atrial fibrillation with RVR ([facility] [address] [I48.91] Sepsis ([facility] [address] Sepsis ([facility] [address]       ICD-10: Treatment Diagnosis:    · Generalized Muscle Weakness (M62.81)  · Other abnormalities of gait and mobility (R26.89)   Precaution/Allergies:  Patient has no known allergies. ASSESSMENT:     Mr. Cecilio Hoang showed improved bed mobility skills, along with improved level of assist for transfers also. pt still is very weak, being more complicated to manage than family or caregivers could provide. This pt needed ice cuff on knee to control edema & aggressive post acute therapy at [facility] or SNF if 9th floor not available. This pt is motivated to work & has good DC plan to home with caregivers. This pt would likely progress more quickly with more intense rehab efforts. 7/20/20 ; pt presented as much weaker, needing more help with bed mobility, transfers & gait. Pt's rate of decline could be offset with more frequent & intense therapy at [facility] or SNF.  7/21--Pt supine with head of bed elevated, finished eating breakfast, on contact. Pt had therapist open pears and started eating those. Pt appeared confused as to why therapist was checking on him, as he is going to go to therapy. Pt agreeable to exercises, then stated it takes two people to get him to bedside chair. Pt's bed noted to be in flexed position under knees. Therapist locked lower part of bed into extension. Pt assisted to scoot up in bed so that his feet were not pushing against the foot of the bed. Pt performed exercises in bed, as below. On second entry, pt supine. Pt performed supine to sit to stand. Pt took steps to bedside chair. Pt in bedside chair with needs in reach. This section established at most recent assessment   PROBLEM LIST (Impairments causing functional limitations):  1. Decreased Strength  2. Decreased ADL/Functional Activities  3. Decreased Transfer Abilities  4. Decreased Ambulation Ability/Technique  5. Decreased Balance  6. Increased Pain  7. Decreased Activity Tolerance  8. Increased Fatigue  9. Decreased Flexibility/Joint Mobility  10. Edema/Girth  11. Decreased [place] with Home Exercise Program   INTERVENTIONS PLANNED: (Benefits and precautions of physical therapy have been discussed with the patient.)  1. Balance Exercise  2. Bed Mobility  3. Family Education  4. Gait Training  5. Home Exercise Program (HEP)  6. Range of Motion (ROM)  7. Therapeutic Activites  8. Therapeutic Exercise/Strengthening  9. Transfer Training     TREATMENT PLAN: Frequency/Duration: daily for duration of hospital stay  Rehabilitation Potential For Stated Goals: [facility] (at time of discharge pending progress):    Placement:  rehab  Equipment:    None at this time              HISTORY:   History of Present Injury/Illness (Reason for Referral):  Mr. Azam Sharif is a nice 79 y/o WM sent to the ER from home on 7/15 for confusion. 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 Yesterday a friend thought he was confused and dizzy and sent him to the ER.  He was in rapid AFib and met sepsis criteria. CXR negative. Cultures collected. He was started on antibiotics and a Cardizem drip and admitted. Ortho consulted. 7/17: O2 from 2L to 4L. Afebrile overnight. WBCs up a little. He is asking if he can go home today. Overnight nurse changed knee bandage, I reviewed a picture that was taken of the surgical site and there was one circular area of erythema just distal to the knee, lateral side of incision. Had to get Cardizem bolus overnight, back in NSR this morning. Bps good. Past Medical History/Comorbidities:   Mr. Iveth Beltre  has a past medical history of Acute lumbar radiculopathy, Arthritis, Ascending aorta dilatation ([address] Atrial fibrillation with RVR ([address] Enlarged prostate, Martinez catheter in place, GERD (gastroesophageal reflux disease), High cholesterol, Hypertension, ICH (intracerebral hemorrhage) ([address] Lumbar stenosis with neurogenic claudication, Other forms of scoliosis, lumbar region, Poor historian, Psychiatric disorder, Seizures ([address] Tongue lesion, and Urinary frequency. Mr. Iveth Beltre  has a past surgical history that includes hx other surgical; hx turp (10/20/11); hx heent (8/2012); hx orthopaedic (Right); and hx other surgical.  Social History/Living Environment:   Home Environment: Private residence  # Steps to Enter: 1(4 ft ramp for dog)  One/Two Story Residence: One story  Living Alone: Yes  Support Systems: Family member(s)  Patient Expects to be Discharged to[de-identified] Skilled nursing facility  Current DME Used/Available at Home: karthikeyan Wolfe  Prior Level of Function/Work/Activity:  Using RW after tka     Number of Personal Factors/Comorbidities that affect the Plan of Care: 3+: HIGH COMPLEXITY   EXAMINATION:   Most Recent Physical Functioning:   Gross Assessment:                       Balance:  Sitting: Intact  Standing: Pull to stand; With support  Standing - Static: Fair  Standing - Dynamic : Fair Bed Mobility:  Supine to Sit: Minimum assistance  Scooting: Minimum assistance(with trapeze to scoot up in bed)       Transfers:  Sit to Stand: Moderate assistance;Assist x2  Stand to Sit: Moderate assistance;Assist x2  Bed to Chair: Moderate assistance;Assist x2  Level of Assistance: Moderate assistance;Assist x2  Gait:  Right Side Weight Bearing: As tolerated  Gait Abnormalities: Antalgic  Distance (ft): 8 Feet (ft)(to bedside chair)  Assistive Device: Walker, rolling  Ambulation - Level of Assistance: Minimal assistance;Assist x2      Body Structures Involved:  1. Bones  2. Joints  3. Muscles  4. Ligaments Body Functions Affected:  1. Movement Related Activities and Participation Affected:  1. Mobility   Number of elements that affect the Plan of Care: 3: MODERATE COMPLEXITY   CLINICAL PRESENTATION:   Presentation: Stable and uncomplicated: LOW COMPLEXITY   CLINICAL DECISION MAKING:   [facility] AM-PAC 6 Clicks   Basic Mobility Inpatient Short Form  How much difficulty does the patient currently have. .. Unable A Lot A Little None   1. Turning over in bed (including adjusting bedclothes, sheets and blankets)? [] 1   [x] 2   [] 3   [] 4   2. Sitting down on and standing up from a chair with arms ( e.g., wheelchair, bedside commode, etc.)   [] 1   [x] 2   [] 3   [] 4   3. Moving from lying on back to sitting on the side of the bed? [] 1   [x] 2   [] 3   [] 4   How much help from another person does the patient currently need. .. Total A Lot A Little None   4. Moving to and from a bed to a chair (including a wheelchair)? [] 1   [x] 2   [] 3   [] 4   5. Need to walk in hospital room? [x] 1   [] 2   [] 3   [] 4   6. Climbing 3-5 steps with a railing? [x] 1   [] 2   [] 3   [] 4   © 2007, Trustees of [facility], under license to [employer]. All rights reserved      Score:  Initial: 10 Most Recent: X (Date: -- )    Interpretation of Tool:  Represents activities that are increasingly more difficult (i.e. Bed mobility, Transfers, Gait).     Medical Necessity:     · Patient is expected to demonstrate progress in   · strength, range of motion, and balance  ·  to   · decrease assistance required with exercises and functional mobility  · . Reason for Services/Other Comments:  · Patient   · continues to require present interventions due to patient's inability to perform exercises and functional mobility independently  · . Use of outcome tool(s) and clinical judgement create a POC that gives a: Questionable prediction of patient's progress: MODERATE COMPLEXITY            TREATMENT:   (In addition to Assessment/Re-Assessment sessions the following treatments were rendered)   Pre-treatment Symptoms/Complaints:  \" I can't go home ? \"  Pain: Initial: numeric scale     Post Session: 4/10   Therapeutic Exercise: ():  Exercises per grid below to improve mobility. Therapeutic Activity: (   15 minutes ):  Therapeutic activities including TKA protocol exercises, bed mobility, sit<>stand, standing balance with walker, short distance ambulation with rolling walker to improve mobility, strength, and balance. Required moderate   to promote static and dynamic balance in standing. Date:  7/21 Date:  7/19 Date:  7/20   ACTIVITY/EXERCISE AM PM AM PM AM PM   GROUP THERAPY  []  []  []  []  []  []   Ankle Pumps 20  20  20    Quad Sets 20  20  20    Gluteal Sets 20  20  20    Hip ABd/ADduction 20aa  20aa  20aa    Straight Leg Raises 20aa  20aa  20aa    Knee Slides 20aa  20aa  20aa    Short Arc Quads 20  20aa  21    Long Arc Quads         Chair Slides   20aa  20             B = bilateral; AA = active assistive; A = active; P = passive       Braces/Orthotics/Lines/Etc:   · IV  · arnett catheter  · O2 Device: Nasal [id number]  Treatment/Session Assessment:    · Response to Treatment:  Pt agreeable to get to bedside chair.   · Interdisciplinary Collaboration:   o Physical Therapist  o Registered Nurse  · After treatment position/precautions:   o Up in chair  o Bed/Chair-wheels locked  o Bed in low position  o Call light within reach  o RN notified   · Compliance with Program/Exercises: Will assess as treatment progresses  · Recommendations/Intent for next treatment session: \"Next visit will focus on advancements to more challenging activities and reduction in assistance provided\".   Total Treatment Duration:  PT Patient Time In/Time Out  Time In: 1115  Time Out: Eric Sorensen PT

## 2020-07-21 NOTE — PROGRESS NOTES
Care Management Interventions  PCP Verified by CM: Yes  Mode of Transport at Discharge: BLS  Transition of Care Consult (CM Consult): SNF  [address]: Yes  Partner SNF: Yes  Physical Therapy Consult: Yes  Occupational Therapy Consult: Yes  Current Support Network: Lives Alone  Confirm Follow Up Transport: Other (see comment)(stretcher)  The Plan for Transition of Care is Related to the Following Treatment Goals : return to independent function  The Patient and/or Patient Representative was Provided with a Choice of Provider and Agrees with the Discharge Plan?: Yes  Freedom of Choice List was Provided with Basic Dialogue that Supports the Patient's Individualized Plan of Care/Goals, Treatment Preferences and Shares the Quality Data Associated with the Providers?: Yes  Discharge Location  Discharge Placement: Skilled nursing facility    Patient declined by 9th floor. Patient still wants to go home but will admit he is too weak to go now. He is still asking about 9th floor since they will allow visitors. We dicussed the need for STR in a skilled care. He verbalized understanding that he needs rehab but will not commit to a facility since he hopes to improve enough to go home. He has hired help and gave me her name and number but she would need to be available 24/7 for this to be a realistic goal. He was instructed to talk with her. His granddtr is due to prachi at 3pm today. I hope to meet with w/ her today with him,  in hopes she can help him decide on a NH so they can begin the precert process.

## 2020-07-21 NOTE — PROGRESS NOTES
Patient resting in bed. PIV infusing. Right knee dressing is clean, dry and intact. Ice man in place. Catheter draining diego color urine. Patient ambulates with therapy needing assistance. Complains of knee pain. Pain medication given. Abdomen soft with active bowel sounds. Lung sounds diminished. Bed is low and locked. Call light within reach. Instructed to call for assistance. Verbalized understanding.

## 2020-07-21 NOTE — PROGRESS NOTES
Problem: Mobility Impaired (Adult and Pediatric)  Goal: *Acute Goals and Plan of Care (Insert Text)  Description: GOALS (1-4 days):  (1.)Mr. Shelley Greer will move from supine to sit and sit to supine  in bed with MINIMAL ASSIST. Met 7/19  (2.)Mr. Shelley Greer will transfer from bed to chair and chair to bed with MINIMAL ASSIST using the least restrictive device. (3.)Mr. Shelley Greer will ambulate with MINIMAL ASSIST for 25 feet with the least restrictive device. (4)Mr. Shelley Greer will increase right knee ROM to 5°-80° and perform HEP with cues and assistance. .  ________________________________________________________________________________________________      PHYSICAL THERAPY: Daily Note and AM 7/21/2020  INPATIENT: PT Visit Days : 4  [place] Eliot Banda [place] / Plan[place] Navid Kelly [place] / Product Type: Managed Care Medicare /       NAME/AGE/GENDER: Mildred Serrano is a 80 y.o. male   PRIMARY DIAGNOSIS: Sepsis ([facility] [address] [A41.9]  Atrial fibrillation with RVR ([facility] [address] [I48.91] Sepsis ([facility] [address] Sepsis ([facility] [address]       ICD-10: Treatment Diagnosis:    · Generalized Muscle Weakness (M62.81)  · Other abnormalities of gait and mobility (R26.89)   Precaution/Allergies:  Patient has no known allergies. ASSESSMENT:     Mr. Shelley Greer showed improved bed mobility skills, along with improved level of assist for transfers also. pt still is very weak, being more complicated to manage than family or caregivers could provide. This pt needed ice cuff on knee to control edema & aggressive post acute therapy at [facility] or SNF if 9th floor not available. This pt is motivated to work & has good DC plan to home with caregivers. This pt would likely progress more quickly with more intense rehab efforts. 7/20/20 ; pt presented as much weaker, needing more help with bed mobility, transfers & gait. Pt's rate of decline could be offset with more frequent & intense therapy at [facility] or SNF.  7/21--Pt supine with head of bed elevated, finished eating breakfast, on contact. Pt had therapist open pears and started eating those. Pt appeared confused as to why therapist was checking on him, as he is going to go to therapy. Pt agreeable to exercises, then stated it takes two people to get him to bedside chair. Pt's bed noted to be in flexed position under knees. Therapist locked lower part of bed into extension. Pt assisted to scoot up in bed so that his feet were not pushing against the foot of the bed. Pt performed exercises in bed, as below. This section established at most recent assessment   PROBLEM LIST (Impairments causing functional limitations):  1. Decreased Strength  2. Decreased ADL/Functional Activities  3. Decreased Transfer Abilities  4. Decreased Ambulation Ability/Technique  5. Decreased Balance  6. Increased Pain  7. Decreased Activity Tolerance  8. Increased Fatigue  9. Decreased Flexibility/Joint Mobility  10. Edema/Girth  11. Decreased [place] with Home Exercise Program   INTERVENTIONS PLANNED: (Benefits and precautions of physical therapy have been discussed with the patient.)  1. Balance Exercise  2. Bed Mobility  3. Family Education  4. Gait Training  5. Home Exercise Program (HEP)  6. Range of Motion (ROM)  7. Therapeutic Activites  8. Therapeutic Exercise/Strengthening  9. Transfer Training     TREATMENT PLAN: Frequency/Duration: daily for duration of hospital stay  Rehabilitation Potential For Stated Goals: [facility] (at time of discharge pending progress):    Placement:  rehab  Equipment:    None at this time              HISTORY:   History of Present Injury/Illness (Reason for Referral):  Mr. Nathaniel Kellogg is a nice 81 y/o WM sent to the ER from home on 7/15 for confusion. 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 Yesterday a friend thought he was confused and dizzy and sent him to the ER. He was in rapid AFib and met sepsis criteria. CXR negative. Cultures collected. He was started on antibiotics and a Cardizem drip and admitted.  Ortho consulted. 7/17: O2 from 2L to 4L. Afebrile overnight. WBCs up a little. He is asking if he can go home today. Overnight nurse changed knee bandage, I reviewed a picture that was taken of the surgical site and there was one circular area of erythema just distal to the knee, lateral side of incision. Had to get Cardizem bolus overnight, back in NSR this morning. Bps good. Past Medical History/Comorbidities:   Mr. Constance Pearson  has a past medical history of Acute lumbar radiculopathy, Arthritis, Ascending aorta dilatation ([address] Atrial fibrillation with RVR ([address] Enlarged prostate, Martinez catheter in place, GERD (gastroesophageal reflux disease), High cholesterol, Hypertension, ICH (intracerebral hemorrhage) ([address] Lumbar stenosis with neurogenic claudication, Other forms of scoliosis, lumbar region, Poor historian, Psychiatric disorder, Seizures ([address] Tongue lesion, and Urinary frequency. Mr. Constance Pearson  has a past surgical history that includes hx other surgical; hx turp (10/20/11); hx heent (8/2012); hx orthopaedic (Right); and hx other surgical.  Social History/Living Environment:   Home Environment: Private residence  # Steps to Enter: 1(4 ft ramp for dog)  One/Two Story Residence: One story  Living Alone: Yes  Support Systems: Family member(s)  Patient Expects to be Discharged to[de-identified] Skilled nursing facility  Current DME Used/Available at Home: Lorilee Pipes, rollator  Prior Level of Function/Work/Activity:  Using RW after tka     Number of Personal Factors/Comorbidities that affect the Plan of Care: 3+: HIGH COMPLEXITY   EXAMINATION:   Most Recent Physical Functioning:   Gross Assessment:                       Balance:    Bed Mobility:  Scooting: Minimum assistance(with trapeze to scoot up in bed)       Transfers:     Gait:            Body Structures Involved:  1. Bones  2. Joints  3. Muscles  4. Ligaments Body Functions Affected:  1. Movement Related Activities and Participation Affected:  1.  Mobility   Number of elements that affect the Plan of Care: 3: MODERATE COMPLEXITY   CLINICAL PRESENTATION:   Presentation: Stable and uncomplicated: LOW COMPLEXITY   CLINICAL DECISION MAKIN [facility] [address] [id number] AM-PAC 6 Clicks   Basic Mobility Inpatient Short Form  How much difficulty does the patient currently have. .. Unable A Lot A Little None   1. Turning over in bed (including adjusting bedclothes, sheets and blankets)? [] 1   [x] 2   [] 3   [] 4   2. Sitting down on and standing up from a chair with arms ( e.g., wheelchair, bedside commode, etc.)   [] 1   [x] 2   [] 3   [] 4   3. Moving from lying on back to sitting on the side of the bed? [] 1   [x] 2   [] 3   [] 4   How much help from another person does the patient currently need. .. Total A Lot A Little None   4. Moving to and from a bed to a chair (including a wheelchair)? [] 1   [x] 2   [] 3   [] 4   5. Need to walk in hospital room? [x] 1   [] 2   [] 3   [] 4   6. Climbing 3-5 steps with a railing? [x] 1   [] 2   [] 3   [] 4   © , Trustees of [address] [place] [address] [address] [address], under license to [employer]. All rights reserved      Score:  Initial: 10 Most Recent: X (Date: -- )    Interpretation of Tool:  Represents activities that are increasingly more difficult (i.e. Bed mobility, Transfers, Gait). Medical Necessity:     · Patient is expected to demonstrate progress in   · strength, range of motion, and balance  ·  to   · decrease assistance required with exercises and functional mobility  · . Reason for Services/Other Comments:  · Patient   · continues to require present interventions due to patient's inability to perform exercises and functional mobility independently  · .    Use of outcome tool(s) and clinical judgement create a POC that gives a: Questionable prediction of patient's progress: MODERATE COMPLEXITY            TREATMENT:   (In addition to Assessment/Re-Assessment sessions the following treatments were rendered)   Pre-treatment Symptoms/Complaints:  \" I can't go home ? \"  Pain: Initial: numeric scale     Post Session: 4/10   assessment  Therapeutic Activity: (    ):  Therapeutic activities including TKA protocol exercises, bed mobility, sit<>stand, standing balance with walker, short distance ambulation with rolling walker to improve mobility, strength, and balance. Required moderate   to promote static and dynamic balance in standing. Date:  7/21 Date:  7/19 Date:  7/20   ACTIVITY/EXERCISE AM PM AM PM AM PM   GROUP THERAPY  []  []  []  []  []  []   Ankle Pumps 20  20  20    Quad Sets 20  20  20    Gluteal Sets 20  20  20    Hip ABd/ADduction 20aa  20aa  20aa    Straight Leg Raises 20aa  20aa  20aa    Knee Slides 20aa  20aa  20aa    Short Arc Quads 20  20aa  21    Long Arc Quads         Chair Slides   20aa  20             B = bilateral; AA = active assistive; A = active; P = passive       Braces/Orthotics/Lines/Etc:   · IV  · arnett catheter  · O2 Device: Nasal [id number]  Treatment/Session Assessment:    · Response to Treatment:  Pt agreeable to exercise. · Interdisciplinary Collaboration:   o Physical Therapist  o Registered Nurse  · After treatment position/precautions:   o Supine in bed  o Bed/Chair-wheels locked  o Bed in low position  o Call light within reach  o RN notified   · Compliance with Program/Exercises: Will assess as treatment progresses  · Recommendations/Intent for next treatment session: \"Next visit will focus on advancements to more challenging activities and reduction in assistance provided\".   Total Treatment Duration:  PT Patient Time In/Time Out  Time In: 0850  Time Out: DAMIEN Jimenez 1726, PT

## 2020-07-21 NOTE — PROGRESS NOTES
Dr. Lorri Motta notified of patient condition. Another 500 ml NS bolus ordered. Tylenol given at 2116. Will reassess patient every 30 min until MEWS score drops per protocol.

## 2020-07-21 NOTE — PROGRESS NOTES
Name: Ama Dunbar MRN: [id number]  : 1934  Age:85 y.o.  male  Admit Date:  7/15/2020 LOS: 5      Hospitalist Progress Note    Ama Dunbar is a 80 y.o. male with medical history significant for major depressive disorder/anxiety, neurogenic bladder with suprapubic catheter, hypertension, GERD who was admitted from home on 7/15 for confusion. Patient recently underwent right total knee arthroplasty on . Patient was found to be confused/dizzy by a friend on  then sent him to the ED. Patient was found to have A. fib RVR on arrival and met sepsis criteria. CXR is negative. She was recollected and patient was started on IV antibiotics. Ortho was consulted. Right TKA incision site appears to be healing well therefore it is unlikely the source of sepsis. There was noted to have some purulence around suprapubic catheter but culture has been negative to date. Patient appeared to develop mild volume overload and started on IV Lasix. He noted to have EMILI with elevation of creatinine on . Cultures have been negative to date. Subjective (20) : Patient is seen and examined at bedside. Febrile overnight with Tmax of 101.7F. Complains of constipation. He reports no bowel movement since his knee operation. Reports having flatus. Patient denies fever, chills, chest pains, shortness of breath, n/v, abdominal pain. ROS: 10 point review of systems is otherwise negative with the exception of the elements mentioned above.     Objective:    Patient Vitals for the past 24 hrs:   Temp Pulse Resp BP SpO2   20 1055 99 °F (37.2 °C) 99  110/79 96 %   20 0924     94 %   20 0709 99.7 °F (37.6 °C) 100 18 117/76 96 %   20 2243 100 °F (37.8 °C) (!) 125 18 114/74 94 %   20 2203 (!) 101.5 °F (38.6 °C) (!) 116 18 119/71 91 %   20 2116 (!) 101.7 °F (38.7 °C) (!) 137 20 103/63 94 %   20 1855  (!) 155  (!) 88/44    20 1533  79  110/65 97 % 07/20/20 1433  60  106/56      Oxygen Therapy  O2 Sat (%): 96 % (07/21/20 1055)  Pulse via Oximetry: 102 beats per minute (07/21/20 0924)  O2 Device: Nasal cannula (07/21/20 0924)  O2 Flow Rate (L/min): 2 l/min (07/21/20 0924)    Estimated body mass index is 32.06 kg/m² as calculated from the following:    Height as of this encounter: 6' 1\" (1.854 m). Weight as of this encounter: 110.2 kg (243 lb). Intake/Output Summary (Last 24 hours) at 7/21/2020 1425  Last data filed at 7/21/2020 1132  Gross per 24 hour   Intake    Output 1400 ml   Net -1400 ml       *Note that automatically entered I/Os may not be accurate; dependent on patient compliance with collection and accurate  by techs. Physical Exam:   General:     alert, awake, no acute distress. Well nourished. Obese  Head:   normocephalic, atraumatic  Eyes, Ears, nose: PERRL, EOMI. Normal conjunctiva  Neck:    supple, non-tender. Trachea midline. Lungs:   Bibasilar crackles, no wheezing  Cardiac:   Irregularly irregular, Normal S1 and S2. Abdomen:   Soft, non distended, nontender, +BS  Extremities:   Warm, dry. B/l edema (R>L) with stasis dermatitis skin changes. Cooling brace to right knee. Skin:   No rashes, no jaundice  Neuro:  AAOx3.  No gross focal neurological deficit  Psychiatric:  No anxiety, calm, cooperative    Data Review:  I have reviewed all labs, meds, and studies from the last 24 hours:    Recent Results (from the past 24 hour(s))   METABOLIC PANEL, BASIC    Collection Time: 07/21/20  3:48 AM   Result Value Ref Range    Sodium 136 136 - 145 mmol/L    Potassium 4.0 3.5 - 5.1 mmol/L    Chloride 103 98 - 107 mmol/L    CO2 28 21 - 32 mmol/L    Anion gap 5 (L) 7 - 16 mmol/L    Glucose 120 (H) 65 - 100 mg/dL    BUN 37 (H) 8 - 23 MG/DL    Creatinine 1.10 0.8 - 1.5 MG/DL    GFR est AA >60 >60 ml/min/1.73m2    GFR est non-AA >60 >60 ml/min/1.73m2    Calcium 8.3 8.3 - 10.4 MG/DL   CBC WITH AUTOMATED DIFF    Collection Time: 07/21/20  3:48 AM   Result Value Ref Range    WBC 15.6 (H) 4.3 - 11.1 K/uL    RBC 3.17 (L) 4.23 - 5.6 M/uL    HGB 9.7 (L) 13.6 - 17.2 g/dL    HCT 31.3 (L) 41.1 - 50.3 %    MCV 98.7 (H) 79.6 - 97.8 FL    MCH 30.6 26.1 - 32.9 PG    MCHC 31.0 (L) 31.4 - 35.0 g/dL    RDW 13.5 11.9 - 14.6 %    PLATELET 561 585 - 426 K/uL    MPV 8.8 (L) 9.4 - 12.3 FL    ABSOLUTE NRBC 0.00 0.0 - 0.2 K/uL    DF AUTOMATED      NEUTROPHILS 74 43 - 78 %    LYMPHOCYTES 11 (L) 13 - 44 %    MONOCYTES 13 (H) 4.0 - 12.0 %    EOSINOPHILS 2 0.5 - 7.8 %    BASOPHILS 0 0.0 - 2.0 %    IMMATURE GRANULOCYTES 1 0.0 - 5.0 %    ABS. NEUTROPHILS 11.5 (H) 1.7 - 8.2 K/UL    ABS. LYMPHOCYTES 1.7 0.5 - 4.6 K/UL    ABS. MONOCYTES 2.0 (H) 0.1 - 1.3 K/UL    ABS. EOSINOPHILS 0.3 0.0 - 0.8 K/UL    ABS. BASOPHILS 0.1 0.0 - 0.2 K/UL    ABS. IMM.  GRANS. 0.1 0.0 - 0.5 K/UL        All Micro Results     Procedure Component Value Units Date/Time    CULTURE, BLOOD [[id number]] Collected:  07/15/20 2249    Order Status:  Completed Specimen:  Blood Updated:  07/20/20 0726     Special Requests: --        RIGHT  Antecubital       Culture result: NO GROWTH 5 DAYS       CULTURE, BLOOD [[id number]] Collected:  07/15/20 2334    Order Status:  Completed Specimen:  Blood Updated:  07/20/20 0726     Special Requests: --        RIGHT  HAND       Culture result: NO GROWTH 5 DAYS       CULTURE, Frankie Solano STAIN [[id number]] Collected:  07/16/20 1213    Order Status:  Completed Specimen:  Wound from Skin Updated:  07/19/20 0730     Special Requests: SUPRAPUBIC     GRAM STAIN 1 TO 5 WBCS SEEN PER OIF      NO DEFINITE ORGANISM SEEN        Culture result: NO GROWTH 2 DAYS       CULTURE, URINE [[id number]] Collected:  07/15/20 2321    Order Status:  Completed Specimen:  Urine Updated:  07/18/20 0837     Special Requests: NO SPECIAL REQUESTS        Culture result:       <10,000 COLONIES/mL MIXED SKIN MOE ISOLATED                Current Meds:  Current Facility-Administered Medications Medication Dose Route Frequency    bisacodyL (DULCOLAX) suppository 10 mg  10 mg Rectal DAILY PRN    cephALEXin (KEFLEX) capsule 500 mg  500 mg Oral Q8H    acetaminophen (TYLENOL) tablet 1,000 mg  1,000 mg Oral Q6H PRN    doxycycline (VIBRAMYCIN) capsule 100 mg  100 mg Oral Q12H    sodium chloride (NS) flush 5-40 mL  5-40 mL IntraVENous Q8H    sodium chloride (NS) flush 5-40 mL  5-40 mL IntraVENous PRN    HYDROcodone-acetaminophen (NORCO) 5-325 mg per tablet 1 Tab  1 Tab Oral Q4H PRN    naloxone (NARCAN) injection 0.4 mg  0.4 mg IntraVENous PRN    ondansetron (ZOFRAN) injection 4 mg  4 mg IntraVENous Q4H PRN    senna-docusate (PERICOLACE) 8.6-50 mg per tablet 2 Tab  2 Tab Oral DAILY PRN    LORazepam (ATIVAN) tablet 0.5 mg  0.5 mg Oral Q4H PRN    zolpidem (AMBIEN) tablet 5 mg  5 mg Oral QHS PRN    aspirin delayed-release tablet 81 mg  81 mg Oral BID    citalopram (CELEXA) tablet 20 mg  20 mg Oral DAILY    [Held by provider] losartan (COZAAR) tablet 25 mg  25 mg Oral QHS    metoprolol succinate (TOPROL-XL) XL tablet 100 mg  100 mg Oral QHS    pantoprazole (PROTONIX) tablet 40 mg  40 mg Oral ACB    oxyCODONE IR (ROXICODONE) tablet 5 mg  5 mg Oral Q4H PRN    polyethylene glycol (MIRALAX) packet 17 g  17 g Oral DAILY    senna-docusate (PERICOLACE) 8.6-50 mg per tablet 1 Tab  1 Tab Oral DAILY    traZODone (DESYREL) tablet 100 mg  100 mg Oral QHS PRN    metoprolol (LOPRESSOR) injection 5 mg  5 mg IntraVENous Q4H PRN         Other Studies:  Xr Chest Port    Result Date: 7/15/2020  EXAM: Chest x-ray. INDICATION: Dyspnea. COMPARISON: None. TECHNIQUE: Single frontal view. FINDINGS: The lungs are clear. The heart is enlarged. Mediastinal contour and pulmonary vasculature are within normal limits. No pneumothorax or pleural effusion is seen. IMPRESSION: 1. No acute process. 2. Cardiomegaly.     Xr Knee Rt 3 V    Result Date: 7/16/2020  Portable right knee  7/16/2020 1:20 PM Indication: Patient immediately status post TKA Comparison: None available at this hospital PACS Findings: Portable AP, oblique and crosstable lateral views from 1246 are submitted. There is an anteriorly located vertically oriented skin staple line. There is soft tissues swelling and trace collections of air in the soft tissues and joints. No unexpected radiopaque foreign body. TKA prosthesis components appear in appropriate alignment with no acute complicating features. No bony fracture. Impression: Expected immediate postop TKA findings. No acute complicating features. Duplex Lower Ext Venous Right    Result Date: 7/16/2020  RIGHT LOWER EXTREMITY DEEP VENOUS SONOGRAPHY: CLINICAL HISTORY: Leg pain and swelling after recent right knee surgery. FINDINGS: Multiple images from real time ultrasound evaluation of the deep venous system of the right leg demonstrate normal venous flow in the posterior tibial, popliteal, and superficial and common femoral veins. Normal compressibility was demonstrated. Flow was also documented in the proximal saphenous and deep femoral veins. No intraluminal echogenic material was seen to suggest the presence of nonobstructive thrombus. IMPRESSION: NO ULTRASOUND EVIDENCE OF DEEP VENOUS THROMBOSIS IN THE RIGHT LEG. Assessment and Plan: Active Hospital Problems    Diagnosis Date Noted    EMILI (acute kidney injury) ([address] 07/20/2020    Atrial fibrillation with RVR ([address] 07/16/2020    Sepsis ([facility] 07/16/2020    Neurogenic bladder 07/16/2020    GERD (gastroesophageal reflux disease)      controlled w/med      Hypertension      Plan:    EMILI  Increased to 1.51 and now back down to 1.10 today. Baseline creatinine of 0.98  - hold ACEi and lasix  - monitor    Sepsis likely from suprapubic cath site  All cultures have been neg to date.  Febrile episode with Tmax of 100.7 on 7/20.  - on keflex 500mg q8h till 7/24  - if febrile episode, will need to re-culture  - UA, repeat Cultures  - repeat CXR    Hypoxia likely due to underlying sepsis and volume overload  S/p lasix IV with -11L of net fluid balance  - O2 supplement and wean as tolerated    Afib RVR (resolved)  Likely due to acute illness/sepsis. Now sinus rhythm with possible PVC or aberrant complexes  - continue with metoprolol XL 100mg qHS  - no AC for now given recent procedure     MDD/Anxiety  - continue with celexa, PRN ativan. - per notes, he had suicidal ideation a few nights ago but he has denied any SI to me and during psych consult    Hypertension: continue with home medications  GERD: continue with PPI    Diet:  DIET REGULAR  DVT PPx:   Code: Full Code  Dispo: SNF  Estimated Discharge: TBD based on clinical course    Labs/Imaging Reviewed. Patient is high risk due to current condition and comorbid conditions as well as requiring frequent monitoring. Plan discussed with staff, patient/family and are in agreement. Time Spent: Greater than 45 minutes was spent in reviewing charts, physical exam, discussion with patient, and answered any questions.     Signed By: Tressa Brito MD     July 21, 2020

## 2020-07-22 ENCOUNTER — HOME CARE VISIT (OUTPATIENT)
Dept: SCHEDULING | Facility: HOME HEALTH | Age: 85
End: 2020-07-22
Payer: MEDICARE

## 2020-07-22 ENCOUNTER — APPOINTMENT (OUTPATIENT)
Dept: GENERAL RADIOLOGY | Age: 85
DRG: 698 | End: 2020-07-22
Attending: INTERNAL MEDICINE
Payer: MEDICARE

## 2020-07-22 LAB
ANION GAP SERPL CALC-SCNC: 3 MMOL/L (ref 7–16)
BASOPHILS # BLD: 0.1 K/UL (ref 0–0.2)
BASOPHILS NFR BLD: 1 % (ref 0–2)
BUN SERPL-MCNC: 31 MG/DL (ref 8–23)
CALCIUM SERPL-MCNC: 8.9 MG/DL (ref 8.3–10.4)
CHLORIDE SERPL-SCNC: 105 MMOL/L (ref 98–107)
CO2 SERPL-SCNC: 29 MMOL/L (ref 21–32)
CREAT SERPL-MCNC: 0.94 MG/DL (ref 0.8–1.5)
DIFFERENTIAL METHOD BLD: ABNORMAL
EOSINOPHIL # BLD: 0.3 K/UL (ref 0–0.8)
EOSINOPHIL NFR BLD: 2 % (ref 0.5–7.8)
ERYTHROCYTE [DISTWIDTH] IN BLOOD BY AUTOMATED COUNT: 13.7 % (ref 11.9–14.6)
GLUCOSE SERPL-MCNC: 121 MG/DL (ref 65–100)
HCT VFR BLD AUTO: 30.6 % (ref 41.1–50.3)
HGB BLD-MCNC: 9.5 G/DL (ref 13.6–17.2)
IMM GRANULOCYTES # BLD AUTO: 0.1 K/UL (ref 0–0.5)
IMM GRANULOCYTES NFR BLD AUTO: 1 % (ref 0–5)
LYMPHOCYTES # BLD: 1.8 K/UL (ref 0.5–4.6)
LYMPHOCYTES NFR BLD: 11 % (ref 13–44)
MCH RBC QN AUTO: 30.6 PG (ref 26.1–32.9)
MCHC RBC AUTO-ENTMCNC: 31 G/DL (ref 31.4–35)
MCV RBC AUTO: 98.7 FL (ref 79.6–97.8)
MONOCYTES # BLD: 1.7 K/UL (ref 0.1–1.3)
MONOCYTES NFR BLD: 10 % (ref 4–12)
NEUTS SEG # BLD: 12.7 K/UL (ref 1.7–8.2)
NEUTS SEG NFR BLD: 76 % (ref 43–78)
NRBC # BLD: 0 K/UL (ref 0–0.2)
PLATELET # BLD AUTO: 337 K/UL (ref 150–450)
PMV BLD AUTO: 8.8 FL (ref 9.4–12.3)
POTASSIUM SERPL-SCNC: 4.2 MMOL/L (ref 3.5–5.1)
PROCALCITONIN SERPL-MCNC: <0.05 NG/ML
RBC # BLD AUTO: 3.1 M/UL (ref 4.23–5.6)
SODIUM SERPL-SCNC: 137 MMOL/L (ref 136–145)
WBC # BLD AUTO: 16.7 K/UL (ref 4.3–11.1)

## 2020-07-22 PROCEDURE — 3331090001 HH PPS REVENUE CREDIT

## 2020-07-22 PROCEDURE — 84145 PROCALCITONIN (PCT): CPT

## 2020-07-22 PROCEDURE — 74011000258 HC RX REV CODE- 258: Performed by: INTERNAL MEDICINE

## 2020-07-22 PROCEDURE — 74011250636 HC RX REV CODE- 250/636: Performed by: INTERNAL MEDICINE

## 2020-07-22 PROCEDURE — 74011250637 HC RX REV CODE- 250/637: Performed by: INTERNAL MEDICINE

## 2020-07-22 PROCEDURE — 77010033678 HC OXYGEN DAILY

## 2020-07-22 PROCEDURE — 36415 COLL VENOUS BLD VENIPUNCTURE: CPT

## 2020-07-22 PROCEDURE — 3331090002 HH PPS REVENUE DEBIT

## 2020-07-22 PROCEDURE — 65270000029 HC RM PRIVATE

## 2020-07-22 PROCEDURE — 85025 COMPLETE CBC W/AUTO DIFF WBC: CPT

## 2020-07-22 PROCEDURE — 97530 THERAPEUTIC ACTIVITIES: CPT

## 2020-07-22 PROCEDURE — 71045 X-RAY EXAM CHEST 1 VIEW: CPT

## 2020-07-22 PROCEDURE — 80048 BASIC METABOLIC PNL TOTAL CA: CPT

## 2020-07-22 PROCEDURE — 94762 N-INVAS EAR/PLS OXIMTRY CONT: CPT

## 2020-07-22 PROCEDURE — 97116 GAIT TRAINING THERAPY: CPT

## 2020-07-22 PROCEDURE — 74011000250 HC RX REV CODE- 250: Performed by: INTERNAL MEDICINE

## 2020-07-22 PROCEDURE — 94760 N-INVAS EAR/PLS OXIMETRY 1: CPT

## 2020-07-22 RX ORDER — VANCOMYCIN 1.75 GRAM/500 ML IN 0.9 % SODIUM CHLORIDE INTRAVENOUS
1750 EVERY 12 HOURS
Status: DISCONTINUED | OUTPATIENT
Start: 2020-07-22 | End: 2020-07-24 | Stop reason: SDUPTHER

## 2020-07-22 RX ORDER — VANCOMYCIN 2 GRAM/500 ML IN 0.9 % SODIUM CHLORIDE INTRAVENOUS
2000 ONCE
Status: COMPLETED | OUTPATIENT
Start: 2020-07-22 | End: 2020-07-22

## 2020-07-22 RX ADMIN — VANCOMYCIN HYDROCHLORIDE 2000 MG: 10 INJECTION, POWDER, LYOPHILIZED, FOR SOLUTION INTRAVENOUS at 10:21

## 2020-07-22 RX ADMIN — VANCOMYCIN HYDROCHLORIDE 1750 MG: 10 INJECTION, POWDER, LYOPHILIZED, FOR SOLUTION INTRAVENOUS at 21:07

## 2020-07-22 RX ADMIN — DOCUSATE SODIUM 50MG AND SENNOSIDES 8.6MG 1 TABLET: 8.6; 5 TABLET, FILM COATED ORAL at 08:05

## 2020-07-22 RX ADMIN — ASPIRIN 81 MG: 81 TABLET, COATED ORAL at 08:05

## 2020-07-22 RX ADMIN — Medication 5 ML: at 21:08

## 2020-07-22 RX ADMIN — METOPROLOL TARTRATE 5 MG: 5 INJECTION INTRAVENOUS at 16:13

## 2020-07-22 RX ADMIN — LORAZEPAM 0.5 MG: 0.5 TABLET ORAL at 07:55

## 2020-07-22 RX ADMIN — PIPERACILLIN AND TAZOBACTAM 3.38 G: 3; .375 INJECTION, POWDER, LYOPHILIZED, FOR SOLUTION INTRAVENOUS at 08:02

## 2020-07-22 RX ADMIN — HYDROCODONE BITARTRATE AND ACETAMINOPHEN 1 TABLET: 5; 325 TABLET ORAL at 07:55

## 2020-07-22 RX ADMIN — METOPROLOL SUCCINATE 100 MG: 50 TABLET, EXTENDED RELEASE ORAL at 21:08

## 2020-07-22 RX ADMIN — ASPIRIN 81 MG: 81 TABLET, COATED ORAL at 17:04

## 2020-07-22 RX ADMIN — PIPERACILLIN AND TAZOBACTAM 3.38 G: 3; .375 INJECTION, POWDER, LYOPHILIZED, FOR SOLUTION INTRAVENOUS at 16:13

## 2020-07-22 RX ADMIN — HYDROCODONE BITARTRATE AND ACETAMINOPHEN 1 TABLET: 5; 325 TABLET ORAL at 17:04

## 2020-07-22 RX ADMIN — ZOLPIDEM TARTRATE 5 MG: 5 TABLET ORAL at 02:08

## 2020-07-22 RX ADMIN — PANTOPRAZOLE SODIUM 40 MG: 40 TABLET, DELAYED RELEASE ORAL at 08:05

## 2020-07-22 RX ADMIN — CITALOPRAM HYDROBROMIDE 20 MG: 20 TABLET ORAL at 08:05

## 2020-07-22 NOTE — PROGRESS NOTES
Nutrition Assessment     Type and Reason for Visit: RD nutrition re-screen/LOS, Initial      Nutrition Assessment:   Nutrition History and Allergies: H/O major depressive disorder/anxiety, neurogenic bladder with suprapubic catheter, hypertension, GERD. Right knee arthroplasty on 7/13 and was sent home with home rehab. Presented with confusion/shaking, dizzy. Findings of  A. fib RVR on arrival and met sepsis criteria, likely from surapubic catheter. Pt says he is eating, but he doesn't want to. He says since he has been sick for the past week he has not felt like eating, but still does because he knows he needs his strength up to get better. Per RD meal rounds, pt consumed 100% of breakfast this morning. Average documented intake: 85% of 2 recorded meal  in 6 days. Estimated Daily Nutrient Needs:  Energy (kcal):  5574-8433 kcal/d (15-18 kcal/kg of UBW of 104.5kg/230#)  Protein (g):  78-94 grams/d (20% of kcal)         Current Nutrition Therapies:  DIET REGULAR    Anthropometric Measures:  · Height:  6' 1\" (185.4 cm)  · Current Body Wt:   243#, UBW per EMR: 228-233#  · BMI:  30.7 to 32.6 for UBW.  Class II obesity    Nutrition Diagnosis:   No nutritional diagnosis at this time   Nutrition Intervention:  Food and/or Nutrient Delivery: Continue current diet    Discharge Planning:    No discharge needs at this time     Electronically signed by Anthony Zheng RD on 7/22/2020 at 12:31 PM    Contact Number: [phone]

## 2020-07-22 NOTE — PROGRESS NOTES
Care Management Interventions  PCP Verified by CM: Yes  Mode of Transport at Discharge: BLS  Transition of Care Consult (CM Consult): SNF  [facility] - INPATIENT: Yes  Partner SNF: Yes  Physical Therapy Consult: Yes  Occupational Therapy Consult: Yes  Current Support Network: Lives Alone  Confirm Follow Up Transport: Other (see comment)(stretcher)  The Plan for Transition of Care is Related to the Following Treatment Goals : return to independent function  The Patient and/or Patient Representative was Provided with a Choice of Provider and Agrees with the Discharge Plan?: Yes  Freedom of Choice List was Provided with Basic Dialogue that Supports the Patient's Individualized Plan of Care/Goals, Treatment Preferences and Shares the Quality Data Associated with the Providers?: Yes  Discharge Location  Discharge Placement: Skilled nursing facility    Patient reluctantly agreeable to rehab and has requested \"the small NH in [place]). He still would take the offer going to 9th floor rehab if ever an option. He doesn't like that the [facility] do not allow visitors due to [place] rules. Spoke with Hospitalist who may consult ID due to reoccurring fevers. Will be glad to pursue [facility] if any long term treatment identified. [place] [facility] [place] way of [facility] electronically). Aly Greene aware that patient is not ready for d/c as of yet.

## 2020-07-22 NOTE — PROGRESS NOTES
Problem: Self Care Deficits Care Plan (Adult)  Goal: *Acute Goals and Plan of Care (Insert Text)  Description: GOALS:   DISCHARGE GOALS (in preparation for going home/rehab):  3 days  1. Mr. Mamadou Brown will perform one lower body dressing activity with modeate assistance required to demonstrate improved functional mobility and safety. 2.  Mr. Mamadou Brown will perform one lower body bathing activity with minimal assistance required to demonstrate improved functional mobility and safety. 3.  Mr. Mamadou Brown will perform toileting/toilet transfer with contact guard assistance to demonstrate improved functional mobility and safety. 4.  Mr. Mamadou Brown will perform shower transfer with contact guard assistance to demonstrate improved functional mobility and safety. JOINT CAMP OCCUPATIONAL THERAPY TKA: Daily Note 7/22/2020  INPATIENT: Hospital Day: 8  Payor: [facility] MEDICARE / Plan: [facility] MEDICARE HMO/PPO / Product Type: Managed Care Medicare /      NAME/AGE/GENDER: Chris Bob is a 80 y.o. male   PRIMARY DIAGNOSIS:  * No surgery found *   * Surgery not found * (Cannot find OR record)  ICD-10: Treatment Diagnosis:    · Pain in Right Knee (M25.561)  · Stiffness of Right Knee, Not elsewhere classified (B82.701)      ASSESSMENT:     Mr. Mamadou Brown is s/p Right TKA last week and returned and presents with decreased weight bearing on R LE and decreased independence with functional mobility and activities of daily living as compared to baseline level of function and safety. Max assist into stance. Normally patient lives alone and has help from friend at times for self cares. Walks with rollator and limited mobility prior to surgery. High pain. Fatigues quickly. 7/22/2020 Patient up in recliner. Patient up on feet a total of 2 times, with max assist. Required encouragement for length of stance. Decent time in between stances as fatigue level is high. Needs rehab.        This section established at most recent assessment PROBLEM LIST (Impairments causing functional limitations):  1. Decreased Strength  2. Decreased ADL/Functional Activities  3. Decreased Transfer Abilities  4. Increased Pain  5. Increased Fatigue  6. Decreased Flexibility/Joint Mobility  7. Decreased Knowledge of Precautions   INTERVENTIONS PLANNED: (Benefits and precautions of occupational therapy have been discussed with the patient.)  1. Activities of daily living training  2. Adaptive equipment training  3. Balance training  4. Clothing management  5. Donning&doffing training  6. Therapeutic activity  7. Therapeutic exercise     TREATMENT PLAN: Frequency/Duration: Follow patient 4x a week to address above goals. Rehabilitation Potential For Stated Goals: Good     RECOMMENDED REHABILITATION/EQUIPMENT: (at time of discharge pending progress): Continue Skilled Therapy. OCCUPATIONAL PROFILE AND HISTORY:   History of Present Injury/Illness (Reason for Referral): Pt presents this date s/p (Right) TKA 7/13  Past Medical History/Comorbidities:   Mr. Rohit Mcgee  has a past medical history of Acute lumbar radiculopathy, Arthritis, Ascending aorta dilatation ([address] Atrial fibrillation with RVR ([address] Enlarged prostate, Martinez catheter in place, GERD (gastroesophageal reflux disease), High cholesterol, Hypertension, ICH (intracerebral hemorrhage) ([address] Lumbar stenosis with neurogenic claudication, Other forms of scoliosis, lumbar region, Poor historian, Psychiatric disorder, Seizures ([address] Tongue lesion, and Urinary frequency.   Mr. Rohit Mcgee  has a past surgical history that includes hx other surgical; hx turp (10/20/11); hx heent (8/2012); hx orthopaedic (Right); and hx other surgical.  Social History/Living Environment:   Home Environment: Private residence  # Steps to Enter: 1(4 ft ramp for dog)  One/Two Story Residence: One story  Living Alone: Yes  Support Systems: Family member(s)  Patient Expects to be Discharged to[de-identified] Skilled nursing facility  Current DME Used/Available at Home: karthikeyan Wilks  Prior Level of Function/Work/Activity:  Uses rollator. Assist with bathing and IADL's. Number of Personal Factors/Comorbidities that affect the Plan of Care: Expanded review of therapy/medical records (1-2):  MODERATE COMPLEXITY   ASSESSMENT OF OCCUPATIONAL PERFORMANCE[de-identified]   Most Recent Physical Functioning:   Balance  Sitting: Intact  Standing: Pull to stand; With support  Standing - Static: Fair  Standing - Dynamic : Fair                              Mental Status  Neurologic State: Alert  Orientation Level: Oriented X4  Cognition: Follows commands                Basic ADLs (From Assessment) Complex ADLs (From Assessment)   Basic ADL  Type of Bath: Chlorhexidine (CHG), Partial     Grooming/Bathing/Dressing Activities of Daily Living                             Bed/Mat Mobility  Supine to Sit: Moderate assistance;Maximum assistance  Sit to Supine: Moderate assistance;Maximum assistance  Sit to Stand: Moderate assistance;Assist x2;Maximum assistance  Stand to Sit: Moderate assistance;Assist x2;Maximum assistance  Bed to Chair: Moderate assistance;Assist x2;Maximum assistance         Physical Skills Involved:  1. Range of Motion  2. Balance  3. Strength  4. Activity Tolerance  5. Fine Motor Control  6. Gross Motor Control  7. Pain (acute) Cognitive Skills Affected (resulting in the inability to perform in a timely and safe manner):  1. [facility] Psychosocial Skills Affected:  1. Habits/Routines  2. Environmental Adaptation  3. Social Interaction   Number of elements that affect the Plan of Care: 3-5:  MODERATE COMPLEXITY   CLINICAL DECISION MAKIN [facility] [address] [address] AM-PAC 6 Clicks   Daily Activity Inpatient Short Form  How much help from another person does the patient currently need. .. Total A Lot A Little None   1. Putting on and taking off regular lower body clothing? [x] 1   [] 2   [] 3   [] 4   2. Bathing (including washing, rinsing, drying)?    [x] 1   [] 2   [] 3 [] 4   3. Toileting, which includes using toilet, bedpan or urinal?   [x] 1   [] 2   [] 3   [] 4   4. Putting on and taking off regular upper body clothing? [] 1   [] 2   [x] 3   [] 4   5. Taking care of personal grooming such as brushing teeth? [] 1   [] 2   [x] 3   [] 4   6. Eating meals? [] 1   [] 2   [x] 3   [] 4   © 2007, Trustees of [address] [place] [address] [address] [address], under license to [employer]. All rights reserved     Score:  Initial: 12 Most Recent: X (Date: -- )    Interpretation of Tool:  Represents activities that are increasingly more difficult (i.e. Bed mobility, Transfers, Gait). Medical Necessity:     · Skilled intervention continues to be required due to Deficits listed above. Reason for Services/Other Comments:  · Patient continues to require skilled intervention due to   · Dx above  · . Use of outcome tool(s) and clinical judgement create a POC that gives a: MODERATE COMPLEXITY            TREATMENT:   (In addition to Assessment/Re-Assessment sessions the following treatments were rendered)     Pre-treatment Symptoms/Complaints:    Pain: Initial:   Pain Intensity 1: 5  Pain Location 1: Knee  Pain Orientation 1: Right  Post Session:  7     Therapeutic Activity (  30 minutes): Therapeutic activities per grid below to improve mobility and balance. Required maximal visual, verbal and tactile cues to promote static balance in standing. Treatment/Session Assessment:     Response to Treatment:  Good, supine in bed.     Education:  [] Home Exercises  [x] Fall Precautions  [] Hip Precautions [] Going Home Video  [x] Knee/Hip Prosthesis Review  [x] Walker Management/Safety [x] Adaptive Equipment as Needed       Interdisciplinary Collaboration:   o Physical Therapist  o Occupational Therapist  o Registered Nurse    After treatment position/precautions:   o Up in chair  o Bed/Chair-wheels locked  o Caregiver at bedside  o Call light within reach  o RN notified     Compliance with Program/Exercises: Compliant all of the time, Will assess as treatment progresses. Recommendations/Intent for next treatment session:  Treatment next visit will focus on increasing Mr. Stevie Steward independence with bed mobility, transfers, self care, functional mobility, modalities for pain, and patient education.       Total Treatment Duration:  OT Patient Time In/Time Out  Time In: 1405  Time Out: [address], OT

## 2020-07-22 NOTE — PROGRESS NOTES
Patient resting in bed. Therapy help place patient back to bed. Max assist. Martinez patent. No needs at this time.

## 2020-07-22 NOTE — PROGRESS NOTES
Problem: Mobility Impaired (Adult and Pediatric)  Goal: *Acute Goals and Plan of Care (Insert Text)  Description: GOALS (1-4 days):  (1.)Mr. George Collier will move from supine to sit and sit to supine  in bed with MINIMAL ASSIST. Met 7/19  (2.)Mr. George Collier will transfer from bed to chair and chair to bed with MINIMAL ASSIST using the least restrictive device. (3.)Mr. George Collier will ambulate with MINIMAL ASSIST for 25 feet with the least restrictive device. (4)Mr. George Collier will increase right knee ROM to 5°-80° and perform HEP with cues and assistance. .  ________________________________________________________________________________________________      PHYSICAL THERAPY: Daily Note and AM 7/22/2020  INPATIENT: PT Visit Days : 5  Payor[place] / Plan[place] / Product Type: Managed Care Medicare /       NAME/AGE/GENDER: Flex Mesa is a 80 y.o. male   PRIMARY DIAGNOSIS: Sepsis ([facility] [A41.9]  Atrial fibrillation with RVR ([facility] [facility] [I48.91] Sepsis ([facility] [address] Sepsis ([facility] [address]       ICD-10: Treatment Diagnosis:    · Generalized Muscle Weakness (M62.81)  · Other abnormalities of gait and mobility (R26.89)   Precaution/Allergies:  Patient has no known allergies. ASSESSMENT:     Mr. George Collier showed improved bed mobility skills, along with improved level of assist for transfers also. pt still is very weak, being more complicated to manage than family or caregivers could provide. This pt needed ice cuff on knee to control edema & aggressive post acute therapy at [facility] or SNF if 9th floor not available. This pt is motivated to work & has good DC plan to home with caregivers. This pt would likely progress more quickly with more intense rehab efforts. 7/20/20 ; pt presented as much weaker, needing more help with bed mobility, transfers & gait. Pt's rate of decline could be offset with more frequent & intense therapy at [facility] or SNF.  7/21--Pt supine with head of bed elevated, finished eating breakfast, on contact. Pt had therapist open pears and started eating those. Pt appeared confused as to why therapist was checking on him, as he is going to go to therapy. Pt agreeable to exercises, then stated it takes two people to get him to bedside chair. Pt's bed noted to be in flexed position under knees. Therapist locked lower part of bed into extension. Pt assisted to scoot up in bed so that his feet were not pushing against the foot of the bed. Pt performed exercises in bed, as below. On second entry, pt supine. Pt performed supine to sit to stand. Pt took steps to bedside chair. Pt in bedside chair with needs in reach.   7/22--Pt performed supine to sit to stand. Pt stood to doff brief. Pt performed sit to stand three times with assistance of therapist and CNA. Pt tolerated standing one minute, requiring sitting rest breaks between activities. Pt performed sit to stand and was assisted to bedside chair with assistance of CNA and therapist. Pt performed sit to stand to don brief. Pt in bedside chair with needs in reach. This section established at most recent assessment   PROBLEM LIST (Impairments causing functional limitations):  1. Decreased Strength  2. Decreased ADL/Functional Activities  3. Decreased Transfer Abilities  4. Decreased Ambulation Ability/Technique  5. Decreased Balance  6. Increased Pain  7. Decreased Activity Tolerance  8. Increased Fatigue  9. Decreased Flexibility/Joint Mobility  10. Edema/Girth  11. Decreased Storey with Home Exercise Program   INTERVENTIONS PLANNED: (Benefits and precautions of physical therapy have been discussed with the patient.)  1. Balance Exercise  2. Bed Mobility  3. Family Education  4. Gait Training  5. Home Exercise Program (HEP)  6. Range of Motion (ROM)  7. Therapeutic Activites  8. Therapeutic Exercise/Strengthening  9.  Transfer Training     TREATMENT PLAN: Frequency/Duration: daily for duration of hospital stay  Rehabilitation Potential For Stated Goals: Illona Nicely REHAB RECOMMENDATIONS (at time of discharge pending progress):    Placement:  rehab  Equipment:    None at this time              HISTORY:   History of Present Injury/Illness (Reason for Referral):  Mr. Angela Montes is a nice 79 y/o WM sent to the ER from home on 7/15 for confusion. 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 Yesterday a friend thought he was confused and dizzy and sent him to the ER. He was in rapid AFib and met sepsis criteria. CXR negative. Cultures collected. He was started on antibiotics and a Cardizem drip and admitted. Ortho consulted. 7/17: O2 from 2L to 4L. Afebrile overnight. WBCs up a little. He is asking if he can go home today. Overnight nurse changed knee bandage, I reviewed a picture that was taken of the surgical site and there was one circular area of erythema just distal to the knee, lateral side of incision. Had to get Cardizem bolus overnight, back in NSR this morning. Bps good. Past Medical History/Comorbidities:   Mr. Angela Montes  has a past medical history of Acute lumbar radiculopathy, Arthritis, Ascending aorta dilatation ([address] Atrial fibrillation with RVR ([address] Enlarged prostate, Martinez catheter in place, GERD (gastroesophageal reflux disease), High cholesterol, Hypertension, ICH (intracerebral hemorrhage) ([address] Lumbar stenosis with neurogenic claudication, Other forms of scoliosis, lumbar region, Poor historian, Psychiatric disorder, Seizures ([address] Tongue lesion, and Urinary frequency.   Mr. Angela Montes  has a past surgical history that includes hx other surgical; hx turp (10/20/11); hx heent (8/2012); hx orthopaedic (Right); and hx other surgical.  Social History/Living Environment:   Home Environment: Private residence  # Steps to Enter: 1(4 ft ramp for dog)  One/Two Story Residence: One story  Living Alone: Yes  Support Systems: Family member(s)  Patient Expects to be Discharged to[de-identified] Skilled nursing facility  Current DME Used/Available at Home: karthikeyan Lincoln  Prior Level of Function/Work/Activity:  Using RW after tka     Number of Personal Factors/Comorbidities that affect the Plan of Care: 3+: HIGH COMPLEXITY   EXAMINATION:   Most Recent Physical Functioning:   Gross Assessment:                       Balance:  Sitting: Intact  Standing: Pull to stand; With support  Standing - Static: Fair  Standing - Dynamic : Fair Bed Mobility:  Supine to Sit: Moderate assistance       Transfers:  Sit to Stand: Moderate assistance;Assist x2;Maximum assistance  Stand to Sit: Moderate assistance;Assist x2;Maximum assistance  Stand Pivot Transfers: Moderate assistance;Assist x2;Maximum assistance  Bed to Chair: Moderate assistance;Assist x2;Maximum assistance  Gait:  Right Side Weight Bearing: As tolerated  Distance (ft): 8 Feet (ft)(to bedside chair)  Assistive Device: Walker, rolling  Ambulation - Level of Assistance: Moderate assistance;Assist x2;Maximum assistance      Body Structures Involved:  1. Bones  2. Joints  3. Muscles  4. Ligaments Body Functions Affected:  1. Movement Related Activities and Participation Affected:  1. Mobility   Number of elements that affect the Plan of Care: 3: MODERATE COMPLEXITY   CLINICAL PRESENTATION:   Presentation: Stable and uncomplicated: LOW COMPLEXITY   CLINICAL DECISION MAKIN [place] [id number] AM-PAC 6 Clicks   Basic Mobility Inpatient Short Form  How much difficulty does the patient currently have. .. Unable A Lot A Little None   1. Turning over in bed (including adjusting bedclothes, sheets and blankets)? [] 1   [x] 2   [] 3   [] 4   2. Sitting down on and standing up from a chair with arms ( e.g., wheelchair, bedside commode, etc.)   [] 1   [x] 2   [] 3   [] 4   3. Moving from lying on back to sitting on the side of the bed? [] 1   [x] 2   [] 3   [] 4   How much help from another person does the patient currently need. .. Total A Lot A Little None   4. Moving to and from a bed to a chair (including a wheelchair)?    [] 1   [x] 2   [] 3   [] 4 5.  Need to walk in hospital room? [x] 1   [] 2   [] 3   [] 4   6. Climbing 3-5 steps with a railing? [x] 1   [] 2   [] 3   [] 4   © 2007, Trustees of [address] [place] [address] [address] [address], under license to [employer]. All rights reserved      Score:  Initial: 10 Most Recent: X (Date: -- )    Interpretation of Tool:  Represents activities that are increasingly more difficult (i.e. Bed mobility, Transfers, Gait). Medical Necessity:     · Patient is expected to demonstrate progress in   · strength, range of motion, and balance  ·  to   · decrease assistance required with exercises and functional mobility  · . Reason for Services/Other Comments:  · Patient   · continues to require present interventions due to patient's inability to perform exercises and functional mobility independently  · . Use of outcome tool(s) and clinical judgement create a POC that gives a: Questionable prediction of patient's progress: MODERATE COMPLEXITY            TREATMENT:   (In addition to Assessment/Re-Assessment sessions the following treatments were rendered)   Pre-treatment Symptoms/Complaints:  \" I can't go home ? \"  Pain: Initial: numeric scale     Post Session: 4/10   Therapeutic Exercise: ():  Exercises per grid below to improve mobility. Therapeutic Activity: (   45 minutes ):  Therapeutic activities including TKA protocol exercises, bed mobility, sit<>stand, standing balance with walker, short distance ambulation with rolling walker to improve mobility, strength, and balance. Required moderate   to promote static and dynamic balance in standing. Gait Training (  10 minutes):  Gait training to improve and/or restore physical functioning as related to mobility. .    Assistive Device: Walker, rolling  Ambulation - Level of Assistance:  Moderate assistance, Assist x2, Maximum assistance  Distance (ft): 8 Feet (ft)(to bedside chair)  Interventions: Manual cues, Verbal cues           Date:  7/21 Date:  7/19 Date:  7/20 ACTIVITY/EXERCISE AM PM AM PM AM PM   GROUP THERAPY  []  []  []  []  []  []   Ankle Pumps 20  20  20    Quad Sets 20  20  20    Gluteal Sets 20  20  20    Hip ABd/ADduction 20aa  20aa  20aa    Straight Leg Raises 20aa  20aa  20aa    Knee Slides 20aa  20aa  20aa    Short Arc Quads 20  20aa  21    Long Arc Quads         Chair Slides   20aa  20             B = bilateral; AA = active assistive; A = active; P = passive       Braces/Orthotics/Lines/Etc:   · IV  · arnett catheter  · O2 Device: Nasal [id number]  Treatment/Session Assessment:    · Response to Treatment:  Pt agreeable to get to bedside chair. · Interdisciplinary Collaboration:   o Physical Therapist  o Registered Nurse  · After treatment position/precautions:   o Up in chair  o Bed/Chair-wheels locked  o Bed in low position  o Call light within reach  o RN notified   · Compliance with Program/Exercises: Will assess as treatment progresses  · Recommendations/Intent for next treatment session: \"Next visit will focus on advancements to more challenging activities and reduction in assistance provided\".   Total Treatment Duration:  PT Patient Time In/Time Out  Time In: 1110  Time Out: [facility],[address], PT

## 2020-07-22 NOTE — PROGRESS NOTES
Name: Jose Osei MRN: [id number]  : 1934  Age:85 y.o.  male  Admit Date:  7/15/2020 LOS: 6      Hospitalist Progress Note    Jose Osei is a 80 y.o. male with medical history significant for major depressive disorder/anxiety, neurogenic bladder with suprapubic catheter, hypertension, GERD who was admitted from home on 7/15 for confusion. Patient recently underwent right total knee arthroplasty on . Patient was found to be confused/dizzy by a friend on  then sent him to the ED. Patient was found to have A. fib RVR on arrival and met sepsis criteria. CXR is negative. She was recollected and patient was started on IV antibiotics. Ortho was consulted. Right TKA incision site appears to be healing well therefore it is unlikely the source of sepsis. There was noted to have some purulence around suprapubic catheter but culture has been negative to date. Patient appeared to develop mild volume overload and started on IV Lasix. He noted to have EMILI with elevation of creatinine on . Cultures have been negative to date. Subjective (20) : Patient is seen and examined at bedside. Patient was febrile overnight to T-max of 102.3 F. He is otherwise hemodynamically stable, saturating on room air. Patient reports having 1 BM overnight. Patient denies chills, chest pains, shortness of breath, n/v, abdominal pain. Remains on 2L NC with sats recorded to be 98-93%. ROS: 10 point review of systems is otherwise negative with the exception of the elements mentioned above.     Objective:    Patient Vitals for the past 24 hrs:   Temp Pulse Resp BP SpO2   20 0712 98.8 °F (37.1 °C) 97 18 105/65 98 %   20 0356 99.9 °F (37.7 °C) 100 18 98/57 91 %   20 2332 (!) 102.3 °F (39.1 °C) (!) 132 18 136/80 94 %   20 1515 97.7 °F (36.5 °C) 77  149/75 95 %   20 1055 99 °F (37.2 °C) 99  110/79 96 %   20 0924     94 %     Oxygen Therapy  O2 Sat (%): 98 % (07/22/20 2590)  Pulse via Oximetry: 102 beats per minute (07/21/20 0924)  O2 Device: Nasal cannula (07/21/20 1515)  O2 Flow Rate (L/min): 2 l/min (07/21/20 0924)    Estimated body mass index is 32.06 kg/m² as calculated from the following:    Height as of this encounter: 6' 1\" (1.854 m). Weight as of this encounter: 110.2 kg (243 lb). Intake/Output Summary (Last 24 hours) at 7/22/2020 0749  Last data filed at 7/21/2020 1459  Gross per 24 hour   Intake    Output 1650 ml   Net -1650 ml       *Note that automatically entered I/Os may not be accurate; dependent on patient compliance with collection and accurate  by techs. Physical Exam:   General:     alert, awake, no acute distress. Well nourished. Obese  Head:   normocephalic, atraumatic  Eyes, Ears, nose: PERRL, EOMI. Normal conjunctiva  Neck:    supple, non-tender. Trachea midline. Lungs:   Bibasilar crackles, no wheezing  Cardiac:   Regular and tachycardic, Normal S1 and S2. Abdomen:   Soft, non distended, nontender, +BS,      (+) suprapubic catheter: site appears clean/dry without surrounding erythema   Extremities:   Warm, dry. B/l edema (R>L) with stasis dermatitis skin changes. Cooling brace to right knee. Skin:   No rashes, no jaundice  Neuro:  AAOx3.  No gross focal neurological deficit  Psychiatric:  No anxiety, calm, cooperative    Data Review:  I have reviewed all labs, meds, and studies from the last 24 hours:    Recent Results (from the past 24 hour(s))   PROLACTIN    Collection Time: 07/21/20  5:24 PM   Result Value Ref Range    Prolactin 8.1 ng/mL   URINALYSIS W/ RFLX MICROSCOPIC    Collection Time: 07/21/20  6:35 PM   Result Value Ref Range    Color BLANCA      Appearance CLEAR      Specific gravity 1.021 1.001 - 1.023      pH (UA) 6.5 5.0 - 9.0      Protein TRACE (A) NEG mg/dL    Glucose Negative mg/dL    Ketone Negative NEG mg/dL    Bilirubin Negative NEG      Blood Negative NEG      Urobilinogen 4.0 (H) 0.2 - 1.0 EU/dL Nitrites Negative NEG      Leukocyte Esterase Negative NEG      WBC 0-3 0 /hpf    RBC 0-3 0 /hpf    Epithelial cells 0-3 0 /hpf    Bacteria 0 0 /hpf    Casts 0 0 /lpf   METABOLIC PANEL, BASIC    Collection Time: 07/22/20  3:34 AM   Result Value Ref Range    Sodium 137 136 - 145 mmol/L    Potassium 4.2 3.5 - 5.1 mmol/L    Chloride 105 98 - 107 mmol/L    CO2 29 21 - 32 mmol/L    Anion gap 3 (L) 7 - 16 mmol/L    Glucose 121 (H) 65 - 100 mg/dL    BUN 31 (H) 8 - 23 MG/DL    Creatinine 0.94 0.8 - 1.5 MG/DL    GFR est AA >60 >60 ml/min/1.73m2    GFR est non-AA >60 >60 ml/min/1.73m2    Calcium 8.9 8.3 - 10.4 MG/DL   CBC WITH AUTOMATED DIFF    Collection Time: 07/22/20  3:34 AM   Result Value Ref Range    WBC 16.7 (H) 4.3 - 11.1 K/uL    RBC 3.10 (L) 4.23 - 5.6 M/uL    HGB 9.5 (L) 13.6 - 17.2 g/dL    HCT 30.6 (L) 41.1 - 50.3 %    MCV 98.7 (H) 79.6 - 97.8 FL    MCH 30.6 26.1 - 32.9 PG    MCHC 31.0 (L) 31.4 - 35.0 g/dL    RDW 13.7 11.9 - 14.6 %    PLATELET 278 538 - 442 K/uL    MPV 8.8 (L) 9.4 - 12.3 FL    ABSOLUTE NRBC 0.00 0.0 - 0.2 K/uL    DF AUTOMATED      NEUTROPHILS 76 43 - 78 %    LYMPHOCYTES 11 (L) 13 - 44 %    MONOCYTES 10 4.0 - 12.0 %    EOSINOPHILS 2 0.5 - 7.8 %    BASOPHILS 1 0.0 - 2.0 %    IMMATURE GRANULOCYTES 1 0.0 - 5.0 %    ABS. NEUTROPHILS 12.7 (H) 1.7 - 8.2 K/UL    ABS. LYMPHOCYTES 1.8 0.5 - 4.6 K/UL    ABS. MONOCYTES 1.7 (H) 0.1 - 1.3 K/UL    ABS. EOSINOPHILS 0.3 0.0 - 0.8 K/UL    ABS. BASOPHILS 0.1 0.0 - 0.2 K/UL    ABS. IMM.  GRANS. 0.1 0.0 - 0.5 K/UL        All Micro Results     Procedure Component Value Units Date/Time    CULTURE, BLOOD [[id number]] Collected:  07/21/20 1742    Order Status:  Completed Specimen:  Blood Updated:  07/21/20 1807    CULTURE, BLOOD [[id number]] Collected:  07/21/20 1724    Order Status:  Completed Specimen:  Blood Updated:  07/21/20 1807    CULTURE, BLOOD [[id number]] Collected:  07/15/20 2249    Order Status:  Completed Specimen:  Blood Updated:  07/20/20 0726     Special Requests: --        RIGHT  Antecubital       Culture result: NO GROWTH 5 DAYS       CULTURE, BLOOD [[id number]] Collected:  07/15/20 2334    Order Status:  Completed Specimen:  Blood Updated:  07/20/20 0726     Special Requests: --        RIGHT  HAND       Culture result: NO GROWTH 5 DAYS       CULTURE, Moncho Riff STAIN [[id number]] Collected:  07/16/20 1213    Order Status:  Completed Specimen:  Wound from Skin Updated:  07/19/20 0730     Special Requests: SUPRAPUBIC     GRAM STAIN 1 TO 5 WBCS SEEN PER OIF      NO DEFINITE ORGANISM SEEN        Culture result: NO GROWTH 2 DAYS       CULTURE, URINE [[id number]] Collected:  07/15/20 2321    Order Status:  Completed Specimen:  Urine Updated:  07/18/20 0837     Special Requests: NO SPECIAL REQUESTS        Culture result:       <10,000 COLONIES/mL MIXED SKIN MOE ISOLATED                Current Meds:  Current Facility-Administered Medications   Medication Dose Route Frequency    piperacillin-tazobactam (ZOSYN) 3.375 g in 0.9% sodium chloride (MBP/ADV) 100 mL  3.375 g IntraVENous Q8H    bisacodyL (DULCOLAX) suppository 10 mg  10 mg Rectal DAILY PRN    acetaminophen (TYLENOL) tablet 1,000 mg  1,000 mg Oral Q6H PRN    sodium chloride (NS) flush 5-40 mL  5-40 mL IntraVENous Q8H    sodium chloride (NS) flush 5-40 mL  5-40 mL IntraVENous PRN    HYDROcodone-acetaminophen (NORCO) 5-325 mg per tablet 1 Tab  1 Tab Oral Q4H PRN    naloxone (NARCAN) injection 0.4 mg  0.4 mg IntraVENous PRN    ondansetron (ZOFRAN) injection 4 mg  4 mg IntraVENous Q4H PRN    senna-docusate (PERICOLACE) 8.6-50 mg per tablet 2 Tab  2 Tab Oral DAILY PRN    LORazepam (ATIVAN) tablet 0.5 mg  0.5 mg Oral Q4H PRN    zolpidem (AMBIEN) tablet 5 mg  5 mg Oral QHS PRN    aspirin delayed-release tablet 81 mg  81 mg Oral BID    citalopram (CELEXA) tablet 20 mg  20 mg Oral DAILY    [Held by provider] losartan (COZAAR) tablet 25 mg  25 mg Oral QHS    metoprolol succinate (TOPROL-XL) XL tablet 100 mg 100 mg Oral QHS    pantoprazole (PROTONIX) tablet 40 mg  40 mg Oral ACB    oxyCODONE IR (ROXICODONE) tablet 5 mg  5 mg Oral Q4H PRN    polyethylene glycol (MIRALAX) packet 17 g  17 g Oral DAILY    senna-docusate (PERICOLACE) 8.6-50 mg per tablet 1 Tab  1 Tab Oral DAILY    traZODone (DESYREL) tablet 100 mg  100 mg Oral QHS PRN    metoprolol (LOPRESSOR) injection 5 mg  5 mg IntraVENous Q4H PRN         Other Studies:  Xr Chest Port    Result Date: 7/15/2020  EXAM: Chest x-ray. INDICATION: Dyspnea. COMPARISON: None. TECHNIQUE: Single frontal view. FINDINGS: The lungs are clear. The heart is enlarged. Mediastinal contour and pulmonary vasculature are within normal limits. No pneumothorax or pleural effusion is seen. IMPRESSION: 1. No acute process. 2. Cardiomegaly. Xr Knee Rt 3 V    Result Date: 7/16/2020  Portable right knee  7/16/2020 1:20 PM Indication: Patient immediately status post TKA Comparison: None available at this hospital PACS Findings: Portable AP, oblique and crosstable lateral views from 1246 are submitted. There is an anteriorly located vertically oriented skin staple line. There is soft tissues swelling and trace collections of air in the soft tissues and joints. No unexpected radiopaque foreign body. TKA prosthesis components appear in appropriate alignment with no acute complicating features. No bony fracture. Impression: Expected immediate postop TKA findings. No acute complicating features. Duplex Lower Ext Venous Right    Result Date: 7/16/2020  RIGHT LOWER EXTREMITY DEEP VENOUS SONOGRAPHY: CLINICAL HISTORY: Leg pain and swelling after recent right knee surgery. FINDINGS: Multiple images from real time ultrasound evaluation of the deep venous system of the right leg demonstrate normal venous flow in the posterior tibial, popliteal, and superficial and common femoral veins. Normal compressibility was demonstrated.  Flow was also documented in the proximal saphenous and deep femoral veins. No intraluminal echogenic material was seen to suggest the presence of nonobstructive thrombus. IMPRESSION: NO ULTRASOUND EVIDENCE OF DEEP VENOUS THROMBOSIS IN THE RIGHT LEG. Assessment and Plan: Active Hospital Problems    Diagnosis Date Noted    EMILI (acute kidney injury) ([facility] 07/20/2020    Atrial fibrillation with RVR ([facility] 07/16/2020    S[facility] 07/16/2020    Neurogenic bladder 07/16/2020    GERD (gastroesophageal reflux disease)      controlled w/med      Hypertension      Plan:    Sepsis likely from suprapubic cath site   All cultures have been neg to date. Febrile episode with Tmax of 102.3 on 7/22 overnight while on PO keflex/doxy since 7/18. Continues to have leukocytosis, febrile episode, elevated heart rate meeting SIRS criteria for sepsis of unknown etiology. Repeat UA on 7/21 is negative for UTI. Repeat CXR (7/22) without any new consolidation/infiltrates  -Broaden antibiotics to IV thank plus IV Zosyn for now  -Repeat blood culture(7/21) pending    Hypoxia likely due to underlying sepsis and volume overload  S/p lasix IV with -11L of net fluid balance. COVID test neg on 7/19  - O2 supplement and wean as tolerated      EMILI(resolved)  Increased to 1.51 and now back down to 0.94 today. Baseline creatinine of 0.98  - hold ACEi/ARB for now  - monitor    Afib RVR (resolved)  Likely due to acute illness/sepsis. Now sinus rhythm with possible PVC or aberrant complexes  - continue with metoprolol XL 100mg qHS  - no AC for now given recent procedure     MDD/Anxiety  - continue with celexa, PRN ativan. - per notes, he had suicidal ideation a few nights ago but he has denied any SI to me and during psych consult    Hypertension: continue with home medications  GERD: continue with PPI    Diet:  DIET REGULAR  DVT PPx: SCDs  Code: Full Code  Dispo: SNF  Estimated Discharge: TBD based on clinical course    Labs/Imaging Reviewed.    Patient is high risk due to current condition and comorbid conditions as well as requiring frequent monitoring. Plan discussed with staff, patient/family and are in agreement. Time Spent: Greater than 45 minutes was spent in reviewing charts, physical exam, discussion with patient, and answered any questions.     Signed By: Magi Navarro MD     July 22, 2020

## 2020-07-22 NOTE — PROGRESS NOTES
Vancomycin Consult    MD ordering: Mikel   ID following? no  Indication: Sepsis  Goal level(s): 15 - 20    Ht Readings from Last 1 Encounters:   07/15/20 6' 1\" (1.854 m)      Wt Readings from Last 1 Encounters:   20 110.2 kg (243 lb)         Temp (24hrs), Av.4 °F (37.4 °C), Min:97.7 °F (36.5 °C), Max:102.3 °F (39.1 °C)    Dosing weight: 110 kg  85 y.o. Date:  Dose/Freq Admin Times Level/Time:   20 Vanc 2500 mg IV x1 dose 0214     20 Vanc 1500 mg IV q18h 19520 Vanc 1500 mg IV q12h 0926, 21 Vanc 1500 mg IV q12h 0834  Order discontinued  Tr 2000 (canceled)             DOSING RESTARTED     20 Vanc 2 gm IV x 1 dose 1021    20 Vanc 1750 mg IV q12h (2200)    20 Vanc 1750 mg IV q12h (1000) Tr level due at 2100 (not ordered)           Recent Labs     20  0334 20  0348 20  0402   BUN 31* 37* 36*   CREA 0.94 1.10 1.51*   WBC 16.7* 15.6* 16.1*   PCT <0.05  --   --      Estimated Creatinine Clearance: 74.8 mL/min (based on SCr of 0.94 mg/dL). Corrected CrCl ~ 70 ml/min      Day 1 (since resuming) Assessment and Plan:  Pt was previously receiving Vancomycin 1500 mg IV every 12 hours. Vancomycin was discontinued on 20. Vancomycin was restarted today (20), at 2 gm IV x 1 dose, followed by 1750 mg IV every 12 hours. Next dose is due today at 2200. Trough level due tomorrow () at 2100 (pharmacy to order), prior to 2200 dose. Pharmacy will continue to follow.       Thank you,    Vera Kim, PharmD

## 2020-07-22 NOTE — PROGRESS NOTES
Patient resting in bed eating breakfast. PIV infusing with antibiotics. Medicated with pain medication for his knee. Suprapubic catheter draining diego color urine. Dressing around catheter was changed yesterday, its clean, dry and intact. Transfers with moderate assist. Bilateral weakness in his legs. Guerline Harness in place. Call light within reach. Bed is low and locked. Instructed to call for assistance. Verbalized understanding.

## 2020-07-23 LAB
ANION GAP SERPL CALC-SCNC: 5 MMOL/L (ref 7–16)
BASOPHILS # BLD: 0.1 K/UL (ref 0–0.2)
BASOPHILS NFR BLD: 1 % (ref 0–2)
BUN SERPL-MCNC: 29 MG/DL (ref 8–23)
CALCIUM SERPL-MCNC: 8.6 MG/DL (ref 8.3–10.4)
CHLORIDE SERPL-SCNC: 104 MMOL/L (ref 98–107)
CO2 SERPL-SCNC: 28 MMOL/L (ref 21–32)
CREAT SERPL-MCNC: 0.9 MG/DL (ref 0.8–1.5)
DIFFERENTIAL METHOD BLD: ABNORMAL
EOSINOPHIL # BLD: 0.3 K/UL (ref 0–0.8)
EOSINOPHIL NFR BLD: 2 % (ref 0.5–7.8)
ERYTHROCYTE [DISTWIDTH] IN BLOOD BY AUTOMATED COUNT: 13.6 % (ref 11.9–14.6)
GLUCOSE SERPL-MCNC: 115 MG/DL (ref 65–100)
HCT VFR BLD AUTO: 29.8 % (ref 41.1–50.3)
HGB BLD-MCNC: 9 G/DL (ref 13.6–17.2)
IMM GRANULOCYTES # BLD AUTO: 0.1 K/UL (ref 0–0.5)
IMM GRANULOCYTES NFR BLD AUTO: 1 % (ref 0–5)
LYMPHOCYTES # BLD: 1.8 K/UL (ref 0.5–4.6)
LYMPHOCYTES NFR BLD: 11 % (ref 13–44)
MCH RBC QN AUTO: 30.3 PG (ref 26.1–32.9)
MCHC RBC AUTO-ENTMCNC: 30.2 G/DL (ref 31.4–35)
MCV RBC AUTO: 100.3 FL (ref 79.6–97.8)
MONOCYTES # BLD: 1.7 K/UL (ref 0.1–1.3)
MONOCYTES NFR BLD: 10 % (ref 4–12)
NEUTS SEG # BLD: 12.1 K/UL (ref 1.7–8.2)
NEUTS SEG NFR BLD: 75 % (ref 43–78)
NRBC # BLD: 0 K/UL (ref 0–0.2)
PLATELET # BLD AUTO: 340 K/UL (ref 150–450)
PMV BLD AUTO: 8.6 FL (ref 9.4–12.3)
POTASSIUM SERPL-SCNC: 4.3 MMOL/L (ref 3.5–5.1)
RBC # BLD AUTO: 2.97 M/UL (ref 4.23–5.6)
SODIUM SERPL-SCNC: 137 MMOL/L (ref 136–145)
VANCOMYCIN TROUGH SERPL-MCNC: 22.3 UG/ML (ref 5–20)
WBC # BLD AUTO: 16 K/UL (ref 4.3–11.1)

## 2020-07-23 PROCEDURE — 77010033678 HC OXYGEN DAILY

## 2020-07-23 PROCEDURE — 97535 SELF CARE MNGMENT TRAINING: CPT

## 2020-07-23 PROCEDURE — 80048 BASIC METABOLIC PNL TOTAL CA: CPT

## 2020-07-23 PROCEDURE — 3331090002 HH PPS REVENUE DEBIT

## 2020-07-23 PROCEDURE — 97530 THERAPEUTIC ACTIVITIES: CPT

## 2020-07-23 PROCEDURE — 74011250637 HC RX REV CODE- 250/637: Performed by: NURSE PRACTITIONER

## 2020-07-23 PROCEDURE — 3331090001 HH PPS REVENUE CREDIT

## 2020-07-23 PROCEDURE — 97110 THERAPEUTIC EXERCISES: CPT

## 2020-07-23 PROCEDURE — 74011250637 HC RX REV CODE- 250/637: Performed by: INTERNAL MEDICINE

## 2020-07-23 PROCEDURE — 80202 ASSAY OF VANCOMYCIN: CPT

## 2020-07-23 PROCEDURE — 65270000029 HC RM PRIVATE

## 2020-07-23 PROCEDURE — 74011250636 HC RX REV CODE- 250/636: Performed by: INTERNAL MEDICINE

## 2020-07-23 PROCEDURE — 74011000258 HC RX REV CODE- 258: Performed by: INTERNAL MEDICINE

## 2020-07-23 PROCEDURE — 94762 N-INVAS EAR/PLS OXIMTRY CONT: CPT

## 2020-07-23 PROCEDURE — 36415 COLL VENOUS BLD VENIPUNCTURE: CPT

## 2020-07-23 PROCEDURE — 85025 COMPLETE CBC W/AUTO DIFF WBC: CPT

## 2020-07-23 RX ADMIN — ASPIRIN 81 MG: 81 TABLET, COATED ORAL at 17:12

## 2020-07-23 RX ADMIN — LOSARTAN POTASSIUM 25 MG: 25 TABLET ORAL at 21:18

## 2020-07-23 RX ADMIN — CITALOPRAM HYDROBROMIDE 20 MG: 20 TABLET ORAL at 08:32

## 2020-07-23 RX ADMIN — ASPIRIN 81 MG: 81 TABLET, COATED ORAL at 08:31

## 2020-07-23 RX ADMIN — PIPERACILLIN AND TAZOBACTAM 3.38 G: 3; .375 INJECTION, POWDER, LYOPHILIZED, FOR SOLUTION INTRAVENOUS at 08:35

## 2020-07-23 RX ADMIN — DOCUSATE SODIUM 50MG AND SENNOSIDES 8.6MG 1 TABLET: 8.6; 5 TABLET, FILM COATED ORAL at 08:33

## 2020-07-23 RX ADMIN — HYDROCODONE BITARTRATE AND ACETAMINOPHEN 1 TABLET: 5; 325 TABLET ORAL at 16:22

## 2020-07-23 RX ADMIN — LORAZEPAM 0.5 MG: 0.5 TABLET ORAL at 06:03

## 2020-07-23 RX ADMIN — VANCOMYCIN HYDROCHLORIDE 1750 MG: 10 INJECTION, POWDER, LYOPHILIZED, FOR SOLUTION INTRAVENOUS at 10:00

## 2020-07-23 RX ADMIN — ACETAMINOPHEN 1000 MG: 500 TABLET, FILM COATED ORAL at 16:22

## 2020-07-23 RX ADMIN — Medication 10 ML: at 06:05

## 2020-07-23 RX ADMIN — METOPROLOL SUCCINATE 100 MG: 50 TABLET, EXTENDED RELEASE ORAL at 21:18

## 2020-07-23 RX ADMIN — PIPERACILLIN AND TAZOBACTAM 3.38 G: 3; .375 INJECTION, POWDER, LYOPHILIZED, FOR SOLUTION INTRAVENOUS at 23:42

## 2020-07-23 RX ADMIN — VANCOMYCIN HYDROCHLORIDE 1750 MG: 10 INJECTION, POWDER, LYOPHILIZED, FOR SOLUTION INTRAVENOUS at 21:19

## 2020-07-23 RX ADMIN — PANTOPRAZOLE SODIUM 40 MG: 40 TABLET, DELAYED RELEASE ORAL at 06:04

## 2020-07-23 RX ADMIN — PIPERACILLIN AND TAZOBACTAM 3.38 G: 3; .375 INJECTION, POWDER, LYOPHILIZED, FOR SOLUTION INTRAVENOUS at 16:05

## 2020-07-23 RX ADMIN — PIPERACILLIN AND TAZOBACTAM 3.38 G: 3; .375 INJECTION, POWDER, LYOPHILIZED, FOR SOLUTION INTRAVENOUS at 00:01

## 2020-07-23 RX ADMIN — HYDROCODONE BITARTRATE AND ACETAMINOPHEN 1 TABLET: 5; 325 TABLET ORAL at 10:30

## 2020-07-23 RX ADMIN — HYDROCODONE BITARTRATE AND ACETAMINOPHEN 1 TABLET: 5; 325 TABLET ORAL at 21:24

## 2020-07-23 NOTE — PROGRESS NOTES
Patient resting quietly, alert and oriented, no distress noted. Right knee dressing c/d/i, suprapublic cathetar drained for 100 ml/hr. Nursing aide states it was recently emptied as well. Neurovascular and peripheral vascular checks WNL. Denies pain, assisted up in bed. Bed low and locked position. Fresh ice placed in iceman. Call light within reach. Patient instructed to call for assistance, verbalizes understanding. Nursing assessment complete.

## 2020-07-23 NOTE — PROGRESS NOTES
Pt remains free of pressure injury as evidence by no skin breakdown/redness of the skin as of 1416 on 07/23/2020.     Pt remains free from falls as of 1417 on 07/23/2020

## 2020-07-23 NOTE — PROGRESS NOTES
Pt resting in bed, call light within reach, bed low and locked, and side rails up x3. Shift assessment complete. Pt able to dorsi/plantar flex bilaterally and has +2 pedal pulses. Pain controlled at this time and needs have been met.

## 2020-07-23 NOTE — PROGRESS NOTES
Problem: Self Care Deficits Care Plan (Adult)  Goal: *Acute Goals and Plan of Care (Insert Text)  Description: GOALS:   DISCHARGE GOALS (in preparation for going home/rehab):  3 days  1. Mr. Radha García will perform one lower body dressing activity with modeate assistance required to demonstrate improved functional mobility and safety. 2.  Mr. Radha García will perform one lower body bathing activity with minimal assistance required to demonstrate improved functional mobility and safety. 3.  Mr. Radha García will perform toileting/toilet transfer with contact guard assistance to demonstrate improved functional mobility and safety. 4.  Mr. Radha García will perform shower transfer with contact guard assistance to demonstrate improved functional mobility and safety. JOINT CAMP OCCUPATIONAL THERAPY TKA: Daily Note 7/23/2020  INPATIENT: Hospital Day: 9  Payor: [facility] MEDICARE / Plan: [facility] MEDICARE HMO/PPO / Product Type: Managed Care Medicare /      NAME/AGE/GENDER: Lexis Fagan is a 80 y.o. male   PRIMARY DIAGNOSIS:  * No surgery found *   * Surgery not found * (Cannot find OR record)  ICD-10: Treatment Diagnosis:    · Pain in Right Knee (M25.561)  · Stiffness of Right Knee, Not elsewhere classified (H49.021)      ASSESSMENT:     Mr. Radha García is s/p Right TKA last week and returned and presents with decreased weight bearing on R LE and decreased independence with functional mobility and activities of daily living as compared to baseline level of function and safety. Max assist into stance. Normally patient lives alone and has help from friend at times for self cares. Walks with rollator and limited mobility prior to surgery. High pain. Fatigues quickly. 7/23/2020 Able to shave sitting up in bed with moderate assist, patient a little ataxic. Stood from raised bed and transferred to recliner with max assist. Will be a long recovery.        This section established at most recent assessment   PROBLEM LIST (Impairments causing functional limitations):  1. Decreased Strength  2. Decreased ADL/Functional Activities  3. Decreased Transfer Abilities  4. Increased Pain  5. Increased Fatigue  6. Decreased Flexibility/Joint Mobility  7. Decreased Knowledge of Precautions   INTERVENTIONS PLANNED: (Benefits and precautions of occupational therapy have been discussed with the patient.)  1. Activities of daily living training  2. Adaptive equipment training  3. Balance training  4. Clothing management  5. Donning&doffing training  6. Therapeutic activity  7. Therapeutic exercise     TREATMENT PLAN: Frequency/Duration: Follow patient 4x a week to address above goals. Rehabilitation Potential For Stated Goals: Good     RECOMMENDED REHABILITATION/EQUIPMENT: (at time of discharge pending progress): Continue Skilled Therapy. OCCUPATIONAL PROFILE AND HISTORY:   History of Present Injury/Illness (Reason for Referral): Pt presents this date s/p (Right) TKA 7/13  Past Medical History/Comorbidities:   Mr. Constance Pearson  has a past medical history of Acute lumbar radiculopathy, Arthritis, Ascending aorta dilatation ([address] Atrial fibrillation with RVR ([address] Enlarged prostate, Martinez catheter in place, GERD (gastroesophageal reflux disease), High cholesterol, Hypertension, ICH (intracerebral hemorrhage) ([address] Lumbar stenosis with neurogenic claudication, Other forms of scoliosis, lumbar region, Poor historian, Psychiatric disorder, Seizures ([address] Tongue lesion, and Urinary frequency.   Mr. Constance Pearson  has a past surgical history that includes hx other surgical; hx turp (10/20/11); hx heent (8/2012); hx orthopaedic (Right); and hx other surgical.  Social History/Living Environment:   Home Environment: Private residence  # Steps to Enter: 1(4 ft ramp for dog)  One/Two Story Residence: One story  Living Alone: Yes  Support Systems: Family member(s)  Patient Expects to be Discharged to[de-identified] Skilled nursing facility  Current DME Used/Available at Home: Guerline Gowers, rollator  Prior Level of Function/Work/Activity:  Uses rollator. Assist with bathing and IADL's. Number of Personal Factors/Comorbidities that affect the Plan of Care: Expanded review of therapy/medical records (1-2):  MODERATE COMPLEXITY   ASSESSMENT OF OCCUPATIONAL PERFORMANCE[de-identified]   Most Recent Physical Functioning:                                                   Basic ADLs (From Assessment) Complex ADLs (From Assessment)   Basic ADL  Type of Bath: Patient refused     Grooming/Bathing/Dressing Activities of Daily Living   Grooming  Grooming Assistance: Moderate assistance                                   Physical Skills Involved:  1. Range of Motion  2. Balance  3. Strength  4. Activity Tolerance  5. Fine Motor Control  6. Gross Motor Control  7. Pain (acute) Cognitive Skills Affected (resulting in the inability to perform in a timely and safe manner):  1. [facility] Psychosocial Skills Affected:  1. Habits/Routines  2. Environmental Adaptation  3. Social Interaction   Number of elements that affect the Plan of Care: 3-5:  MODERATE COMPLEXITY   CLINICAL DECISION MAKIN[address] [place] [id number] AM-PAC 6 Clicks   Daily Activity Inpatient Short Form  How much help from another person does the patient currently need. .. Total A Lot A Little None   1. Putting on and taking off regular lower body clothing? [x] 1   [] 2   [] 3   [] 4   2. Bathing (including washing, rinsing, drying)? [x] 1   [] 2   [] 3   [] 4   3. Toileting, which includes using toilet, bedpan or urinal?   [x] 1   [] 2   [] 3   [] 4   4. Putting on and taking off regular upper body clothing? [] 1   [] 2   [x] 3   [] 4   5. Taking care of personal grooming such as brushing teeth? [] 1   [] 2   [x] 3   [] 4   6. Eating meals? [] 1   [] 2   [x] 3   [] 4   © , Trustees of [address] [place] [address] [address] [address], under license to [email or web address].  All rights reserved     Score:  Initial: 12 Most Recent: X (Date: -- )    Interpretation of Tool: Represents activities that are increasingly more difficult (i.e. Bed mobility, Transfers, Gait). Medical Necessity:     · Skilled intervention continues to be required due to Deficits listed above. Reason for Services/Other Comments:  · Patient continues to require skilled intervention due to   · Dx above  · . Use of outcome tool(s) and clinical judgement create a POC that gives a: MODERATE COMPLEXITY            TREATMENT:   (In addition to Assessment/Re-Assessment sessions the following treatments were rendered)     Pre-treatment Symptoms/Complaints:    Pain: Initial:   Pain Intensity 1: 5  Pain Location 1: Knee  Pain Orientation 1: Right  Post Session:  7     Self Care: (30): Procedure(s) (per grid) utilized to improve and/or restore self-care/home management as related to bathing and grooming. Required moderate verbal, manual and tactile cueing to facilitate activities of daily living skills. Therapeutic Activity (  30 Minutes(extra time to work through activity noted)): Therapeutic activities per grid below to improve mobility, strength and balance. Required maximal verbal, manual and tactile cues to promote static balance in standing and promote motor control of bilateral, upper extremity(s), lower extremity(s). Treatment/Session Assessment:     Response to Treatment:  Good, up in recliner. Education:  [] Home Exercises  [x] Fall Precautions  [] Hip Precautions [] Going Home Video  [x] Knee/Hip Prosthesis Review  [x] Walker Management/Safety [x] Adaptive Equipment as Needed       Interdisciplinary Collaboration:   o Physical Therapist  o Occupational Therapist  o Registered Nurse    After treatment position/precautions:   o Up in chair  o Bed/Chair-wheels locked  o Caregiver at bedside  o Call light within reach  o RN notified     Compliance with Program/Exercises: Compliant all of the time, Will assess as treatment progresses.     Recommendations/Intent for next treatment session: Treatment next visit will focus on increasing Mr. August Stout independence with bed mobility, transfers, self care, functional mobility, modalities for pain, and patient education.       Total Treatment Duration:  OT Patient Time In/Time Out  Time In: 1030  Time Out: [address], [place]

## 2020-07-23 NOTE — PROGRESS NOTES
Problem: Mobility Impaired (Adult and Pediatric)  Goal: *Acute Goals and Plan of Care (Insert Text)  Description: GOALS (1-4 days):  (1.)Mr. Azam Sharif will move from supine to sit and sit to supine  in bed with MINIMAL ASSIST. Met 7/19  (2.)Mr. Azam Sharif will transfer from bed to chair and chair to bed with MINIMAL ASSIST using the least restrictive device. (3.)Mr. Azam Sharif will ambulate with MINIMAL ASSIST for 25 feet with the least restrictive device. (4)Mr. Azam Sharif will increase right knee ROM to 5°-80° and perform HEP with cues and assistance. .  ________________________________________________________________________________________________      PHYSICAL THERAPY: Daily Note and PM 7/23/2020  INPATIENT: PT Visit Days : 6  Payor: [facility] MEDIC[facility] / Plan: [facility] [place]/PPO / Product Type: Managed Care Medicare /       NAME/AGE/GENDER: Chloe Mello is a 80 y.o. male   PRIMARY DIAGNOSIS: Sepsis ([facility] [A41.9]  Atrial fibrillation with RVR ([facility] [I48.91] Sepsis ([facility] Sepsis ([facility]       ICD-10: Treatment Diagnosis:    · Generalized Muscle Weakness (M62.81)  · Other abnormalities of gait and mobility (R26.89)   Precaution/Allergies:  Patient has no known allergies. ASSESSMENT:     Mr. Azam Sharif showed improved bed mobility skills, along with improved level of assist for transfers also. pt still is very weak, being more complicated to manage than family or caregivers could provide. This pt needed ice cuff on knee to control edema & aggressive post acute therapy at [facility] or SNF if 9th floor not available. This pt is motivated to work & has good DC plan to home with caregivers. This pt would likely progress more quickly with more intense rehab efforts. 7/23/20:  Pt sitting up in chair on arrival. He is agreeable to PT. Exercises sitting up in chair per chart below. Sit to stand 4 times with mod to max assist x1.  Pt required sit to stand first time with max assist. Following sit to stand required mod assist. Pt able to stand for extended time to have brief changed and linens changed in chair. He required a long sitting rest break between each stand. He did much better this afternoon with mobility. Pt asked to stay up in the chair. This section established at most recent assessment   PROBLEM LIST (Impairments causing functional limitations):  1. Decreased Strength  2. Decreased ADL/Functional Activities  3. Decreased Transfer Abilities  4. Decreased Ambulation Ability/Technique  5. Decreased Balance  6. Increased Pain  7. Decreased Activity Tolerance  8. Increased Fatigue  9. Decreased Flexibility/Joint Mobility  10. Edema/Girth  11. Decreased [place] with Home Exercise Program   INTERVENTIONS PLANNED: (Benefits and precautions of physical therapy have been discussed with the patient.)  1. Balance Exercise  2. Bed Mobility  3. Family Education  4. Gait Training  5. Home Exercise Program (HEP)  6. Range of Motion (ROM)  7. Therapeutic Activites  8. Therapeutic Exercise/Strengthening  9. Transfer Training     TREATMENT PLAN: Frequency/Duration: daily for duration of hospital stay  Rehabilitation Potential For Stated Goals: [address] [place] [address] (at time of discharge pending progress):    Placement:  rehab  Equipment:    None at this time              HISTORY:   History of Present Injury/Illness (Reason for Referral):  Mr. Garrett Macias is a nice 79 y/o WM sent to the ER from home on 7/15 for confusion. 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 Yesterday a friend thought he was confused and dizzy and sent him to the ER. He was in rapid AFib and met sepsis criteria. CXR negative. Cultures collected. He was started on antibiotics and a Cardizem drip and admitted. Ortho consulted. 7/17: O2 from 2L to 4L. Afebrile overnight. WBCs up a little. He is asking if he can go home today.  Overnight nurse changed knee bandage, I reviewed a picture that was taken of the surgical site and there was one circular area of erythema just distal to the knee, lateral side of incision. Had to get Cardizem bolus overnight, back in NSR this morning. Bps good. Past Medical History/Comorbidities:   Mr. George Collier  has a past medical history of Acute lumbar radiculopathy, Arthritis, Ascending aorta dilatation ([address] Atrial fibrillation with RVR ([address] Enlarged prostate, Martinez catheter in place, GERD (gastroesophageal reflux disease), High cholesterol, Hypertension, ICH (intracerebral hemorrhage) ([address] Lumbar stenosis with neurogenic claudication, Other forms of scoliosis, lumbar region, Poor historian, Psychiatric disorder, Seizures ([address] Tongue lesion, and Urinary frequency. Mr. George Collier  has a past surgical history that includes hx other surgical; hx turp (10/20/11); hx heent (8/2012); hx orthopaedic (Right); and hx other surgical.  Social History/Living Environment:   Home Environment: Private residence  # Steps to Enter: 1(4 ft ramp for dog)  One/Two Story Residence: One story  Living Alone: Yes  Support Systems: Family member(s)  Patient Expects to be Discharged to[de-identified] Skilled nursing facility  Current DME Used/Available at Home: karthikeyan Mao  Prior Level of Function/Work/Activity:  Using RW after tka     Number of Personal Factors/Comorbidities that affect the Plan of Care: 3+: HIGH COMPLEXITY   EXAMINATION:   Most Recent Physical Functioning:   Gross Assessment:                       Balance:    Bed Mobility:          Transfers:  Sit to Stand: Moderate assistance;Maximum assistance  Stand to Sit: Moderate assistance  Gait:            Body Structures Involved:  1. Bones  2. Joints  3. Muscles  4. Ligaments Body Functions Affected:  1. Movement Related Activities and Participation Affected:  1.  Mobility   Number of elements that affect the Plan of Care: 3: MODERATE COMPLEXITY   CLINICAL PRESENTATION:   Presentation: Stable and uncomplicated: LOW COMPLEXITY   CLINICAL DECISION MAKING:   [employer] AM-PAC [address] [place] [address] Mobility Inpatient Short Form  How much difficulty does the patient currently have. .. Unable A Lot A Little None   1. Turning over in bed (including adjusting bedclothes, sheets and blankets)? [] 1   [x] 2   [] 3   [] 4   2. Sitting down on and standing up from a chair with arms ( e.g., wheelchair, bedside commode, etc.)   [] 1   [x] 2   [] 3   [] 4   3. Moving from lying on back to sitting on the side of the bed? [] 1   [x] 2   [] 3   [] 4   How much help from another person does the patient currently need. .. Total A Lot A Little None   4. Moving to and from a bed to a chair (including a wheelchair)? [] 1   [x] 2   [] 3   [] 4   5. Need to walk in hospital room? [x] 1   [] 2   [] 3   [] 4   6. Climbing 3-5 steps with a railing? [x] 1   [] 2   [] 3   [] 4   © 2007, Trustees of [address] [place] [address], under license to [employer]. All rights reserved      Score:  Initial: 10 Most Recent: X (Date: -- )    Interpretation of Tool:  Represents activities that are increasingly more difficult (i.e. Bed mobility, Transfers, Gait). Medical Necessity:     · Patient is expected to demonstrate progress in   · strength, range of motion, and balance  ·  to   · decrease assistance required with exercises and functional mobility  · . Reason for Services/Other Comments:  · Patient   · continues to require present interventions due to patient's inability to perform exercises and functional mobility independently  · . Use of outcome tool(s) and clinical judgement create a POC that gives a: Questionable prediction of patient's progress: MODERATE COMPLEXITY            TREATMENT:   (In addition to Assessment/Re-Assessment sessions the following treatments were rendered)   Pre-treatment Symptoms/Complaints:  \"I'm tired but will stand again\"  Pain: Initial:  Not rated     Post Session: not rated     Therapeutic Activity: (    30 minutes): Therapeutic activities including Chair transfers to improve mobility, strength and balance.   Required minimal   to promote static balance in standing. Therapeutic Exercise: (10 Minutes):  Exercises per grid below to improve mobility, strength and balance. Required minimal verbal cues to promote proper body alignment, promote proper body posture and promote proper body mechanics. Progressed repetitions as indicated. Date:  7/21 Date:  7/23 Date:   7/20   ACTIVITY/EXERCISE AM PM AM PM AM PM   GROUP THERAPY  []  []  []  []  []  []   Ankle Pumps 20   20     Quad Sets 20   20     Gluteal Sets 20   20     Hip ABd/ADduction 20aa   20     Straight Leg Raises 20aa   20     Knee Slides 20aa   20     Short Arc Quads 20        Long Arc Quads         Chair Slides                    B = bilateral; AA = active assistive; A = active; P = passive     Braces/Orthotics/Lines/Etc:   · IV  · arnett catheter  · O2 Device: Nasal [id number]  Treatment/Session Assessment:    · Response to Treatment:  Pt agreeable to sit to stand. · Interdisciplinary Collaboration:   o Physical Therapy Assistant  o Registered Nurse  · After treatment position/precautions:   o Up in chair  o Bed/Chair-wheels locked  o Bed in low position  o Call light within reach  o RN notified   · Compliance with Program/Exercises: Will assess as treatment progresses  · Recommendations/Intent for next treatment session: \"Next visit will focus on advancements to more challenging activities and reduction in assistance provided\".   Total Treatment Duration:  PT Patient Time In/Time Out  Time In: 1530  Time Out: [place], PTA

## 2020-07-23 NOTE — PROGRESS NOTES
Problem: Pressure Injury - Risk of  Goal: *Prevention of pressure injury  Description: Document Osvaldo Scale and appropriate interventions in the flowsheet. Outcome: Progressing Towards Goal  Note: Pressure Injury Interventions:  Sensory Interventions: Monitor skin under medical devices, Pad between skin to skin, Keep linens dry and wrinkle-free, Turn and reposition approx. every two hours (pillows and wedges if needed)    Moisture Interventions: Offer toileting Q_hr, Absorbent underpads    Activity Interventions: PT/OT evaluation, Pressure redistribution bed/mattress(bed type)    Mobility Interventions: PT/OT evaluation, HOB 30 degrees or less    Nutrition Interventions: Document food/fluid/supplement intake    Friction and Shear Interventions: Apply protective barrier, creams and emollients, Feet elevated on foot rest, Minimize layers                Problem: Falls - Risk of  Goal: *Absence of Falls  Description: Document Angie Fall Risk and appropriate interventions in the flowsheet.   Outcome: Progressing Towards Goal  Note: Fall Risk Interventions:  Mobility Interventions: Bed/chair exit alarm, OT consult for ADLs, Patient to call before getting OOB, PT Consult for mobility concerns, PT Consult for assist device competence    Mentation Interventions: Adequate sleep, hydration, pain control    Medication Interventions: Bed/chair exit alarm, Evaluate medications/consider consulting pharmacy, Patient to call before getting OOB, Teach patient to arise slowly    Elimination Interventions: Call light in reach    History of Falls Interventions: Bed/chair exit alarm, Door open when patient unattended, Evaluate medications/consider consulting pharmacy, Investigate reason for fall, Room close to nurse's station

## 2020-07-23 NOTE — PROGRESS NOTES
Name: Addy Rajput MRN: [id number]  : 1934  Age:85 y.o.  male  Admit Date:  7/15/2020 LOS: 7      Hospitalist Progress Note    Addy Rajupt is a 80 y.o. male with medical history significant for major depressive disorder/anxiety, neurogenic bladder with suprapubic catheter, hypertension, GERD who was admitted from home on 7/15 for confusion. Patient recently underwent right total knee arthroplasty on . Patient was found to be confused/dizzy by a friend on  then sent him to the ED. Patient was found to have A. fib RVR on arrival and met sepsis criteria. CXR is negative. She was recollected and patient was started on IV antibiotics. Ortho was consulted. Right TKA incision site appears to be healing well therefore it is unlikely the source of sepsis. There was noted to have some purulence around suprapubic catheter but culture has been negative to date. Patient appeared to develop mild volume overload and started on IV Lasix. He noted to have EMILI with elevation of creatinine on . Cultures have been negative to date. Subjective (20) : Patient is seen and examined at bedside. Afebrile overnight. He is otherwise hemodynamically stable, saturating on 2L NC at 95-99%  Patient reports having BM x 2 yesterday    Patient denies chills, chest pains, shortness of breath, n/v, abdominal pain. ROS: 10 point review of systems is otherwise negative with the exception of the elements mentioned above.     Objective:    Patient Vitals for the past 24 hrs:   Temp Pulse Resp BP SpO2   20 1100 98.2 °F (36.8 °C) (!) 114 18 108/69 99 %   20 0719     96 %   20 0705 98.7 °F (37.1 °C) 99 18 121/74 95 %   20 0401 97.7 °F (36.5 °C) 97 18 111/70 95 %   20 2358 99.1 °F (37.3 °C) 91 16 99/64 92 %   20 2044     97 %   20     90 %   20 98.7 °F (37.1 °C) 92 18 108/64 95 %   20 1600 98.5 °F (36.9 °C) (!) 113 20 130/73 93 % Oxygen Therapy  O2 Sat (%): 99 % (07/23/20 1100)  Pulse via Oximetry: 102 beats per minute (07/23/20 0719)  O2 Device: Nasal cannula (07/23/20 0719)  O2 Flow Rate (L/min): 2 l/min (07/23/20 0719)  FIO2 (%): 28 % (07/23/20 0719)    Estimated body mass index is 31.93 kg/m² as calculated from the following:    Height as of this encounter: 6' 1\" (1.854 m). Weight as of this encounter: 109.8 kg (242 lb). Intake/Output Summary (Last 24 hours) at 7/23/2020 1551  Last data filed at 7/23/2020 1131  Gross per 24 hour   Intake 1436 ml   Output 2450 ml   Net -1014 ml       *Note that automatically entered I/Os may not be accurate; dependent on patient compliance with collection and accurate  by techs. Physical Exam:   General:     alert, awake, no acute distress. Well nourished. Obese  Head:   normocephalic, atraumatic  Eyes, Ears, nose: PERRL, EOMI. Normal conjunctiva  Neck:    supple, non-tender. Trachea midline. Lungs:   Bibasilar crackles, no wheezing  Cardiac:   Regular and tachycardic, Normal S1 and S2. Abdomen:   Soft, non distended, nontender, +BS,      (+) suprapubic catheter: site appears clean/dry without surrounding erythema   Extremities:   Warm, dry. B/l edema (R>L) with stasis dermatitis skin changes. Cooling brace to right knee. Skin:   No rashes, no jaundice  Neuro:  AAOx3.  No gross focal neurological deficit  Psychiatric:  No anxiety, calm, cooperative    Data Review:  I have reviewed all labs, meds, and studies from the last 24 hours:    Recent Results (from the past 24 hour(s))   CBC WITH AUTOMATED DIFF    Collection Time: 07/23/20  3:51 AM   Result Value Ref Range    WBC 16.0 (H) 4.3 - 11.1 K/uL    RBC 2.97 (L) 4.23 - 5.6 M/uL    HGB 9.0 (L) 13.6 - 17.2 g/dL    HCT 29.8 (L) 41.1 - 50.3 %    .3 (H) 79.6 - 97.8 FL    MCH 30.3 26.1 - 32.9 PG    MCHC 30.2 (L) 31.4 - 35.0 g/dL    RDW 13.6 11.9 - 14.6 %    PLATELET 662 704 - 423 K/uL    MPV 8.6 (L) 9.4 - 12.3 FL    ABSOLUTE NRBC 0.00 0.0 - 0.2 K/uL    DF AUTOMATED      NEUTROPHILS 75 43 - 78 %    LYMPHOCYTES 11 (L) 13 - 44 %    MONOCYTES 10 4.0 - 12.0 %    EOSINOPHILS 2 0.5 - 7.8 %    BASOPHILS 1 0.0 - 2.0 %    IMMATURE GRANULOCYTES 1 0.0 - 5.0 %    ABS. NEUTROPHILS 12.1 (H) 1.7 - 8.2 K/UL    ABS. LYMPHOCYTES 1.8 0.5 - 4.6 K/UL    ABS. MONOCYTES 1.7 (H) 0.1 - 1.3 K/UL    ABS. EOSINOPHILS 0.3 0.0 - 0.8 K/UL    ABS. BASOPHILS 0.1 0.0 - 0.2 K/UL    ABS. IMM.  GRANS. 0.1 0.0 - 0.5 K/UL   METABOLIC PANEL, BASIC    Collection Time: 07/23/20  3:51 AM   Result Value Ref Range    Sodium 137 136 - 145 mmol/L    Potassium 4.3 3.5 - 5.1 mmol/L    Chloride 104 98 - 107 mmol/L    CO2 28 21 - 32 mmol/L    Anion gap 5 (L) 7 - 16 mmol/L    Glucose 115 (H) 65 - 100 mg/dL    BUN 29 (H) 8 - 23 MG/DL    Creatinine 0.90 0.8 - 1.5 MG/DL    GFR est AA >60 >60 ml/min/1.73m2    GFR est non-AA >60 >60 ml/min/1.73m2    Calcium 8.6 8.3 - 10.4 MG/DL        All Micro Results     Procedure Component Value Units Date/Time    CULTURE, BLOOD [[id number]] Collected:  07/21/20 1724    Order Status:  Completed Specimen:  Blood Updated:  07/23/20 0854     Special Requests: --        RIGHT  Antecubital       Culture result: NO GROWTH 2 DAYS       CULTURE, BLOOD [[id number]] Collected:  07/21/20 1742    Order Status:  Completed Specimen:  Blood Updated:  07/23/20 0854     Special Requests: --        RIGHT  HAND       Culture result: NO GROWTH 2 DAYS       CULTURE, BLOOD [[id number]] Collected:  07/15/20 2249    Order Status:  Completed Specimen:  Blood Updated:  07/20/20 0726     Special Requests: --        RIGHT  Antecubital       Culture result: NO GROWTH 5 DAYS       CULTURE, BLOOD [[id number]] Collected:  07/15/20 2334    Order Status:  Completed Specimen:  Blood Updated:  07/20/20 0726     Special Requests: --        RIGHT  HAND       Culture result: NO GROWTH 5 DAYS       CULTURE, Frankie Solano STAIN [[id number]] Collected:  07/16/20 1213    Order Status:  Completed Specimen: Wound from Skin Updated:  07/19/20 0769     Special Requests: SUPRAPUBIC     GRAM STAIN 1 TO 5 WBCS SEEN PER OIF      NO DEFINITE ORGANISM SEEN        Culture result: NO GROWTH 2 DAYS       CULTURE, URINE [[id number]] Collected:  07/15/20 2320    Order Status:  Completed Specimen:  Urine Updated:  07/18/20 0837     Special Requests: NO SPECIAL REQUESTS        Culture result:       <10,000 COLONIES/mL MIXED SKIN MOE ISOLATED                Current Meds:  Current Facility-Administered Medications   Medication Dose Route Frequency    Vancomycin Level Reminder   Other ONCE    piperacillin-tazobactam (ZOSYN) 3.375 g in 0.9% sodium chloride (MBP/ADV) 100 mL  3.375 g IntraVENous Q8H    vancomycin (VANCOCIN) 1750 mg in  ml infusion  1,750 mg IntraVENous Q12H    bisacodyL (DULCOLAX) suppository 10 mg  10 mg Rectal DAILY PRN    acetaminophen (TYLENOL) tablet 1,000 mg  1,000 mg Oral Q6H PRN    sodium chloride (NS) flush 5-40 mL  5-40 mL IntraVENous Q8H    sodium chloride (NS) flush 5-40 mL  5-40 mL IntraVENous PRN    HYDROcodone-acetaminophen (NORCO) 5-325 mg per tablet 1 Tab  1 Tab Oral Q4H PRN    naloxone (NARCAN) injection 0.4 mg  0.4 mg IntraVENous PRN    ondansetron (ZOFRAN) injection 4 mg  4 mg IntraVENous Q4H PRN    senna-docusate (PERICOLACE) 8.6-50 mg per tablet 2 Tab  2 Tab Oral DAILY PRN    LORazepam (ATIVAN) tablet 0.5 mg  0.5 mg Oral Q4H PRN    zolpidem (AMBIEN) tablet 5 mg  5 mg Oral QHS PRN    aspirin delayed-release tablet 81 mg  81 mg Oral BID    citalopram (CELEXA) tablet 20 mg  20 mg Oral DAILY    [Held by provider] losartan (COZAAR) tablet 25 mg  25 mg Oral QHS    metoprolol succinate (TOPROL-XL) XL tablet 100 mg  100 mg Oral QHS    pantoprazole (PROTONIX) tablet 40 mg  40 mg Oral ACB    oxyCODONE IR (ROXICODONE) tablet 5 mg  5 mg Oral Q4H PRN    polyethylene glycol (MIRALAX) packet 17 g  17 g Oral DAILY    senna-docusate (PERICOLACE) 8.6-50 mg per tablet 1 Tab  1 Tab Oral DAILY  traZODone (DESYREL) tablet 100 mg  100 mg Oral QHS PRN    metoprolol (LOPRESSOR) injection 5 mg  5 mg IntraVENous Q4H PRN         Other Studies:  Xr Chest Port    Result Date: 7/15/2020  EXAM: Chest x-ray. INDICATION: Dyspnea. COMPARISON: None. TECHNIQUE: Single frontal view. FINDINGS: The lungs are clear. The heart is enlarged. Mediastinal contour and pulmonary vasculature are within normal limits. No pneumothorax or pleural effusion is seen. IMPRESSION: 1. No acute process. 2. Cardiomegaly. Xr Knee Rt 3 V    Result Date: 7/16/2020  Portable right knee  7/16/2020 1:20 PM Indication: Patient immediately status post TKA Comparison: None available at this hospital PACS Findings: Portable AP, oblique and crosstable lateral views from 1246 are submitted. There is an anteriorly located vertically oriented skin staple line. There is soft tissues swelling and trace collections of air in the soft tissues and joints. No unexpected radiopaque foreign body. TKA prosthesis components appear in appropriate alignment with no acute complicating features. No bony fracture. Impression: Expected immediate postop TKA findings. No acute complicating features. Duplex Lower Ext Venous Right    Result Date: 7/16/2020  RIGHT LOWER EXTREMITY DEEP VENOUS SONOGRAPHY: CLINICAL HISTORY: Leg pain and swelling after recent right knee surgery. FINDINGS: Multiple images from real time ultrasound evaluation of the deep venous system of the right leg demonstrate normal venous flow in the posterior tibial, popliteal, and superficial and common femoral veins. Normal compressibility was demonstrated. Flow was also documented in the proximal saphenous and deep femoral veins. No intraluminal echogenic material was seen to suggest the presence of nonobstructive thrombus. IMPRESSION: NO ULTRASOUND EVIDENCE OF DEEP VENOUS THROMBOSIS IN THE RIGHT LEG. Assessment and Plan:     Active Hospital Problems    Diagnosis Date Noted    EMILI (acute kidney injury) ([facility] 07/20/2020    Atrial fibrillation with RVR ([facility] 07/16/2020    Sepsis ([facility] 07/16/2020    Neurogenic bladder 07/16/2020    GERD (gastroesophageal reflux disease)      controlled w/med      Hypertension      Plan:    Sepsis likely from suprapubic cath site   All cultures have been neg to date. Febrile episode with Tmax of 102.3 on 7/22 overnight while on PO keflex/doxy since 7/18. Continues to have leukocytosis, febrile episode, elevated heart rate meeting SIRS criteria for sepsis of unknown etiology. Repeat UA on 7/21 is negative for UTI. Repeat CXR (7/22) without any new consolidation/infiltrates  -Broaden antibiotics to IV thank plus IV Zosyn for now  -Repeat blood culture(7/21) pending    Hypoxia likely due to underlying sepsis and volume overload  S/p lasix IV with -18L of net fluid balance. COVID test neg on 7/19  - O2 supplement and wean as tolerated      EMILI(resolved)  - monitor    Afib RVR (resolved)  Likely due to acute illness/sepsis. Now sinus rhythm with possible PVC or aberrant complexes  - continue with metoprolol XL 100mg qHS  - no AC for now given recent procedure     MDD/Anxiety  - continue with celexa, PRN ativan. - per notes, he had suicidal ideation a few nights ago but he has denied any SI to me and during psych consult    Hypertension: continue with home medications  GERD: continue with PPI    Diet:  DIET REGULAR  DVT PPx: SCDs  Code: Full Code  Dispo: SNF pending  Estimated Discharge:  72 hrs if remains afebrile    Labs/Imaging Reviewed. Patient is high risk due to current condition and comorbid conditions as well as requiring frequent monitoring. Plan discussed with staff, patient/family and are in agreement. Time Spent: Greater than 45 minutes was spent in reviewing charts, physical exam, discussion with patient, and answered any questions.     Signed By: Delena Curling, MD     July 23, 2020

## 2020-07-23 NOTE — PROGRESS NOTES
Pt in bed resting quietly. Alert and oriented x4. Denies pain. Dressing to right knee is clean, dry and intact. Suprapubic catheter in place. Tele monitor on box #[id number]. Bed in low and locked position with call light within reach.

## 2020-07-23 NOTE — PROGRESS NOTES
07/22/20 2044   Oxygen Therapy   O2 Sat (%) 97 %   Pulse via Oximetry 108 beats per minute   O2 Device Nasal cannula   O2 Flow Rate (L/min) 2 l/min   Patient placed on continuous sat monitor. Monitor history deleted prior to placing on patient.  Pt on NC 2lpm.

## 2020-07-24 LAB
ANION GAP SERPL CALC-SCNC: 6 MMOL/L (ref 7–16)
BASOPHILS # BLD: 0.1 K/UL (ref 0–0.2)
BASOPHILS NFR BLD: 1 % (ref 0–2)
BUN SERPL-MCNC: 28 MG/DL (ref 8–23)
CALCIUM SERPL-MCNC: 8.4 MG/DL (ref 8.3–10.4)
CHLORIDE SERPL-SCNC: 104 MMOL/L (ref 98–107)
CO2 SERPL-SCNC: 28 MMOL/L (ref 21–32)
CREAT SERPL-MCNC: 1.13 MG/DL (ref 0.8–1.5)
DIFFERENTIAL METHOD BLD: ABNORMAL
EOSINOPHIL # BLD: 0.4 K/UL (ref 0–0.8)
EOSINOPHIL NFR BLD: 3 % (ref 0.5–7.8)
ERYTHROCYTE [DISTWIDTH] IN BLOOD BY AUTOMATED COUNT: 13.5 % (ref 11.9–14.6)
GLUCOSE SERPL-MCNC: 130 MG/DL (ref 65–100)
HCT VFR BLD AUTO: 29.7 % (ref 41.1–50.3)
HGB BLD-MCNC: 8.9 G/DL (ref 13.6–17.2)
IMM GRANULOCYTES # BLD AUTO: 0.2 K/UL (ref 0–0.5)
IMM GRANULOCYTES NFR BLD AUTO: 1 % (ref 0–5)
LYMPHOCYTES # BLD: 1.5 K/UL (ref 0.5–4.6)
LYMPHOCYTES NFR BLD: 11 % (ref 13–44)
MCH RBC QN AUTO: 30.5 PG (ref 26.1–32.9)
MCHC RBC AUTO-ENTMCNC: 30 G/DL (ref 31.4–35)
MCV RBC AUTO: 101.7 FL (ref 79.6–97.8)
MONOCYTES # BLD: 1.5 K/UL (ref 0.1–1.3)
MONOCYTES NFR BLD: 11 % (ref 4–12)
NEUTS SEG # BLD: 10.3 K/UL (ref 1.7–8.2)
NEUTS SEG NFR BLD: 74 % (ref 43–78)
NRBC # BLD: 0 K/UL (ref 0–0.2)
PLATELET # BLD AUTO: 371 K/UL (ref 150–450)
PMV BLD AUTO: 8.9 FL (ref 9.4–12.3)
POTASSIUM SERPL-SCNC: 3.7 MMOL/L (ref 3.5–5.1)
RBC # BLD AUTO: 2.92 M/UL (ref 4.23–5.6)
SODIUM SERPL-SCNC: 138 MMOL/L (ref 136–145)
VANCOMYCIN TROUGH SERPL-MCNC: 23.1 UG/ML (ref 5–20)
WBC # BLD AUTO: 14 K/UL (ref 4.3–11.1)

## 2020-07-24 PROCEDURE — 74011250636 HC RX REV CODE- 250/636: Performed by: INTERNAL MEDICINE

## 2020-07-24 PROCEDURE — 80048 BASIC METABOLIC PNL TOTAL CA: CPT

## 2020-07-24 PROCEDURE — 94762 N-INVAS EAR/PLS OXIMTRY CONT: CPT

## 2020-07-24 PROCEDURE — 36415 COLL VENOUS BLD VENIPUNCTURE: CPT

## 2020-07-24 PROCEDURE — 74011250637 HC RX REV CODE- 250/637: Performed by: INTERNAL MEDICINE

## 2020-07-24 PROCEDURE — 3331090002 HH PPS REVENUE DEBIT

## 2020-07-24 PROCEDURE — 74011000258 HC RX REV CODE- 258: Performed by: INTERNAL MEDICINE

## 2020-07-24 PROCEDURE — 3331090001 HH PPS REVENUE CREDIT

## 2020-07-24 PROCEDURE — 65270000029 HC RM PRIVATE

## 2020-07-24 PROCEDURE — 80202 ASSAY OF VANCOMYCIN: CPT

## 2020-07-24 PROCEDURE — 85025 COMPLETE CBC W/AUTO DIFF WBC: CPT

## 2020-07-24 PROCEDURE — 97535 SELF CARE MNGMENT TRAINING: CPT

## 2020-07-24 PROCEDURE — 97530 THERAPEUTIC ACTIVITIES: CPT

## 2020-07-24 RX ORDER — VANCOMYCIN HYDROCHLORIDE
1250 EVERY 12 HOURS
Status: COMPLETED | OUTPATIENT
Start: 2020-07-24 | End: 2020-07-25

## 2020-07-24 RX ORDER — VANCOMYCIN HYDROCHLORIDE
1250 EVERY 12 HOURS
Status: DISCONTINUED | OUTPATIENT
Start: 2020-07-24 | End: 2020-07-24

## 2020-07-24 RX ADMIN — LORAZEPAM 0.5 MG: 0.5 TABLET ORAL at 07:49

## 2020-07-24 RX ADMIN — VANCOMYCIN HYDROCHLORIDE 1250 MG: 10 INJECTION, POWDER, LYOPHILIZED, FOR SOLUTION INTRAVENOUS at 12:08

## 2020-07-24 RX ADMIN — PIPERACILLIN AND TAZOBACTAM 3.38 G: 3; .375 INJECTION, POWDER, LYOPHILIZED, FOR SOLUTION INTRAVENOUS at 15:56

## 2020-07-24 RX ADMIN — METOPROLOL SUCCINATE 100 MG: 50 TABLET, EXTENDED RELEASE ORAL at 22:14

## 2020-07-24 RX ADMIN — PIPERACILLIN AND TAZOBACTAM 3.38 G: 3; .375 INJECTION, POWDER, LYOPHILIZED, FOR SOLUTION INTRAVENOUS at 07:51

## 2020-07-24 RX ADMIN — HYDROCODONE BITARTRATE AND ACETAMINOPHEN 1 TABLET: 5; 325 TABLET ORAL at 22:18

## 2020-07-24 RX ADMIN — LORAZEPAM 0.5 MG: 0.5 TABLET ORAL at 22:18

## 2020-07-24 RX ADMIN — CITALOPRAM HYDROBROMIDE 20 MG: 20 TABLET ORAL at 07:49

## 2020-07-24 RX ADMIN — ASPIRIN 81 MG: 81 TABLET, COATED ORAL at 15:55

## 2020-07-24 RX ADMIN — HYDROCODONE BITARTRATE AND ACETAMINOPHEN 1 TABLET: 5; 325 TABLET ORAL at 16:03

## 2020-07-24 RX ADMIN — HYDROCODONE BITARTRATE AND ACETAMINOPHEN 1 TABLET: 5; 325 TABLET ORAL at 07:49

## 2020-07-24 RX ADMIN — LORAZEPAM 0.5 MG: 0.5 TABLET ORAL at 16:03

## 2020-07-24 RX ADMIN — VANCOMYCIN HYDROCHLORIDE 1250 MG: 10 INJECTION, POWDER, LYOPHILIZED, FOR SOLUTION INTRAVENOUS at 22:18

## 2020-07-24 RX ADMIN — PANTOPRAZOLE SODIUM 40 MG: 40 TABLET, DELAYED RELEASE ORAL at 07:49

## 2020-07-24 RX ADMIN — ASPIRIN 81 MG: 81 TABLET, COATED ORAL at 07:50

## 2020-07-24 NOTE — PROGRESS NOTES
Name: Em House MRN: [id number]  : 1934  Age:85 y.o.  male  Admit Date:  7/15/2020 LOS: 8      Hospitalist Progress Note    Em House is a 80 y.o. male with medical history significant for major depressive disorder/anxiety, neurogenic bladder with suprapubic catheter, hypertension, GERD who was admitted from home on 7/15 for confusion. Patient recently underwent right total knee arthroplasty on . Patient was found to be confused/dizzy by a friend on  then sent him to the ED. Patient was found to have A. fib RVR on arrival and met sepsis criteria. CXR is negative. She was recollected and patient was started on IV antibiotics. Ortho was consulted. Right TKA incision site appears to be healing well therefore it is unlikely the source of sepsis. There was noted to have some purulence around suprapubic catheter but culture has been negative to date. Patient appeared to develop mild volume overload and started on IV Lasix. He noted to have EMILI with elevation of creatinine on . He had episodes of fever spikes with T-max of 102.3  while on p.o. Keflex/doxycycline. Antibiotics was broadened to IV vancomycin and Zosyn and cultures were repeated. The blood cultures and urine cultures have been negative to date. Chest x-ray did not reveal any acute infiltrate. Subjective (20) :  Patient is seen and examined at bedside. Febrile x36 hours  Patient was on room air without any respiratory distress. Patient denies chills, chest pains, shortness of breath, n/v, abdominal pain. ROS: 10 point review of systems is otherwise negative with the exception of the elements mentioned above.     Objective:    Patient Vitals for the past 24 hrs:   Temp Pulse Resp BP SpO2   20 1155 98.1 °F (36.7 °C) 73 16 104/62 97 %   20 0744 97.8 °F (36.6 °C) 67 16 122/67 93 %   20 0354 97.3 °F (36.3 °C) (!) 57 16 112/68 95 %   20 2340 97.8 °F (36.6 °C) 67 16 117/74 99 %   07/23/20 1900 97.6 °F (36.4 °C) 83 18 116/65 96 %   07/23/20 1600 97.5 °F (36.4 °C) (!) 112 18 120/71 98 %     Oxygen Therapy  O2 Sat (%): 97 % (07/24/20 1155)  Pulse via Oximetry: 102 beats per minute (07/23/20 0719)  O2 Device: Room air (07/24/20 0750)  O2 Flow Rate (L/min): 2 l/min (07/23/20 0719)  FIO2 (%): 28 % (07/23/20 0719)    Estimated body mass index is 31.93 kg/m² as calculated from the following:    Height as of this encounter: 6' 1\" (1.854 m). Weight as of this encounter: 109.8 kg (242 lb). Intake/Output Summary (Last 24 hours) at 7/24/2020 1231  Last data filed at 7/24/2020 0654  Gross per 24 hour   Intake 360 ml   Output 1050 ml   Net -690 ml       *Note that automatically entered I/Os may not be accurate; dependent on patient compliance with collection and accurate  by techs. Physical Exam:   General:     alert, awake, no acute distress. Well nourished. Obese  Head:   normocephalic, atraumatic  Eyes, Ears, nose: PERRL, EOMI. Normal conjunctiva  Neck:    supple, non-tender. Trachea midline. Lungs:   Bibasilar crackles, no wheezing  Cardiac:   RRR, Normal S1 and S2. Abdomen:   Soft, non distended, nontender, +BS,      (+) suprapubic catheter: site appears clean/dry without surrounding erythema   Extremities:   Warm, dry. B/l edema (R>L) with stasis dermatitis skin changes. Cooling brace to right knee. Skin:   No rashes, no jaundice  Neuro:  AAOx3.  No gross focal neurological deficit  Psychiatric:  No anxiety, calm, cooperative    Data Review:  I have reviewed all labs, meds, and studies from the last 24 hours:    Recent Results (from the past 24 hour(s))   VANCOMYCIN, TROUGH    Collection Time: 07/23/20  9:34 PM   Result Value Ref Range    Vancomycin,trough 22.3 (HH) 5 - 20 ug/mL   CBC WITH AUTOMATED DIFF    Collection Time: 07/24/20  3:36 AM   Result Value Ref Range    WBC 14.0 (H) 4.3 - 11.1 K/uL    RBC 2.92 (L) 4.23 - 5.6 M/uL    HGB 8.9 (L) 13.6 - 17.2 g/dL    HCT 29.7 (L) 41.1 - 50.3 %    .7 (H) 79.6 - 97.8 FL    MCH 30.5 26.1 - 32.9 PG    MCHC 30.0 (L) 31.4 - 35.0 g/dL    RDW 13.5 11.9 - 14.6 %    PLATELET 710 702 - 340 K/uL    MPV 8.9 (L) 9.4 - 12.3 FL    ABSOLUTE NRBC 0.00 0.0 - 0.2 K/uL    DF AUTOMATED      NEUTROPHILS 74 43 - 78 %    LYMPHOCYTES 11 (L) 13 - 44 %    MONOCYTES 11 4.0 - 12.0 %    EOSINOPHILS 3 0.5 - 7.8 %    BASOPHILS 1 0.0 - 2.0 %    IMMATURE GRANULOCYTES 1 0.0 - 5.0 %    ABS. NEUTROPHILS 10.3 (H) 1.7 - 8.2 K/UL    ABS. LYMPHOCYTES 1.5 0.5 - 4.6 K/UL    ABS. MONOCYTES 1.5 (H) 0.1 - 1.3 K/UL    ABS. EOSINOPHILS 0.4 0.0 - 0.8 K/UL    ABS. BASOPHILS 0.1 0.0 - 0.2 K/UL    ABS. IMM.  GRANS. 0.2 0.0 - 0.5 K/UL   METABOLIC PANEL, BASIC    Collection Time: 07/24/20  3:36 AM   Result Value Ref Range    Sodium 138 136 - 145 mmol/L    Potassium 3.7 3.5 - 5.1 mmol/L    Chloride 104 98 - 107 mmol/L    CO2 28 21 - 32 mmol/L    Anion gap 6 (L) 7 - 16 mmol/L    Glucose 130 (H) 65 - 100 mg/dL    BUN 28 (H) 8 - 23 MG/DL    Creatinine 1.13 0.8 - 1.5 MG/DL    GFR est AA >60 >60 ml/min/1.73m2    GFR est non-AA >60 >60 ml/min/1.73m2    Calcium 8.4 8.3 - 10.4 MG/DL   [place], TROUGH    Collection Time: 07/24/20 10:12 AM   Result Value Ref Range    Vancomycin,trough 23.1 (HH) 5 - 20 ug/mL        All Micro Results     Procedure Component Value Units Date/Time    CULTURE, BLOOD [[id number]] Collected:  07/21/20 4424    Order Status:  Completed Specimen:  Blood Updated:  07/23/20 0854     Special Requests: --        RIGHT  Antecubital       Culture result: NO GROWTH 2 DAYS       CULTURE, BLOOD [[id number]] Collected:  07/21/20 1742    Order Status:  Completed Specimen:  Blood Updated:  07/23/20 0854     Special Requests: --        RIGHT  HAND       Culture result: NO GROWTH 2 DAYS       CULTURE, BLOOD [[id number]] Collected:  07/15/20 2249    Order Status:  Completed Specimen:  Blood Updated:  07/20/20 0726     Special Requests: --        RIGHT  Antecubital       Culture result: NO GROWTH 5 DAYS       CULTURE, BLOOD [[id number]] Collected:  07/15/20 2334    Order Status:  Completed Specimen:  Blood Updated:  07/20/20 0726     Special Requests: --        RIGHT  HAND       Culture result: NO GROWTH 5 DAYS       CULTURE, Ermalinda Rued STAIN [[id number]] Collected:  07/16/20 1213    Order Status:  Completed Specimen:  Wound from Skin Updated:  07/19/20 0730     Special Requests: SUPRAPUBIC     GRAM STAIN 1 TO 5 WBCS SEEN PER OIF      NO DEFINITE ORGANISM SEEN        Culture result: NO GROWTH 2 DAYS       CULTURE, URINE [[id number]] Collected:  07/15/20 2321    Order Status:  Completed Specimen:  Urine Updated:  07/18/20 0837     Special Requests: NO SPECIAL REQUESTS        Culture result:       <10,000 COLONIES/mL MIXED SKIN MOE ISOLATED                Current Meds:  Current Facility-Administered Medications   Medication Dose Route Frequency    vancomycin (VANCOCIN) 1250 mg in  ml infusion  1,250 mg IntraVENous Q12H    piperacillin-tazobactam (ZOSYN) 3.375 g in 0.9% sodium chloride (MBP/ADV) 100 mL  3.375 g IntraVENous Q8H    bisacodyL (DULCOLAX) suppository 10 mg  10 mg Rectal DAILY PRN    acetaminophen (TYLENOL) tablet 1,000 mg  1,000 mg Oral Q6H PRN    sodium chloride (NS) flush 5-40 mL  5-40 mL IntraVENous Q8H    sodium chloride (NS) flush 5-40 mL  5-40 mL IntraVENous PRN    HYDROcodone-acetaminophen (NORCO) 5-325 mg per tablet 1 Tab  1 Tab Oral Q4H PRN    naloxone (NARCAN) injection 0.4 mg  0.4 mg IntraVENous PRN    ondansetron (ZOFRAN) injection 4 mg  4 mg IntraVENous Q4H PRN    senna-docusate (PERICOLACE) 8.6-50 mg per tablet 2 Tab  2 Tab Oral DAILY PRN    LORazepam (ATIVAN) tablet 0.5 mg  0.5 mg Oral Q4H PRN    zolpidem (AMBIEN) tablet 5 mg  5 mg Oral QHS PRN    aspirin delayed-release tablet 81 mg  81 mg Oral BID    citalopram (CELEXA) tablet 20 mg  20 mg Oral DAILY    losartan (COZAAR) tablet 25 mg  25 mg Oral QHS    metoprolol succinate (TOPROL-XL) XL tablet 100 mg  100 mg Oral QHS    pantoprazole (PROTONIX) tablet 40 mg  40 mg Oral ACB    oxyCODONE IR (ROXICODONE) tablet 5 mg  5 mg Oral Q4H PRN    polyethylene glycol (MIRALAX) packet 17 g  17 g Oral DAILY    senna-docusate (PERICOLACE) 8.6-50 mg per tablet 1 Tab  1 Tab Oral DAILY    traZODone (DESYREL) tablet 100 mg  100 mg Oral QHS PRN    metoprolol (LOPRESSOR) injection 5 mg  5 mg IntraVENous Q4H PRN         Other Studies:  Xr Chest Port    Result Date: 7/15/2020  EXAM: Chest x-ray. INDICATION: Dyspnea. COMPARISON: None. TECHNIQUE: Single frontal view. FINDINGS: The lungs are clear. The heart is enlarged. Mediastinal contour and pulmonary vasculature are within normal limits. No pneumothorax or pleural effusion is seen. IMPRESSION: 1. No acute process. 2. Cardiomegaly. Xr Knee Rt 3 V    Result Date: 7/16/2020  Portable right knee  7/16/2020 1:20 PM Indication: Patient immediately status post TKA Comparison: None available at this hospital PACS Findings: Portable AP, oblique and crosstable lateral views from 1246 are submitted. There is an anteriorly located vertically oriented skin staple line. There is soft tissues swelling and trace collections of air in the soft tissues and joints. No unexpected radiopaque foreign body. TKA prosthesis components appear in appropriate alignment with no acute complicating features. No bony fracture. Impression: Expected immediate postop TKA findings. No acute complicating features. Duplex Lower Ext Venous Right    Result Date: 7/16/2020  RIGHT LOWER EXTREMITY DEEP VENOUS SONOGRAPHY: CLINICAL HISTORY: Leg pain and swelling after recent right knee surgery. FINDINGS: Multiple images from real time ultrasound evaluation of the deep venous system of the right leg demonstrate normal venous flow in the posterior tibial, popliteal, and superficial and common femoral veins. Normal compressibility was demonstrated.  Flow was also documented in the proximal saphenous and deep femoral veins. No intraluminal echogenic material was seen to suggest the presence of nonobstructive thrombus. IMPRESSION: NO ULTRASOUND EVIDENCE OF DEEP VENOUS THROMBOSIS IN THE RIGHT LEG. Assessment and Plan: Active Hospital Problems    Diagnosis Date Noted    EMILI (acute kidney injury) ([facility] 07/20/2020    Atrial fibrillation with RVR ([facility] [address] 07/16/2020    Sepsis ([facility] [address] 07/16/2020    Neurogenic bladder 07/16/2020    GERD (gastroesophageal reflux disease)      controlled w/med      Hypertension      Plan:    Sepsis likely from suprapubic cath site   All cultures have been neg to date. Febrile episode with Tmax of 102.3 on 7/22 overnight while on PO keflex/doxy since 7/18. Continues to have leukocytosis, febrile episode, elevated heart rate meeting SIRS criteria for sepsis of unknown etiology. Repeat UA on 7/21 is negative for UTI. Repeat CXR (7/22) without any new consolidation/infiltrates  -Broaden antibiotics to IV thank plus IV Zosyn for now. Will dc vancomycin if BCx is neg after today  -Repeat blood culture(7/21) neg to date    Hypoxia likely due to underlying sepsis and volume overload  S/p lasix IV with -18L of net fluid balance. COVID test neg on 7/19  - O2 supplement and wean as tolerated    EMILI(resolved)  - monitor    Afib RVR (resolved)  Likely due to acute illness/sepsis. Now sinus rhythm with possible PVC or aberrant complexes  - continue with metoprolol XL 100mg qHS  - no AC for now given recent procedure     MDD/Anxiety  - continue with celexa, PRN ativan. - per notes, he had suicidal ideation a few nights ago but he has denied any SI to me and during psych consult    Hypertension: continue with home medications  GERD: continue with PPI    Diet:  DIET REGULAR  DVT PPx: SCDs  Code: Full Code  Dispo: SNF vs 9th floor pending  Estimated Discharge:  72 hrs if remains afebrile    Labs/Imaging Reviewed.    Patient is moderate risk due to current condition and comorbid conditions as well as requiring frequent monitoring. Plan discussed with staff, patient/family and are in agreement. Time Spent: Greater than 45 minutes was spent in reviewing charts, physical exam, discussion with patient, and answered any questions.     Signed By: Luiz Bills MD     July 24, 2020

## 2020-07-24 NOTE — PROGRESS NOTES
Problem: Self Care Deficits Care Plan (Adult)  Goal: *Acute Goals and Plan of Care (Insert Text)  Description: GOALS:   DISCHARGE GOALS (in preparation for going home/rehab):  3 days  1. Mr. Garrett Macias will perform one lower body dressing activity with modeate assistance required to demonstrate improved functional mobility and safety. 2.  Mr. Garrett Macias will perform one lower body bathing activity with minimal assistance required to demonstrate improved functional mobility and safety. 3.  Mr. Garrett Macias will perform toileting/toilet transfer with contact guard assistance to demonstrate improved functional mobility and safety. 4.  Mr. Garrett Macias will perform shower transfer with contact guard assistance to demonstrate improved functional mobility and safety. JOINT CAMP OCCUPATIONAL THERAPY TKA: Daily Note 7/24/2020  INPATIENT: Daily note:4  Pay[place] / [place] / Product Type: Managed Care Medicare /      NAME/AGE/GENDER: Marley Schwarz is a 80 y.o. male   PRIMARY DIAGNOSIS:  * No surgery found *   * Surgery not found * (Cannot find OR record)  ICD-10: Treatment Diagnosis:    · Pain in Right Knee (M25.561)  · Stiffness of Right Knee, Not elsewhere classified (I07.568)      ASSESSMENT:     Mr. Garrett Macias is s/p Right TKA last week and returned and presents with decreased weight bearing on R LE and decreased independence with functional mobility and activities of daily living as compared to baseline level of function and safety. Max assist into stance. Normally patient lives alone and has help from friend at times for self cares. Walks with rollator and limited mobility prior to surgery. High pain. Fatigues quickly. 7/23/2020 Able to shave sitting up in bed with moderate assist, patient a little ataxic. Stood from raised bed and transferred to recliner with max assist. Will be a long recovery.      7/24/2020 Pt was sitting in chair upon arrival. Pt completed functional mobility with min A using rolling walker. Pt stood at sink and completed grooming with set up. Pt is progressing towards goals. Continue POC. This section established at most recent assessment   PROBLEM LIST (Impairments causing functional limitations):  1. Decreased Strength  2. Decreased ADL/Functional Activities  3. Decreased Transfer Abilities  4. Increased Pain  5. Increased Fatigue  6. Decreased Flexibility/Joint Mobility  7. Decreased Knowledge of Precautions   INTERVENTIONS PLANNED: (Benefits and precautions of occupational therapy have been discussed with the patient.)  1. Activities of daily living training  2. Adaptive equipment training  3. Balance training  4. Clothing management  5. Donning&doffing training  6. Therapeutic activity  7. Therapeutic exercise     TREATMENT PLAN: Frequency/Duration: Follow patient 4x a week to address above goals. Rehabilitation Potential For Stated Goals: Good     RECOMMENDED REHABILITATION/EQUIPMENT: (at time of discharge pending progress): Continue Skilled Therapy. OCCUPATIONAL PROFILE AND HISTORY:   History of Present Injury/Illness (Reason for Referral): Pt presents this date s/p (Right) TKA 7/13  Past Medical History/Comorbidities:   Mr. Shelley Greer  has a past medical history of Acute lumbar radiculopathy, Arthritis, Ascending aorta dilatation ([address] Atrial fibrillation with RVR ([address] Enlarged prostate, Martinez catheter in place, GERD (gastroesophageal reflux disease), High cholesterol, Hypertension, ICH (intracerebral hemorrhage) ([address] Lumbar stenosis with neurogenic claudication, Other forms of scoliosis, lumbar region, Poor historian, Psychiatric disorder, Seizures ([address] Tongue lesion, and Urinary frequency.   Mr. Shelley Greer  has a past surgical history that includes hx other surgical; hx turp (10/20/11); hx heent (8/2012); hx orthopaedic (Right); and hx other surgical.  Social History/Living Environment:   Home Environment: Private residence  # Steps to Enter: 1(4 ft ramp for dog)  One/Two Story Residence: One story  Living Alone: Yes  Support Systems: Family member(s)  Patient Expects to be Discharged to[de-identified] Skilled nursing facility  Current DME Used/Available at Home: karthikeyan Glover  Prior Level of Function/Work/Activity:  Uses rollator. Assist with bathing and IADL's. Number of Personal Factors/Comorbidities that affect the Plan of Care: Expanded review of therapy/medical records (1-2):  MODERATE COMPLEXITY   ASSESSMENT OF OCCUPATIONAL PERFORMANCE[de-identified]   Most Recent Physical Functioning:   Balance  Sitting: Intact; Without support  Standing: Impaired; With support                  LLE Assessment  LLE Assessment (WDL): Within defined limits           Mental Status  Neurologic State: Alert  Orientation Level: Oriented X4  Cognition: Appropriate decision making; Appropriate for age attention/concentration  Perception: Tactile;Verbal  Perseveration: Tactile cues provided;Verbal cues provided  Safety/Judgement: Fall prevention          RLE Assessment  RLE Assessment (WDL): Exceptions to WDL  RLE PROM  R Knee Flexion: 100  R Knee Extension: -15     Basic ADLs (From Assessment) Complex ADLs (From Assessment)   Basic ADL  Type of Bath: Patient refused     Grooming/Bathing/Dressing Activities of Daily Living   Grooming  Washing Face: Set-up  Brushing Teeth: Set-up Cognitive Retraining  Safety/Judgement: Fall prevention                       Bed/Mat Mobility  Supine to Sit: Stand-by assistance  Sit to Supine: (NT)  Sit to Stand: Minimum assistance  Stand to Sit: Minimum assistance  Bed to Chair: Minimum assistance(with walker)  Scooting: Contact guard assistance         Physical Skills Involved:  1. Range of Motion  2. Balance  3. Strength  4. Activity Tolerance  5. Fine Motor Control  6. Gross Motor Control  7. Pain (acute) Cognitive Skills Affected (resulting in the inability to perform in a timely and safe manner):  1.  [facility] Affected:  1. Habits/Routines  2. Environmental Adaptation  3. Social Interaction   Number of elements that affect the Plan of Care: 3-5:  MODERATE COMPLEXITY   CLINICAL DECISION MAKIN[address] [place] [address] AM-PAC 6 Clicks   Daily Activity Inpatient Short Form  How much help from another person does the patient currently need. .. Total A Lot A Little None   1. Putting on and taking off regular lower body clothing? [x] 1   [] 2   [] 3   [] 4   2. Bathing (including washing, rinsing, drying)? [x] 1   [] 2   [] 3   [] 4   3. Toileting, which includes using toilet, bedpan or urinal?   [x] 1   [] 2   [] 3   [] 4   4. Putting on and taking off regular upper body clothing? [] 1   [] 2   [x] 3   [] 4   5. Taking care of personal grooming such as brushing teeth? [] 1   [] 2   [x] 3   [] 4   6. Eating meals? [] 1   [] 2   [x] 3   [] 4   © , Trustees of [address] [place] [address], under license to [employer]. All rights reserved     Score:  Initial: 12 Most Recent: X (Date: -- )    Interpretation of Tool:  Represents activities that are increasingly more difficult (i.e. Bed mobility, Transfers, Gait). Medical Necessity:     · Skilled intervention continues to be required due to Deficits listed above. Reason for Services/Other Comments:  · Patient continues to require skilled intervention due to   · Dx above  · . Use of outcome tool(s) and clinical judgement create a POC that gives a: MODERATE COMPLEXITY            TREATMENT:   (In addition to Assessment/Re-Assessment sessions the following treatments were rendered)     Pre-treatment Symptoms/Complaints:    Pain: Initial:   Pain Intensity 1: 4  Pain Location 1: Knee  Pain Intervention(s) 1: Repositioned  Post Session:  7     Self Care: (30): Procedure(s) (per grid) utilized to improve and/or restore self-care/home management as related to grooming.  Required min verbal and manual cueing to facilitate activities of daily living skills and compensatory activities    Treatment/Session Assessment:     Response to Treatment:  Good, up in recliner. Education:  [] Home Exercises  [x] Fall Precautions  [] Hip Precautions [] Going Home Video  [x] Knee/Hip Prosthesis Review  [x] Walker Management/Safety [x] Adaptive Equipment as Needed       Interdisciplinary Collaboration:   o Physical Therapist  o Certified Occupational Therapy Assistant  o Registered Nurse    After treatment position/precautions:   o Up in chair  o Bed/Chair-wheels locked  o Caregiver at bedside  o Call light within reach  o RN notified     Compliance with Program/Exercises: Compliant all of the time, Will assess as treatment progresses. Recommendations/Intent for next treatment session:  Treatment next visit will focus on increasing Mr. Samantha Lopez independence with bed mobility, transfers, self care, functional mobility, modalities for pain, and patient education.       Total Treatment Duration:  OT Patient Time In/Time Out  Time In: 1330  Time Out:   Kassandra Addison

## 2020-07-24 NOTE — PROGRESS NOTES
Care Management Interventions  PCP Verified by CM: Yes  Mode of Transport at Discharge: BLS  Transition of Care Consult (CM Consult): Other(Acute Rehab)  [address]: Yes  Partner SNF: Yes  Physical Therapy Consult: Yes  Occupational Therapy Consult: Yes  Current Support Network: Lives Alone  Confirm Follow Up Transport: Other (see comment)(stretcher)  The Plan for Transition of Care is Related to the Following Treatment Goals : return to independent function  The Patient and/or Patient Representative was Provided with a Choice of Provider and Agrees with the Discharge Plan?: Yes  Freedom of Choice List was Provided with Basic Dialogue that Supports the Patient's Individualized Plan of Care/Goals, Treatment Preferences and Shares the Quality Data Associated with the Providers?: Yes  Discharge Location  Discharge Placement: Rehab hospital/unit acute    Spoke with [facility] w/ 9th floor rehab. They have accepted pt for admission pending ins approval.  Precert initiated today -Friday. 7-24   It may be Monday before we hear back from his insurance due to the weekend.  Yaneli Gilliland on 9th confirmed that they can honor his neg  COVID test result resulted this  week

## 2020-07-24 NOTE — PROGRESS NOTES
Vancomycin Consult    MD ordering: Mikel   ID following? no  Indication: Sepsis  Goal level(s): 15 - 20    Ht Readings from Last 1 Encounters:   07/15/20 6' 1\" (1.854 m)      Wt Readings from Last 1 Encounters:   20 110.2 kg (243 lb)         Temp (24hrs), Av.4 °F (37.4 °C), Min:97.7 °F (36.5 °C), Max:102.3 °F (39.1 °C)    Dosing weight: 110 kg  85 y.o. Date:  Dose/Freq Admin Times Level/Time:   20 Vanc 2500 mg IV x1 dose 02120 Vanc 1500 mg IV q18h 19 Vanc 1500 mg IV q12h 09, 21 Vanc 1500 mg IV q12h 0834  Order discontinued  Tr 2000 (canceled)             DOSING RESTARTED     20 Vanc 2 gm IV x 1 dose 1021    20 Vanc 1750 mg IV q12h 2100    20 Vanc 1750 mg IV q12h 10:43  21:20 Tr level at 21:34 was 22.3    Vanc 1250 mg IV q12h  Hold  (23) Trough at 10:12 was 23.1    Vanc 1250 mg IV q12h (11)  (23)  Trough due (22) - not ordered     Lab Results   Component Value Date/Time    Vancomycin,trough 23.1 (HH) 2020 10:12 AM         Lab Results   Component Value Date/Time    Vancomycin,trough 22.3 (HH) 2020 09:34 PM       Recent Labs     20  0334 20  0348 20  0402   BUN 31* 37* 36*   CREA 0.94 1.10 1.51*   WBC 16.7* 15.6* 16.1*   PCT <0.05  --   --      Estimated Creatinine Clearance: 74.8 mL/min (based on SCr of 0.94 mg/dL). Corrected CrCl ~ 70 ml/min      Day 3 (since resuming) Assessment and Plan:  Vancomycin trough was elevated at 22.3, however it appears to have be drawn after Vancomycin was infusing. A new level drawn today was elevated at 23.1. Will hold one dose and re-start Vancomycin IV 1250 mg every 12 hours. The next dose is due at 23:00. Will continue with lower dose for now of Vancomycin 1250 mg  Pharmacy will continue to follow.       Thank you,    Estella Diggs, PharmD, BCPS

## 2020-07-24 NOTE — PROGRESS NOTES
Care Management Interventions  PCP Verified by CM: Yes  Mode of Transport at Discharge: S  Transition of Care Consult (CM Consult): SNF  [address]: Yes  Partner SNF: Yes  Physical Therapy Consult: Yes  Occupational Therapy Consult: Yes  Current Support Network: Lives Alone  Confirm Follow Up Transport: Other (see comment)(stretcher)  The Plan for Transition of Care is Related to the Following Treatment Goals : return to independent function  The Patient and/or Patient Representative was Provided with a Choice of Provider and Agrees with the Discharge Plan?: Yes  Freedom of Choice List was Provided with Basic Dialogue that Supports the Patient's Individualized Plan of Care/Goals, Treatment Preferences and Shares the Quality Data Associated with the Providers?: Yes  Discharge Location  Discharge Placement: [address] with PT feels very strongly that patient is now more appropriate for 9th floor rehab and needs to be re-evaluated. Patient making progress now and MD states no fever for several days. Patient agreeable to rehab and has already been in contact with a person he will hire to help with his care on d/c. MEREDITH Peralta at [facility] who is is niece, is also supportive. MD today states pt is now stable to pursue rehab. He will keep him on antibiotics until rehab can be arranged. I have called [facility] with [facility] and asked that Dr. Jamarcus Malhotra re-evaluate. Last COVID test was performed this week and  resulted neg on Tues 7-21. I also have a referral into [address] [place] [address] in [place] but pt/family and Therapy prefers 9th floor rehab if possible. Will follow.

## 2020-07-24 NOTE — PROGRESS NOTES
Resting in bed. Medicated for pain and anxiety with norco and ativan per pt request. Aquacel to R knee intact with scant old drainage. Iceman to knee.

## 2020-07-24 NOTE — PROGRESS NOTES
Problem: Mobility Impaired (Adult and Pediatric)  Goal: *Acute Goals and Plan of Care (Insert Text)  STG's 1-3 MET 7/24/20 :  (1.)Mr. Nitish Mcgregor will move from supine to sit and sit to supine  in bed with MINIMAL ASSIST. Met 7/19  (2.)Mr. Nitish Mcgregor will transfer from bed to chair and chair to bed with MINIMAL ASSIST using the least restrictive device. Met 7/24  (3.)Mr. Nitish Mcgregor will ambulate with MINIMAL ASSIST for 25 feet with the least restrictive device. Met 7/24    RE-EVALUATION / GOALS RE-ASSESSED 7/24/20 :   1) Bd mobility with HOB 20 deg & rail with SBA. 2) Transfers with walker & CGA. 3) pt ambulating  ft with rolling walker & CGA.  (4)Mr. Nitish Mcgregor will increase right knee ROM to 5°-80° and perform HEP with cues and assistance. .          ________________________________________________________________________________________________      PHYSICAL THERAPY: Re-evaluation and AM 7/24/2020  INPATIENT: PT Visit Days : 1  Payor: Mahendra Form[place] OF [place]ICARE / Plan: Veronica Rog[place] MED[place]/PPO / Product Type: Managed Care Medicare /       NAME/AGE/GENDER: Ketty Headley is a 80 y.o. male   PRIMARY DIAGNOSIS: Sepsis ([address] [A41.9]  Atrial fibrillation with RVR ([facility] [I48.91] Sepsis ([address] Sepsis ([address]       ICD-10: Treatment Diagnosis:    · Generalized Muscle Weakness (M62.81)  · Other abnormalities of gait and mobility (R26.89)   Precaution/Allergies:  Patient has no known allergies. ASSESSMENT:     Mr. Nitish Mcgregor has shown remarkable progress & continues to be motivated & hard working. This pt is the type of personality that the more intensity in his rehab, the better & quicker his progress will be. This pt & family has agreed to have 24/7 care available at home ,on DC from the 9th floor, if accepted to this program.   This pt will have greater rehab potential in a 9th floor setting instead of SNF & likely a better overall outcome.   This section established at most recent assessment   PROBLEM LIST (Impairments causing functional limitations):  1. Decreased Strength  2. Decreased ADL/Functional Activities  3. Decreased Transfer Abilities  4. Decreased Ambulation Ability/Technique  5. Decreased Balance  6. Increased Pain  7. Decreased Activity Tolerance  8. Increased Fatigue  9. Decreased Flexibility/Joint Mobility  10. Edema/Girth  11. Decreased [place] with Home Exercise Program   INTERVENTIONS PLANNED: (Benefits and precautions of physical therapy have been discussed with the patient.)  1. Balance Exercise  2. Bed Mobility  3. Family Education  4. Gait Training  5. Home Exercise Program (HEP)  6. Range of Motion (ROM)  7. Therapeutic Activites  8. Therapeutic Exercise/Strengthening  9. Transfer Training     TREATMENT PLAN: Frequency/Duration: daily for duration of hospital stay  Rehabilitation Potential For Stated Goals: Good     REHAB RECOMMENDATIONS (at time of discharge pending progress):    Placement:  Rehab in pt 9th floor  Equipment:    to be determined              HISTORY:   History of Present Injury/Illness (Reason for Referral):  Mr. Mirella Cabezas is a nice 81 y/o WM sent to the ER from home on 7/15 for confusion. 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 Yesterday a friend thought he was confused and dizzy and sent him to the ER. He was in rapid AFib and met sepsis criteria. CXR negative. Cultures collected. He was started on antibiotics and a Cardizem drip and admitted. Ortho consulted. 7/17: O2 from 2L to 4L. Afebrile overnight. WBCs up a little. He is asking if he can go home today. Overnight nurse changed knee bandage, I reviewed a picture that was taken of the surgical site and there was one circular area of erythema just distal to the knee, lateral side of incision. Had to get Cardizem bolus overnight, back in NSR this morning. Bps good.   Past Medical History/Comorbidities:   Mr. Mirella Cabezas  has a past medical history of Acute lumbar radiculopathy, Arthritis, Ascending aorta dilatation [facility]), Atrial fibrillation with RVR ([facility] Enlarged prostate, Martinez catheter in place, GERD (gastroesophageal reflux disease), High cholesterol, Hypertension, ICH (intracerebral hemorrhage) ([facility] Lumbar stenosis with neurogenic claudication, Other forms of scoliosis, lumbar region, Poor historian, Psychiatric disorder, Seizures ([facility] Tongue lesion, and Urinary frequency. Mr. aLura Gonzalez  has a past surgical history that includes hx other surgical; hx turp (10/20/11); hx heent (8/2012); hx orthopaedic (Right); and hx other surgical.  Social History/Living Environment:   Home Environment: Private residence  # Steps to Enter: 1(4 ft ramp for dog)  One/Two Story Residence: One story  Living Alone: Yes  Support Systems: Family member(s)  Patient Expects to be Discharged to[de-identified] Skilled nursing facility  Current DME Used/Available at Home: Elias Peek, rollator  Prior Level of Function/Work/Activity:  Using RW after tka     Number of Personal Factors/Comorbidities that affect the Plan of Care: 3+: HIGH COMPLEXITY   EXAMINATION:   Most Recent Physical Functioning:   Gross Assessment:          RLE PROM  R Knee Flexion: 100  R Knee Extension: -15            Balance:  Sitting: Intact; Without support  Standing: Impaired; With support Bed Mobility:  Supine to Sit: Stand-by assistance  Sit to Supine: (NT)  Scooting: Contact guard assistance       Transfers:  Sit to Stand: Minimum assistance  Stand to Sit: Minimum assistance  Bed to Chair: Minimum assistance(with walker)  Duration: 38 Minutes(extra time to work through all activity noted)  Gait:  Right Side Weight Bearing: As tolerated  Speed/Elena: Delayed  Step Length: Left shortened;Right shortened  Stance: Right decreased  Gait Abnormalities: Antalgic;Decreased step clearance  Distance (ft): 50 Feet (ft)  Assistive Device: Walker, rolling  Ambulation - Level of Assistance: Minimal assistance  Interventions: Safety awareness training;Verbal cues      Body Structures Involved:  1. Bones  2. Joints  3. Muscles  4. Ligaments Body Functions Affected:  1. Movement Related Activities and Participation Affected:  1. Mobility   Number of elements that affect the Plan of Care: 3: MODERATE COMPLEXITY   CLINICAL PRESENTATION:   Presentation: Stable and uncomplicated: LOW COMPLEXITY   CLINICAL DECISION MAKIN[address] [place] [id number] AM-PAC 6 Clicks   Basic Mobility Inpatient Short Form  How much difficulty does the patient currently have. .. Unable A Lot A Little None   1. Turning over in bed (including adjusting bedclothes, sheets and blankets)? [] 1   [x] 2   [] 3   [] 4   2. Sitting down on and standing up from a chair with arms ( e.g., wheelchair, bedside commode, etc.)   [] 1   [x] 2   [] 3   [] 4   3. Moving from lying on back to sitting on the side of the bed? [] 1   [x] 2   [] 3   [] 4   How much help from another person does the patient currently need. .. Total A Lot A Little None   4. Moving to and from a bed to a chair (including a wheelchair)? [] 1   [x] 2   [] 3   [] 4   5. Need to walk in hospital room? [x] 1   [] 2   [] 3   [] 4   6. Climbing 3-5 steps with a railing? [x] 1   [] 2   [] 3   [] 4   © , Trustees of [address] [place] [address], under license to [employer]. All rights reserved      Score:  Initial: 10 Most Recent: X (Date: -- )    Interpretation of Tool:  Represents activities that are increasingly more difficult (i.e. Bed mobility, Transfers, Gait). Medical Necessity:     · Patient is expected to demonstrate progress in   · strength, range of motion, and balance  ·  to   · decrease assistance required with exercises and functional mobility  · . Reason for Services/Other Comments:  · Patient   · continues to require present interventions due to patient's inability to perform exercises and functional mobility independently  · .    Use of outcome tool(s) and clinical judgement create a POC that gives a: Questionable prediction of patient's progress: MODERATE COMPLEXITY            TREATMENT:   (In addition to Assessment/Re-Assessment sessions the following treatments were rendered)   Pre-treatment Symptoms/Complaints:  \" I want to work to get back home \"  Pain: Initial:  Numeric scale  Pain Intensity 1: 3  Pain Location 1: Knee  Pain Orientation 1: Right  Pain Intervention(s) 1: Ambulation/Increased Activity, Cold pack  Post Session: 3/10     Therapeutic Activity: (  38 Minutes(extra time to work through all activity noted) : Therapeutic activities including bed mobility. Transfers, progressive gait, TKA exercises to improve mobility, strength and balance. Required minimal Safety awareness training;Verbal cues to promote static balance in standing. Date:  7/21 Date:  7/23 Date:   7/24   ACTIVITY/EXERCISE AM PM AM PM AM PM   GROUP THERAPY  []  []  []  []  []  []   Ankle Pumps 20   20 20    Quad Sets 20   20 20    Gluteal Sets 20   20 20    Hip ABd/ADduction 20aa   20 20    Straight Leg Raises 20aa   20 20aa    Knee Slides 20aa   20 20    Short Arc Quads 20    20    Long Arc Quads         Chair Slides     20               B = bilateral; AA = active assistive; A = active; P = passive     Braces/Orthotics/Lines/Etc:   · IV  · arnett catheter  · O2 Device: Nasal [id number]  Treatment/Session Assessment:    · Response to Treatment:  Significant gains on re-eval today  · Interdisciplinary Collaboration:   o Registered Nurse  o   · After treatment position/precautions:   o Up in chair  o Bed/Chair-wheels locked  o Call light within reach  o RN notified   · Compliance with Program/Exercises: Will assess as treatment progresses  · Recommendations/Intent for next treatment session: \"Next visit will focus on advancements to more challenging activities and reduction in assistance provided\".   Total Treatment Duration:  PT Patient Time In/Time Out  Time In: 1006  Time Out: [address] Sam[address] Andrews, PT

## 2020-07-25 LAB
ANION GAP SERPL CALC-SCNC: 4 MMOL/L (ref 7–16)
BASOPHILS # BLD: 0.1 K/UL (ref 0–0.2)
BASOPHILS NFR BLD: 1 % (ref 0–2)
BUN SERPL-MCNC: 23 MG/DL (ref 8–23)
CALCIUM SERPL-MCNC: 8.3 MG/DL (ref 8.3–10.4)
CHLORIDE SERPL-SCNC: 105 MMOL/L (ref 98–107)
CO2 SERPL-SCNC: 29 MMOL/L (ref 21–32)
CREAT SERPL-MCNC: 0.99 MG/DL (ref 0.8–1.5)
DIFFERENTIAL METHOD BLD: ABNORMAL
EOSINOPHIL # BLD: 0.4 K/UL (ref 0–0.8)
EOSINOPHIL NFR BLD: 3 % (ref 0.5–7.8)
ERYTHROCYTE [DISTWIDTH] IN BLOOD BY AUTOMATED COUNT: 13.4 % (ref 11.9–14.6)
GLUCOSE SERPL-MCNC: 102 MG/DL (ref 65–100)
HCT VFR BLD AUTO: 28.8 % (ref 41.1–50.3)
HGB BLD-MCNC: 8.8 G/DL (ref 13.6–17.2)
IMM GRANULOCYTES # BLD AUTO: 0.2 K/UL (ref 0–0.5)
IMM GRANULOCYTES NFR BLD AUTO: 1 % (ref 0–5)
LYMPHOCYTES # BLD: 1.7 K/UL (ref 0.5–4.6)
LYMPHOCYTES NFR BLD: 12 % (ref 13–44)
MCH RBC QN AUTO: 30.7 PG (ref 26.1–32.9)
MCHC RBC AUTO-ENTMCNC: 30.6 G/DL (ref 31.4–35)
MCV RBC AUTO: 100.3 FL (ref 79.6–97.8)
MONOCYTES # BLD: 1.3 K/UL (ref 0.1–1.3)
MONOCYTES NFR BLD: 9 % (ref 4–12)
NEUTS SEG # BLD: 11.1 K/UL (ref 1.7–8.2)
NEUTS SEG NFR BLD: 75 % (ref 43–78)
NRBC # BLD: 0 K/UL (ref 0–0.2)
PLATELET # BLD AUTO: 368 K/UL (ref 150–450)
PMV BLD AUTO: 8.7 FL (ref 9.4–12.3)
POTASSIUM SERPL-SCNC: 3.9 MMOL/L (ref 3.5–5.1)
RBC # BLD AUTO: 2.87 M/UL (ref 4.23–5.6)
SODIUM SERPL-SCNC: 138 MMOL/L (ref 136–145)
WBC # BLD AUTO: 14.8 K/UL (ref 4.3–11.1)

## 2020-07-25 PROCEDURE — 3331090002 HH PPS REVENUE DEBIT

## 2020-07-25 PROCEDURE — 80048 BASIC METABOLIC PNL TOTAL CA: CPT

## 2020-07-25 PROCEDURE — 36415 COLL VENOUS BLD VENIPUNCTURE: CPT

## 2020-07-25 PROCEDURE — 74011250637 HC RX REV CODE- 250/637: Performed by: NURSE PRACTITIONER

## 2020-07-25 PROCEDURE — 74011250637 HC RX REV CODE- 250/637: Performed by: INTERNAL MEDICINE

## 2020-07-25 PROCEDURE — 85025 COMPLETE CBC W/AUTO DIFF WBC: CPT

## 2020-07-25 PROCEDURE — 94762 N-INVAS EAR/PLS OXIMTRY CONT: CPT

## 2020-07-25 PROCEDURE — 74011000258 HC RX REV CODE- 258: Performed by: INTERNAL MEDICINE

## 2020-07-25 PROCEDURE — 97530 THERAPEUTIC ACTIVITIES: CPT

## 2020-07-25 PROCEDURE — 3331090001 HH PPS REVENUE CREDIT

## 2020-07-25 PROCEDURE — 74011250636 HC RX REV CODE- 250/636: Performed by: INTERNAL MEDICINE

## 2020-07-25 PROCEDURE — 65270000029 HC RM PRIVATE

## 2020-07-25 RX ORDER — BISACODYL 5 MG
5 TABLET, DELAYED RELEASE (ENTERIC COATED) ORAL DAILY
Status: DISCONTINUED | OUTPATIENT
Start: 2020-07-25 | End: 2020-07-29 | Stop reason: HOSPADM

## 2020-07-25 RX ADMIN — OXYCODONE 5 MG: 5 TABLET ORAL at 09:57

## 2020-07-25 RX ADMIN — Medication 10 ML: at 14:00

## 2020-07-25 RX ADMIN — ASPIRIN 81 MG: 81 TABLET, COATED ORAL at 09:57

## 2020-07-25 RX ADMIN — PIPERACILLIN AND TAZOBACTAM 3.38 G: 3; .375 INJECTION, POWDER, LYOPHILIZED, FOR SOLUTION INTRAVENOUS at 15:39

## 2020-07-25 RX ADMIN — PANTOPRAZOLE SODIUM 40 MG: 40 TABLET, DELAYED RELEASE ORAL at 04:51

## 2020-07-25 RX ADMIN — METOPROLOL SUCCINATE 100 MG: 50 TABLET, EXTENDED RELEASE ORAL at 21:00

## 2020-07-25 RX ADMIN — LORAZEPAM 0.5 MG: 0.5 TABLET ORAL at 03:39

## 2020-07-25 RX ADMIN — PIPERACILLIN AND TAZOBACTAM 3.38 G: 3; .375 INJECTION, POWDER, LYOPHILIZED, FOR SOLUTION INTRAVENOUS at 23:44

## 2020-07-25 RX ADMIN — PIPERACILLIN AND TAZOBACTAM 3.38 G: 3; .375 INJECTION, POWDER, LYOPHILIZED, FOR SOLUTION INTRAVENOUS at 00:28

## 2020-07-25 RX ADMIN — BISACODYL 5 MG: 5 TABLET, COATED ORAL at 12:13

## 2020-07-25 RX ADMIN — PIPERACILLIN AND TAZOBACTAM 3.38 G: 3; .375 INJECTION, POWDER, LYOPHILIZED, FOR SOLUTION INTRAVENOUS at 09:57

## 2020-07-25 RX ADMIN — HYDROCODONE BITARTRATE AND ACETAMINOPHEN 1 TABLET: 5; 325 TABLET ORAL at 03:39

## 2020-07-25 RX ADMIN — ASPIRIN 81 MG: 81 TABLET, COATED ORAL at 18:44

## 2020-07-25 RX ADMIN — HYDROCODONE BITARTRATE AND ACETAMINOPHEN 1 TABLET: 5; 325 TABLET ORAL at 15:39

## 2020-07-25 RX ADMIN — LORAZEPAM 0.5 MG: 0.5 TABLET ORAL at 15:39

## 2020-07-25 RX ADMIN — OXYCODONE 5 MG: 5 TABLET ORAL at 23:53

## 2020-07-25 RX ADMIN — LORAZEPAM 0.5 MG: 0.5 TABLET ORAL at 21:00

## 2020-07-25 RX ADMIN — CITALOPRAM HYDROBROMIDE 20 MG: 20 TABLET ORAL at 09:57

## 2020-07-25 RX ADMIN — Medication 5 ML: at 21:02

## 2020-07-25 RX ADMIN — LORAZEPAM 0.5 MG: 0.5 TABLET ORAL at 09:57

## 2020-07-25 RX ADMIN — ACETAMINOPHEN 500 MG: 500 TABLET, FILM COATED ORAL at 09:57

## 2020-07-25 NOTE — PROGRESS NOTES
Medicated with Norco 1 tablet PO as requested for pain and ativan 0.5 mg PO as requested for anxiety, see MAR.

## 2020-07-25 NOTE — PROGRESS NOTES
Name: Neil Bajwa MRN: [id number]  : 1934  Age:85 y.o.  male  Admit Date:  7/15/2020 LOS: 9      Hospitalist Progress Note    Neil Bajwa is a 80 y.o. male with medical history significant for major depressive disorder/anxiety, neurogenic bladder with suprapubic catheter, hypertension, GERD who was admitted from home on 7/15 for confusion. Patient recently underwent right total knee arthroplasty on . Patient was found to be confused/dizzy by a friend on  then sent him to the ED. Patient was found to have A. fib RVR on arrival and met sepsis criteria. CXR is negative. She was recollected and patient was started on IV antibiotics. Ortho was consulted. Right TKA incision site appears to be healing well therefore it is unlikely the source of sepsis. There was noted to have some purulence around suprapubic catheter but culture has been negative to date. Patient appeared to develop mild volume overload and started on IV Lasix. He noted to have EMILI with elevation of creatinine on . He had episodes of fever spikes with T-max of 102.3  while on p.o. Keflex/doxycycline. Antibiotics was broadened to IV vancomycin and Zosyn and cultures were repeated. The blood cultures and urine cultures have been negative to date. Chest x-ray did not reveal any acute infiltrate. Subjective (20) :  Patient is seen and examined at bedside. Afebrile for over 48 hours. Events overnight  He is now on room air and saturating at > 93%. No acute complaints at this time. Patient is eager to go to 9th floor. Patient denies chills, chest pains, shortness of breath, n/v, abdominal pain. ROS: 10 point review of systems is otherwise negative with the exception of the elements mentioned above.     Objective:    Patient Vitals for the past 24 hrs:   Temp Pulse Resp BP SpO2   20 0726 98.3 °F (36.8 °C) 79 16 131/71 93 %   20 0350 98.5 °F (36.9 °C) 73 16 128/73 94 %   20 0026 97.8 °F (36.6 °C) 76 16 134/64 93 %   07/24/20 2011 98.4 °F (36.9 °C) 93 16 (!) 147/92 96 %   07/24/20 1544 98.4 °F (36.9 °C) 71 16 137/72 98 %   07/24/20 1155 98.1 °F (36.7 °C) 73 16 104/62 97 %     Oxygen Therapy  O2 Sat (%): 93 % (07/25/20 0726)  Pulse via Oximetry: 102 beats per minute (07/23/20 0719)  O2 Device: Room air (07/24/20 0750)  O2 Flow Rate (L/min): 2 l/min (07/23/20 0719)  FIO2 (%): 28 % (07/23/20 0719)    Estimated body mass index is 31.93 kg/m² as calculated from the following:    Height as of this encounter: 6' 1\" (1.854 m). Weight as of this encounter: 109.8 kg (242 lb). Intake/Output Summary (Last 24 hours) at 7/25/2020 0857  Last data filed at 7/25/2020 0405  Gross per 24 hour   Intake    Output 1700 ml   Net -1700 ml       *Note that automatically entered I/Os may not be accurate; dependent on patient compliance with collection and accurate  by techs. Physical Exam:   General:     alert, awake, no acute distress. Well nourished. Obese  Head:   normocephalic, atraumatic  Eyes, Ears, nose: PERRL, EOMI. Normal conjunctiva  Neck:    supple, non-tender. Trachea midline. Lungs:   Bibasilar crackles, no wheezing  Cardiac:   RRR, Normal S1 and S2. Abdomen:   Soft, non distended, nontender, +BS,      (+) suprapubic catheter: site appears clean/dry without surrounding erythema   Extremities:   Warm, dry. B/l +1 pitting edema (R>L) with stasis dermatitis skin changes. Cooling brace to right knee. Skin:   No rashes, no jaundice  Neuro:  AAOx3.  No gross focal neurological deficit  Psychiatric:  No anxiety, calm, cooperative    Data Review:  I have reviewed all labs, meds, and studies from the last 24 hours:    Recent Results (from the past 24 hour(s))   VANCOMYCIN, TROUGH    Collection Time: 07/24/20 10:12 AM   Result Value Ref Range    Vancomycin,trough 23.1 (HH) 5 - 20 ug/mL   CBC WITH AUTOMATED DIFF    Collection Time: 07/25/20  3:38 AM   Result Value Ref Range    WBC 14.8 (H) 4.3 - 11.1 K/uL    RBC 2.87 (L) 4.23 - 5.6 M/uL    HGB 8.8 (L) 13.6 - 17.2 g/dL    HCT 28.8 (L) 41.1 - 50.3 %    .3 (H) 79.6 - 97.8 FL    MCH 30.7 26.1 - 32.9 PG    MCHC 30.6 (L) 31.4 - 35.0 g/dL    RDW 13.4 11.9 - 14.6 %    PLATELET 053 685 - 200 K/uL    MPV 8.7 (L) 9.4 - 12.3 FL    ABSOLUTE NRBC 0.00 0.0 - 0.2 K/uL    DF AUTOMATED      NEUTROPHILS 75 43 - 78 %    LYMPHOCYTES 12 (L) 13 - 44 %    MONOCYTES 9 4.0 - 12.0 %    EOSINOPHILS 3 0.5 - 7.8 %    BASOPHILS 1 0.0 - 2.0 %    IMMATURE GRANULOCYTES 1 0.0 - 5.0 %    ABS. NEUTROPHILS 11.1 (H) 1.7 - 8.2 K/UL    ABS. LYMPHOCYTES 1.7 0.5 - 4.6 K/UL    ABS. MONOCYTES 1.3 0.1 - 1.3 K/UL    ABS. EOSINOPHILS 0.4 0.0 - 0.8 K/UL    ABS. BASOPHILS 0.1 0.0 - 0.2 K/UL    ABS. IMM.  GRANS. 0.2 0.0 - 0.5 K/UL   METABOLIC PANEL, BASIC    Collection Time: 07/25/20  3:38 AM   Result Value Ref Range    Sodium 138 136 - 145 mmol/L    Potassium 3.9 3.5 - 5.1 mmol/L    Chloride 105 98 - 107 mmol/L    CO2 29 21 - 32 mmol/L    Anion gap 4 (L) 7 - 16 mmol/L    Glucose 102 (H) 65 - 100 mg/dL    BUN 23 8 - 23 MG/DL    Creatinine 0.99 0.8 - 1.5 MG/DL    GFR est AA >60 >60 ml/min/1.73m2    GFR est non-AA >60 >60 ml/min/1.73m2    Calcium 8.3 8.3 - 10.4 MG/DL        All Micro Results     Procedure Component Value Units Date/Time    CULTURE, BLOOD [[id number]] Collected:  07/21/20 3505    Order Status:  Completed Specimen:  Blood Updated:  07/25/20 0707     Special Requests: --        RIGHT  Antecubital       Culture result: NO GROWTH 4 DAYS       CULTURE, BLOOD [[id number]] Collected:  07/21/20 1742    Order Status:  Completed Specimen:  Blood Updated:  07/25/20 0707     Special Requests: --        RIGHT  HAND       Culture result: NO GROWTH 4 DAYS       CULTURE, BLOOD [[id number]] Collected:  07/15/20 2249    Order Status:  Completed Specimen:  Blood Updated:  07/20/20 0726     Special Requests: --        RIGHT  Antecubital       Culture result: NO GROWTH 5 DAYS       CULTURE, BLOOD [[id number]] Collected:  07/15/20 2334    Order Status:  Completed Specimen:  Blood Updated:  07/20/20 0726     Special Requests: --        RIGHT  HAND       Culture result: NO GROWTH 5 DAYS       CULTURE, Sigmund Ill STAIN [[id number]] Collected:  07/16/20 1213    Order Status:  Completed Specimen:  Wound from Skin Updated:  07/19/20 0730     Special Requests: SUPRAPUBIC     GRAM STAIN 1 TO 5 WBCS SEEN PER OIF      NO DEFINITE ORGANISM SEEN        Culture result: NO GROWTH 2 DAYS       CULTURE, URINE [[id number]] Collected:  07/15/20 2321    Order Status:  Completed Specimen:  Urine Updated:  07/18/20 0837     Special Requests: NO SPECIAL REQUESTS        Culture result:       <10,000 COLONIES/mL MIXED SKIN MOE ISOLATED                Current Meds:  Current Facility-Administered Medications   Medication Dose Route Frequency    piperacillin-tazobactam (ZOSYN) 3.375 g in 0.9% sodium chloride (MBP/ADV) 100 mL  3.375 g IntraVENous Q8H    bisacodyL (DULCOLAX) suppository 10 mg  10 mg Rectal DAILY PRN    acetaminophen (TYLENOL) tablet 1,000 mg  1,000 mg Oral Q6H PRN    sodium chloride (NS) flush 5-40 mL  5-40 mL IntraVENous Q8H    sodium chloride (NS) flush 5-40 mL  5-40 mL IntraVENous PRN    HYDROcodone-acetaminophen (NORCO) 5-325 mg per tablet 1 Tab  1 Tab Oral Q4H PRN    naloxone (NARCAN) injection 0.4 mg  0.4 mg IntraVENous PRN    ondansetron (ZOFRAN) injection 4 mg  4 mg IntraVENous Q4H PRN    senna-docusate (PERICOLACE) 8.6-50 mg per tablet 2 Tab  2 Tab Oral DAILY PRN    LORazepam (ATIVAN) tablet 0.5 mg  0.5 mg Oral Q4H PRN    zolpidem (AMBIEN) tablet 5 mg  5 mg Oral QHS PRN    aspirin delayed-release tablet 81 mg  81 mg Oral BID    citalopram (CELEXA) tablet 20 mg  20 mg Oral DAILY    [Held by provider] losartan (COZAAR) tablet 25 mg  25 mg Oral QHS    metoprolol succinate (TOPROL-XL) XL tablet 100 mg  100 mg Oral QHS    pantoprazole (PROTONIX) tablet 40 mg  40 mg Oral ACB    oxyCODONE IR (ROXICODONE) tablet 5 mg  5 mg Oral Q4H PRN    polyethylene glycol (MIRALAX) packet 17 g  17 g Oral DAILY    senna-docusate (PERICOLACE) 8.6-50 mg per tablet 1 Tab  1 Tab Oral DAILY    traZODone (DESYREL) tablet 100 mg  100 mg Oral QHS PRN    metoprolol (LOPRESSOR) injection 5 mg  5 mg IntraVENous Q4H PRN         Other Studies:  Xr Chest Port    Result Date: 7/15/2020  EXAM: Chest x-ray. INDICATION: Dyspnea. COMPARISON: None. TECHNIQUE: Single frontal view. FINDINGS: The lungs are clear. The heart is enlarged. Mediastinal contour and pulmonary vasculature are within normal limits. No pneumothorax or pleural effusion is seen. IMPRESSION: 1. No acute process. 2. Cardiomegaly. Xr Knee Rt 3 V    Result Date: 7/16/2020  Portable right knee  7/16/2020 1:20 PM Indication: Patient immediately status post TKA Comparison: None available at this hospital PACS Findings: Portable AP, oblique and crosstable lateral views from 1246 are submitted. There is an anteriorly located vertically oriented skin staple line. There is soft tissues swelling and trace collections of air in the soft tissues and joints. No unexpected radiopaque foreign body. TKA prosthesis components appear in appropriate alignment with no acute complicating features. No bony fracture. Impression: Expected immediate postop TKA findings. No acute complicating features. Duplex Lower Ext Venous Right    Result Date: 7/16/2020  RIGHT LOWER EXTREMITY DEEP VENOUS SONOGRAPHY: CLINICAL HISTORY: Leg pain and swelling after recent right knee surgery. FINDINGS: Multiple images from real time ultrasound evaluation of the deep venous system of the right leg demonstrate normal venous flow in the posterior tibial, popliteal, and superficial and common femoral veins. Normal compressibility was demonstrated. Flow was also documented in the proximal saphenous and deep femoral veins.  No intraluminal echogenic material was seen to suggest the presence of nonobstructive thrombus. IMPRESSION: NO ULTRASOUND EVIDENCE OF DEEP VENOUS THROMBOSIS IN THE RIGHT LEG. Assessment and Plan: Active Hospital Problems    Diagnosis Date Noted    EMILI (acute kidney injury) ([facility] 07/20/2020    Atrial fibrillation with RVR ([facility] 07/16/2020    Sepsis ([facility] 07/16/2020    Neurogenic bladder 07/16/2020    GERD (gastroesophageal reflux disease)      controlled w/med      Hypertension      Plan:    Sepsis likely from suprapubic cath site   All cultures have been neg to date. Febrile episode with Tmax of 102.3 on 7/22 overnight while on PO keflex/doxy since 7/18. Continues to have leukocytosis, febrile episode, elevated heart rate meeting SIRS criteria for sepsis of unknown etiology. Repeat UA on 7/21 is negative for UTI. Repeat CXR (7/22) without any new consolidation/infiltrates. - Repeat blood culture(7/21) neg to date  - s/p 3 days of vancomycin(7/22-7/24), doxy/keflex(7/28-7/21)  - continue with zosyn for now (7/22)    Afib RVR (resolved)  Likely due to acute illness/sepsis. Now sinus rhythm with possible PVC or aberrant complexes  - continue with metoprolol XL 100mg qHS  - no AC for now given recent procedure     MDD/Anxiety  - continue with celexa, PRN ativan. - per notes, he had suicidal ideation a few nights ago but he has denied any SI to me and during psych consult    Hypertension: continue with home medications  GERD: continue with PPI    Hypoxia likely due to underlying sepsis and volume overload(resolved)  EMILI(resolved)      Diet:  DIET REGULAR  DVT PPx: SCDs  Code: Full Code  Dispo: SNF vs 9th floor pending  Estimated Discharge:  72 hrs if remains afebrile    Labs/Imaging Reviewed. Patient is moderate risk due to current condition and comorbid conditions as well as requiring frequent monitoring. Plan discussed with staff, patient/family and are in agreement.      Time Spent: Greater than 45 minutes was spent in reviewing charts, physical exam, discussion with patient, and answered any questions.     Signed By: Magi Navarro MD     July 25, 2020

## 2020-07-25 NOTE — PROGRESS NOTES
Patient resting quietly, alert and oriented, no distress noted. Right knee ressing c/d/i, SCDs on. Suprapubic cathetar was just emptied for 600 ml/hr. Neurovascular and peripheral vascular checks WNL. Bed low and locked position. Fresh ice placed in iceman. Call light within reach. Patient instructed to call for assistance, verbalizes understanding. Nursing assessment complete.

## 2020-07-25 NOTE — PROGRESS NOTES
Problem: Mobility Impaired (Adult and Pediatric)  Goal: *Acute Goals and Plan of Care (Insert Text)  STG's 1-3 MET 7/24/20 :  (1.)Mr. Alex Gale will move from supine to sit and sit to supine  in bed with MINIMAL ASSIST. Met 7/19  (2.)Mr. Alex Gale will transfer from bed to chair and chair to bed with MINIMAL ASSIST using the least restrictive device. Met 7/24  (3.)Mr. Alex Gale will ambulate with MINIMAL ASSIST for 25 feet with the least restrictive device. Met 7/24    RE-EVALUATION / GOALS RE-ASSESSED 7/24/20 :   1) Bd mobility with HOB 20 deg & rail with SBA. 2) Transfers with walker & CGA. 3) pt ambulating  ft with rolling walker & CGA.  (4)Mr. Alex Gale will increase right knee ROM to 5°-80° and perform HEP with cues and assistance. .          ________________________________________________________________________________________________      PHYSICAL THERAPY: Daily Note and AM 7/25/2020  INPATIENT: PT Visit Days : 2  Payor: [facility] [place] MEDICARE / Plan: [facility]ena More[place] / Product Type: Managed Care Medicare /       NAME/AGE/GENDER: Gary Paredes is a 80 y.o. male   PRIMARY DIAGNOSIS: Sepsis ([facility] [A41.9]  Atrial fibrillation with RVR ([facility] [I48.91] Sepsis ([facility] Sepsis ([address]       ICD-10: Treatment Diagnosis:    · Generalized Muscle Weakness (M62.81)  · Other abnormalities of gait and mobility (R26.89)   Precaution/Allergies:  Patient has no known allergies. ASSESSMENT:     Mr. Alex Gale parrticipated well with PT, showed improved gait quality needing less assist over a longer distance. Pt performed all exercises but needed help with SLR's. Pt will need to wear a KI to prevent further extension lag on right. This section established at most recent assessment   PROBLEM LIST (Impairments causing functional limitations):  1. Decreased Strength  2. Decreased ADL/Functional Activities  3. Decreased Transfer Abilities  4. Decreased Ambulation Ability/Technique  5. Decreased Balance  6.  Increased Pain  7. Decreased Activity Tolerance  8. Increased Fatigue  9. Decreased Flexibility/Joint Mobility  10. Edema/Girth  11. Decreased Hope with Home Exercise Program   INTERVENTIONS PLANNED: (Benefits and precautions of physical therapy have been discussed with the patient.)  1. Balance Exercise  2. Bed Mobility  3. Family Education  4. Gait Training  5. Home Exercise Program (HEP)  6. Range of Motion (ROM)  7. Therapeutic Activites  8. Therapeutic Exercise/Strengthening  9. Transfer Training     TREATMENT PLAN: Frequency/Duration: daily for duration of hospital stay  Rehabilitation Potential For Stated Goals: Good     REHAB RECOMMENDATIONS (at time of discharge pending progress):    Placement:  Rehab in pt 9th floor  Equipment:    to be determined              HISTORY:   History of Present Injury/Illness (Reason for Referral):  Mr. Azam Sharif is a nice 79 y/o WM sent to the ER from home on 7/15 for confusion. 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 Yesterday a friend thought he was confused and dizzy and sent him to the ER. He was in rapid AFib and met sepsis criteria. CXR negative. Cultures collected. He was started on antibiotics and a Cardizem drip and admitted. Ortho consulted. 7/17: O2 from 2L to 4L. Afebrile overnight. WBCs up a little. He is asking if he can go home today. Overnight nurse changed knee bandage, I reviewed a picture that was taken of the surgical site and there was one circular area of erythema just distal to the knee, lateral side of incision. Had to get Cardizem bolus overnight, back in NSR this morning. Bps good.   Past Medical History/Comorbidities:   Mr. Azam Sharif  has a past medical history of Acute lumbar radiculopathy, Arthritis, Ascending aorta dilatation ([address] Atrial fibrillation with RVR ([address] Enlarged prostate, Martinez catheter in place, GERD (gastroesophageal reflux disease), High cholesterol, Hypertension, ICH (intracerebral hemorrhage) ([address] Lumbar stenosis with neurogenic claudication, Other forms of scoliosis, lumbar region, Poor historian, Psychiatric disorder, Seizures ([address] Tongue lesion, and Urinary frequency. Mr. Alex Gale  has a past surgical history that includes hx other surgical; hx turp (10/20/11); hx heent (8/2012); hx orthopaedic (Right); and hx other surgical.  Social History/Living Environment:   Home Environment: Private residence  # Steps to Enter: 1(4 ft ramp for dog)  One/Two Story Residence: One story  Living Alone: Yes  Support Systems: Family member(s)  Patient Expects to be Discharged to[de-identified] Skilled nursing facility  Current DME Used/Available at Home: karthikeyan Pedroza  Prior Level of Function/Work/Activity:  Using RW after tka     Number of Personal Factors/Comorbidities that affect the Plan of Care: 3+: HIGH COMPLEXITY   EXAMINATION:   Most Recent Physical Functioning:   Gross Assessment:          RLE PROM  R Knee Flexion: 96  R Knee Extension: -20            Balance:  Sitting: Intact; Without support  Standing: Impaired; With support(walker) Bed Mobility:  Supine to Sit: Stand-by assistance  Sit to Supine: (NT)  Scooting: Contact guard assistance       Transfers:  Sit to Stand: Minimum assistance  Stand to Sit: Minimum assistance  Bed to Chair: Minimum assistance(with walker)  Duration: 38 Minutes(extra time to work through all activity noted)  Gait:  Right Side Weight Bearing: As tolerated  Speed/Elena: Delayed  Step Length: Left shortened;Right shortened  Stance: Right decreased  Gait Abnormalities: Antalgic;Decreased step clearance  Distance (ft): 100 Feet (ft)  Assistive Device: Walker, rolling  Ambulation - Level of Assistance: Contact guard assistance(intermittent min A)  Interventions: Safety awareness training;Verbal cues      Body Structures Involved:  1. Bones  2. Joints  3. Muscles  4. Ligaments Body Functions Affected:  1. Movement Related Activities and Participation Affected:  1.  Mobility   Number of elements that affect the Plan of Care: 3: MODERATE COMPLEXITY   CLINICAL PRESENTATION:   Presentation: Stable and uncomplicated: LOW COMPLEXITY   CLINICAL DECISION MAKIN[address] [place] [id number] AM-PAC 6 Clicks   Basic Mobility Inpatient Short Form  How much difficulty does the patient currently have. .. Unable A Lot A Little None   1. Turning over in bed (including adjusting bedclothes, sheets and blankets)? [] 1   [x] 2   [] 3   [] 4   2. Sitting down on and standing up from a chair with arms ( e.g., wheelchair, bedside commode, etc.)   [] 1   [x] 2   [] 3   [] 4   3. Moving from lying on back to sitting on the side of the bed? [] 1   [x] 2   [] 3   [] 4   How much help from another person does the patient currently need. .. Total A Lot A Little None   4. Moving to and from a bed to a chair (including a wheelchair)? [] 1   [x] 2   [] 3   [] 4   5. Need to walk in hospital room? [x] 1   [] 2   [] 3   [] 4   6. Climbing 3-5 steps with a railing? [x] 1   [] 2   [] 3   [] 4   © , Trustees of [address] [place] [address] [address] [address], under license to [employer]. All rights reserved      Score:  Initial: 10 Most Recent: X (Date: -- )    Interpretation of Tool:  Represents activities that are increasingly more difficult (i.e. Bed mobility, Transfers, Gait). Medical Necessity:     · Patient is expected to demonstrate progress in   · strength, range of motion, and balance  ·  to   · decrease assistance required with exercises and functional mobility  · . Reason for Services/Other Comments:  · Patient   · continues to require present interventions due to patient's inability to perform exercises and functional mobility independently  · .    Use of outcome tool(s) and clinical judgement create a POC that gives a: Questionable prediction of patient's progress: MODERATE COMPLEXITY            TREATMENT:   (In addition to Assessment/Re-Assessment sessions the following treatments were rendered)   Pre-treatment Symptoms/Complaints:  Pt reports that pain meds make him confused  Pain: Initial:  Numeric scale  Pain Intensity 1: 3  Pain Location 1: Knee  Pain Orientation 1: Right  Pain Intervention(s) 1: Ambulation/Increased Activity, Cold pack  Post Session: 3/10     Therapeutic Activity: (  38 Minutes(extra time to work through all activity noted) : Therapeutic activities including TKA exercises as noted below, bed mobility, transfers, progressive gait with rolling walker to improve mobility, strength and balance. Required minimal Safety awareness training;Verbal cues to promote static balance in standing. Date:  7/25 Date:   Date:     Activity/Exercise Parameters Parameters Parameters   Quad / gluteal sets 20     Ankle pumps 20     Heel slides 20     Hip ABD-ADD 20     SAQ's 20     SLR's 20aa     Chair slides 20            Date:  7/21 Date:  7/23 Date:   7/24   ACTIVITY/EXERCISE AM PM AM PM AM PM   GROUP THERAPY  []  []  []  []  []  []   Ankle Pumps 20   20 20    Quad Sets 20   20 20    Gluteal Sets 20   20 20    Hip ABd/ADduction 20aa   20 20    Straight Leg Raises 20aa   20 20aa    Knee Slides 20aa   20 20    Short Arc Quads 20    20    Long Arc Quads         Chair Slides     20               B = bilateral; AA = active assistive; A = active; P = passive     Braces/Orthotics/Lines/Etc:   · IV  · arnett catheter  · O2 Device: Nasal cannula  Treatment/Session Assessment:    · Response to Treatment:  Pt continues to make steady gains  · Interdisciplinary Collaboration:   o Registered Nurse  · After treatment position/precautions:   o Up in chair  o Bed/Chair-wheels locked  o Call light within reach  o RN notified   · Compliance with Program/Exercises: Will assess as treatment progresses  · Recommendations/Intent for next treatment session: \"Next visit will focus on advancements to more challenging activities and reduction in assistance provided\".   Total Treatment Duration:  PT Patient Time In/Time Out  Time In: 0194  Time Out: [address] [facility], PT

## 2020-07-26 LAB
ANION GAP SERPL CALC-SCNC: 3 MMOL/L (ref 7–16)
BACTERIA SPEC CULT: NORMAL
BACTERIA SPEC CULT: NORMAL
BASOPHILS # BLD: 0.1 K/UL (ref 0–0.2)
BASOPHILS NFR BLD: 1 % (ref 0–2)
BUN SERPL-MCNC: 20 MG/DL (ref 8–23)
CALCIUM SERPL-MCNC: 9.2 MG/DL (ref 8.3–10.4)
CHLORIDE SERPL-SCNC: 104 MMOL/L (ref 98–107)
CO2 SERPL-SCNC: 30 MMOL/L (ref 21–32)
CREAT SERPL-MCNC: 0.97 MG/DL (ref 0.8–1.5)
DIFFERENTIAL METHOD BLD: ABNORMAL
EOSINOPHIL # BLD: 0.4 K/UL (ref 0–0.8)
EOSINOPHIL NFR BLD: 3 % (ref 0.5–7.8)
ERYTHROCYTE [DISTWIDTH] IN BLOOD BY AUTOMATED COUNT: 13.5 % (ref 11.9–14.6)
GLUCOSE SERPL-MCNC: 109 MG/DL (ref 65–100)
HCT VFR BLD AUTO: 29.8 % (ref 41.1–50.3)
HGB BLD-MCNC: 9.4 G/DL (ref 13.6–17.2)
IMM GRANULOCYTES # BLD AUTO: 0.2 K/UL (ref 0–0.5)
IMM GRANULOCYTES NFR BLD AUTO: 1 % (ref 0–5)
LYMPHOCYTES # BLD: 1.5 K/UL (ref 0.5–4.6)
LYMPHOCYTES NFR BLD: 11 % (ref 13–44)
MCH RBC QN AUTO: 30.4 PG (ref 26.1–32.9)
MCHC RBC AUTO-ENTMCNC: 31.5 G/DL (ref 31.4–35)
MCV RBC AUTO: 96.4 FL (ref 79.6–97.8)
MONOCYTES # BLD: 1.2 K/UL (ref 0.1–1.3)
MONOCYTES NFR BLD: 8 % (ref 4–12)
NEUTS SEG # BLD: 11.1 K/UL (ref 1.7–8.2)
NEUTS SEG NFR BLD: 77 % (ref 43–78)
NRBC # BLD: 0 K/UL (ref 0–0.2)
PLATELET # BLD AUTO: 424 K/UL (ref 150–450)
PMV BLD AUTO: 8.5 FL (ref 9.4–12.3)
POTASSIUM SERPL-SCNC: 4 MMOL/L (ref 3.5–5.1)
RBC # BLD AUTO: 3.09 M/UL (ref 4.23–5.6)
SERVICE CMNT-IMP: NORMAL
SERVICE CMNT-IMP: NORMAL
SODIUM SERPL-SCNC: 137 MMOL/L (ref 136–145)
WBC # BLD AUTO: 14.5 K/UL (ref 4.3–11.1)

## 2020-07-26 PROCEDURE — 36415 COLL VENOUS BLD VENIPUNCTURE: CPT

## 2020-07-26 PROCEDURE — 3331090001 HH PPS REVENUE CREDIT

## 2020-07-26 PROCEDURE — 74011250636 HC RX REV CODE- 250/636: Performed by: INTERNAL MEDICINE

## 2020-07-26 PROCEDURE — 65270000029 HC RM PRIVATE

## 2020-07-26 PROCEDURE — 97530 THERAPEUTIC ACTIVITIES: CPT

## 2020-07-26 PROCEDURE — 74011250637 HC RX REV CODE- 250/637: Performed by: INTERNAL MEDICINE

## 2020-07-26 PROCEDURE — 74011000258 HC RX REV CODE- 258: Performed by: INTERNAL MEDICINE

## 2020-07-26 PROCEDURE — 85025 COMPLETE CBC W/AUTO DIFF WBC: CPT

## 2020-07-26 PROCEDURE — 80048 BASIC METABOLIC PNL TOTAL CA: CPT

## 2020-07-26 PROCEDURE — 74011250637 HC RX REV CODE- 250/637: Performed by: NURSE PRACTITIONER

## 2020-07-26 PROCEDURE — 3331090002 HH PPS REVENUE DEBIT

## 2020-07-26 RX ADMIN — Medication 5 ML: at 04:58

## 2020-07-26 RX ADMIN — ASPIRIN 81 MG: 81 TABLET, COATED ORAL at 16:49

## 2020-07-26 RX ADMIN — ACETAMINOPHEN 1000 MG: 500 TABLET, FILM COATED ORAL at 08:50

## 2020-07-26 RX ADMIN — TRAZODONE HYDROCHLORIDE 100 MG: 50 TABLET ORAL at 21:31

## 2020-07-26 RX ADMIN — PANTOPRAZOLE SODIUM 40 MG: 40 TABLET, DELAYED RELEASE ORAL at 04:57

## 2020-07-26 RX ADMIN — PIPERACILLIN AND TAZOBACTAM 3.38 G: 3; .375 INJECTION, POWDER, LYOPHILIZED, FOR SOLUTION INTRAVENOUS at 08:47

## 2020-07-26 RX ADMIN — PIPERACILLIN AND TAZOBACTAM 3.38 G: 3; .375 INJECTION, POWDER, LYOPHILIZED, FOR SOLUTION INTRAVENOUS at 16:49

## 2020-07-26 RX ADMIN — ONDANSETRON 4 MG: 2 INJECTION INTRAMUSCULAR; INTRAVENOUS at 04:57

## 2020-07-26 RX ADMIN — DOCUSATE SODIUM 50MG AND SENNOSIDES 8.6MG 1 TABLET: 8.6; 5 TABLET, FILM COATED ORAL at 09:00

## 2020-07-26 RX ADMIN — OXYCODONE 5 MG: 5 TABLET ORAL at 04:57

## 2020-07-26 RX ADMIN — LORAZEPAM 0.5 MG: 0.5 TABLET ORAL at 08:50

## 2020-07-26 RX ADMIN — ASPIRIN 81 MG: 81 TABLET, COATED ORAL at 08:46

## 2020-07-26 RX ADMIN — BISACODYL 5 MG: 5 TABLET, COATED ORAL at 08:45

## 2020-07-26 RX ADMIN — METOPROLOL SUCCINATE 100 MG: 50 TABLET, EXTENDED RELEASE ORAL at 21:31

## 2020-07-26 RX ADMIN — PIPERACILLIN AND TAZOBACTAM 3.38 G: 3; .375 INJECTION, POWDER, LYOPHILIZED, FOR SOLUTION INTRAVENOUS at 23:51

## 2020-07-26 RX ADMIN — LORAZEPAM 0.5 MG: 0.5 TABLET ORAL at 04:57

## 2020-07-26 RX ADMIN — POLYETHYLENE GLYCOL (3350) 17 G: 17 POWDER, FOR SOLUTION ORAL at 08:46

## 2020-07-26 RX ADMIN — LORAZEPAM 0.5 MG: 0.5 TABLET ORAL at 20:45

## 2020-07-26 RX ADMIN — CITALOPRAM HYDROBROMIDE 20 MG: 20 TABLET ORAL at 08:45

## 2020-07-26 RX ADMIN — LORAZEPAM 0.5 MG: 0.5 TABLET ORAL at 14:13

## 2020-07-26 NOTE — PROGRESS NOTES
Patient sitting up in recliner states that \"no one will listen to me and get me a psychiatrist to review the medications I take for these terrible nightmares and shakes I have\". Patient asking that I please put a note the chart for someone to talk with a psychiatrist for him.

## 2020-07-26 NOTE — PROGRESS NOTES
Patient resting quietly, alert and oriented, no distress noted. Right knee dressing c/d/i, suprapubic cathetar draining clear, diego   urine. Neurovascular and peripheral vascular checks WNL. Bed low and locked position. Fresh ice placed in iceman. Call light within reach. Patient instructed to call for assistance, verbalizes understanding. Nursing assessment complete.

## 2020-07-26 NOTE — PROGRESS NOTES
Problem: Mobility Impaired (Adult and Pediatric)  Goal: *Acute Goals and Plan of Care (Insert Text)  STG's 1-3 MET 7/24/20 :  (1.)Mr. Kacy Calero will move from supine to sit and sit to supine  in bed with MINIMAL ASSIST. Met 7/19  (2.)Mr. Kacy Calero will transfer from bed to chair and chair to bed with MINIMAL ASSIST using the least restrictive device. Met 7/24  (3.)Mr. Kacy Calero will ambulate with MINIMAL ASSIST for 25 feet with the least restrictive device. Met 7/24    RE-EVALUATION / GOALS RE-ASSESSED 7/24/20 :   1) Bd mobility with HOB 20 deg & rail with SBA. 2) Transfers with walker & CGA. 3) pt ambulating  ft with rolling walker & CGA.  (4)Mr. Kacy Calero will increase right knee ROM to 5°-80° and perform HEP with cues and assistance. .          ________________________________________________________________________________________________      PHYSICAL THERAPY: Daily Note and AM 7/26/2020  INPATIENT: PT Visit Days : 3  [place]or[place] Deepthi Kapoor [place] / [place] Adal Do[place] / Product Type: Managed Care Medicare /       NAME/AGE/GENDER: Sarah Castrejon is a 80 y.o. male   PRIMARY DIAGNOSIS: Sepsis ([facility] [A41.9]  Atrial fibrillation with RVR ([facility] [address] [I48.91] Sepsis ([facility] Sepsis ([facility] [facility]       ICD-10: Treatment Diagnosis:    · Generalized Muscle Weakness (M62.81)  · Other abnormalities of gait and mobility (R26.89)   Precaution/Allergies:  Patient has no known allergies. ASSESSMENT:     Mr. Kacy Calero participated well with PT today, but seemed to fatigue more quickly with standing/walking. Patient needed little assist with LE exercises. Sit>stand with min-mod A. Ambulated 60 feet with RW, slow, and fatiguing easily. Patient is making steady progress toward goals. This section established at most recent assessment   PROBLEM LIST (Impairments causing functional limitations):  1. Decreased Strength  2. Decreased ADL/Functional Activities  3. Decreased Transfer Abilities  4.  Decreased Ambulation Ability/Technique  5. Decreased Balance  6. Increased Pain  7. Decreased Activity Tolerance  8. Increased Fatigue  9. Decreased Flexibility/Joint Mobility  10. Edema/Girth  11. Decreased [place] with Home Exercise Program   INTERVENTIONS PLANNED: (Benefits and precautions of physical therapy have been discussed with the patient.)  1. Balance Exercise  2. Bed Mobility  3. Family Education  4. Gait Training  5. Home Exercise Program (HEP)  6. Range of Motion (ROM)  7. Therapeutic Activites  8. Therapeutic Exercise/Strengthening  9. Transfer Training     TREATMENT PLAN: Frequency/Duration: daily for duration of hospital stay  Rehabilitation Potential For Stated Goals: Good     REHAB RECOMMENDATIONS (at time of discharge pending progress):    Placement:  Rehab in pt 9th floor  Equipment:    to be determined              HISTORY:   History of Present Injury/Illness (Reason for Referral):  Mr. Tracee Jensen is a nice 79 y/o WM sent to the ER from home on 7/15 for confusion. 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 Yesterday a friend thought he was confused and dizzy and sent him to the ER. He was in rapid AFib and met sepsis criteria. CXR negative. Cultures collected. He was started on antibiotics and a Cardizem drip and admitted. Ortho consulted. 7/17: O2 from 2L to 4L. Afebrile overnight. WBCs up a little. He is asking if he can go home today. Overnight nurse changed knee bandage, I reviewed a picture that was taken of the surgical site and there was one circular area of erythema just distal to the knee, lateral side of incision. Had to get Cardizem bolus overnight, back in NSR this morning. Bps good.   Past Medical History/Comorbidities:   Mr. Tracee Jensen  has a past medical history of Acute lumbar radiculopathy, Arthritis, Ascending aorta dilatation ([address] Atrial fibrillation with RVR ([address] Enlarged prostate, Martinez catheter in place, GERD (gastroesophageal reflux disease), High cholesterol, Hypertension, ICH (intracerebral hemorrhage) ([facility] [address] Lumbar stenosis with neurogenic claudication, Other forms of scoliosis, lumbar region, Poor historian, Psychiatric disorder, Seizures ([facility] Tongue lesion, and Urinary frequency. Mr. George Collier  has a past surgical history that includes hx other surgical; hx turp (10/20/11); hx heent (8/2012); hx orthopaedic (Right); and hx other surgical.  Social History/Living Environment:   Home Environment: Private residence  # Steps to Enter: 1(4 ft ramp for dog)  One/Two Story Residence: One story  Living Alone: Yes  Support Systems: Family member(s)  Patient Expects to be Discharged to[de-identified] Skilled nursing facility  Current DME Used/Available at Home: karthikeyan Mao  Prior Level of Function/Work/Activity:  Using RW after tka     Number of Personal Factors/Comorbidities that affect the Plan of Care: 3+: HIGH COMPLEXITY   EXAMINATION:   Most Recent Physical Functioning:   Gross Assessment:  AROM: Generally decreased, functional  Strength: Generally decreased, functional                 Posture Assessment: Rounded shoulders  Balance:  Sitting: Intact; Without support  Standing: Impaired; With support  Standing - Static: Fair  Standing - Dynamic : Fair Bed Mobility:          Transfers:  Sit to Stand: Minimum assistance; Moderate assistance  Stand to Sit: Minimum assistance  Bed to Chair: Minimum assistance(with RW)  Duration: 25 Minutes  Gait:  Right Side Weight Bearing: As tolerated  Speed/Elena: Delayed  Step Length: Left shortened;Right shortened  Gait Abnormalities: Antalgic;Decreased step clearance  Distance (ft): 60 Feet (ft)(fatiguing easily today)  Assistive Device: Walker, rolling  Ambulation - Level of Assistance: Minimal assistance  Interventions: Safety awareness training;Verbal cues      Body Structures Involved:  1. Bones  2. Joints  3. Muscles  4. Ligaments Body Functions Affected:  1. Movement Related Activities and Participation Affected:  1.  Mobility   Number of elements that affect the Plan of Care: 3: MODERATE COMPLEXITY   CLINICAL PRESENTATION:   Presentation: Stable and uncomplicated: LOW COMPLEXITY   CLINICAL DECISION MAKING:   [facility] AM-PAC 6 Clicks   Basic Mobility Inpatient Short Form  How much difficulty does the patient currently have. .. Unable A Lot A Little None   1. Turning over in bed (including adjusting bedclothes, sheets and blankets)? [] 1   [x] 2   [] 3   [] 4   2. Sitting down on and standing up from a chair with arms ( e.g., wheelchair, bedside commode, etc.)   [] 1   [x] 2   [] 3   [] 4   3. Moving from lying on back to sitting on the side of the bed? [] 1   [x] 2   [] 3   [] 4   How much help from another person does the patient currently need. .. Total A Lot A Little None   4. Moving to and from a bed to a chair (including a wheelchair)? [] 1   [x] 2   [] 3   [] 4   5. Need to walk in hospital room? [x] 1   [] 2   [] 3   [] 4   6. Climbing 3-5 steps with a railing? [x] 1   [] 2   [] 3   [] 4   © 2007, Trustees of [facility], under license to [employer]. All rights reserved      Score:  Initial: 10 Most Recent: X (Date: -- )    Interpretation of Tool:  Represents activities that are increasingly more difficult (i.e. Bed mobility, Transfers, Gait). Medical Necessity:     · Patient is expected to demonstrate progress in   · strength, range of motion, and balance  ·  to   · decrease assistance required with exercises and functional mobility  · . Reason for Services/Other Comments:  · Patient   · continues to require present interventions due to patient's inability to perform exercises and functional mobility independently  · .    Use of outcome tool(s) and clinical judgement create a POC that gives a: Questionable prediction of patient's progress: MODERATE COMPLEXITY            TREATMENT:   (In addition to Assessment/Re-Assessment sessions the following treatments were rendered)   Pre-treatment Symptoms/Complaints:  Pt reports that pain meds make him confused  Pain: Initial:  Numeric scale  Pain Intensity 1: 0  Post Session: 3/10     Therapeutic Activity: (  25 Minutes) : Therapeutic activities including TKA exercises as noted below, bed mobility, transfers, sit<> stand, progressive gait with rolling walker to improve mobility, strength and balance. Required minimal Safety awareness training;Verbal cues to promote static balance in standing. Date:  7/25 Date:  7/26/20 Date:     Activity/Exercise Parameters Parameters Parameters   Quad / gluteal sets 20 20    Ankle pumps 20 20    Heel slides 20 20    Hip ABD-ADD 20 20    SAQ's 20 20    SLR's 20aa 20    Chair slides 20 20           Date:  7/21 Date:  7/23 Date:   7/24   ACTIVITY/EXERCISE AM PM AM PM AM PM   GROUP THERAPY  []  []  []  []  []  []   Ankle Pumps 20   20 20    Quad Sets 20   20 20    Gluteal Sets 20   20 20    Hip ABd/ADduction 20aa   20 20    Straight Leg Raises 20aa   20 20aa    Knee Slides 20aa   20 20    Short Arc Quads 20    20    Long Arc Quads         Chair Slides     20               B = bilateral; AA = active assistive; A = active; P = passive     Braces/Orthotics/Lines/Etc:   · IV  · arnett catheter  · O2 Device: Nasal cannula  Treatment/Session Assessment:    · Response to Treatment:  Patient tolerated session well but fatigued easily, needing rest breaks. · Interdisciplinary Collaboration:   o Registered Nurse  · After treatment position/precautions:   o Up in chair  o Bed/Chair-wheels locked  o Call light within reach  o RN notified   · Compliance with Program/Exercises: Will assess as treatment progresses  · Recommendations/Intent for next treatment session: \"Next visit will focus on advancements to more challenging activities and reduction in assistance provided\".   Total Treatment Duration:  PT Patient Time In/Time Out  Time In: 5080  Time Out: [address] EBONY Velasquez

## 2020-07-26 NOTE — PROGRESS NOTES
Name: Rolando Kwong MRN: [id number]  : 1934  Age:85 y.o.  male  Admit Date:  7/15/2020 LOS: 10      Hospitalist Progress Note    Rolando Kwong is a 80 y.o. male with medical history significant for major depressive disorder/anxiety, neurogenic bladder with suprapubic catheter, hypertension, GERD who was admitted from home on 7/15 for confusion. Patient recently underwent right total knee arthroplasty on . Patient was found to be confused/dizzy by a friend on  then sent him to the ED. Patient was found to have A. fib RVR on arrival and met sepsis criteria. CXR is negative. She was recollected and patient was started on IV antibiotics. Ortho was consulted. Right TKA incision site appears to be healing well therefore it is unlikely the source of sepsis. There was noted to have some purulence around suprapubic catheter but culture has been negative to date. Patient appeared to develop mild volume overload and started on IV Lasix. He noted to have EMILI with elevation of creatinine on . He had episodes of fever spikes with T-max of 102.3  while on p.o. Keflex/doxycycline. Antibiotics was broadened to IV vancomycin and Zosyn and cultures were repeated. The blood cultures and urine cultures have been negative to date. Chest x-ray did not reveal any acute infiltrate. Subjective (20) :  Patient is seen and examined at bedside. Afebrile for over 72hours. Hemodynamically stable. Saturating well on room air. Patient reports that he has some nightmares Friday night but was able to sleep well last night. Patient believe he took sleep aide overnight on both nights but per [facility], he has not taken trazodone or ambien since  and  respectively. Outpatient med recs shows prescription for trazodone. Admits to having suicidal ideation and depression before but currently denies any suicidal ideation, depression.   Reports that he is looking forward to going to the ninth floor to get his physical therapy. Reports he would not mind speaking to a psychiatrist does not want to see \"the psychiatrist on TV\". Patient denies chills, chest pains, shortness of breath, n/v, abdominal pain. ROS: 10 point review of systems is otherwise negative with the exception of the elements mentioned above. Objective:    Patient Vitals for the past 24 hrs:   Temp Pulse Resp BP SpO2   07/26/20 0748 98.6 °F (37 °C) 79 16 150/78 93 %   07/26/20 0306 97.5 °F (36.4 °C) 93 16 162/75 94 %   07/25/20 2342 97.5 °F (36.4 °C) 80 16 152/76 94 %   07/25/20 2035 97.6 °F (36.4 °C) 93 16 158/79 92 %   07/25/20 1110 98.3 °F (36.8 °C) 82 16 122/73 92 %     Oxygen Therapy  O2 Sat (%): 93 % (07/26/20 0748)  Pulse via Oximetry: 102 beats per minute (07/23/20 0719)  O2 Device: Room air (07/24/20 0750)  O2 Flow Rate (L/min): 2 l/min (07/23/20 0719)  FIO2 (%): 28 % (07/23/20 0719)    Estimated body mass index is 31.93 kg/m² as calculated from the following:    Height as of this encounter: 6' 1\" (1.854 m). Weight as of this encounter: 109.8 kg (242 lb). Intake/Output Summary (Last 24 hours) at 7/26/2020 0801  Last data filed at 7/26/2020 0307  Gross per 24 hour   Intake    Output 1950 ml   Net -1950 ml       *Note that automatically entered I/Os may not be accurate; dependent on patient compliance with collection and accurate  by techs. Physical Exam:   General:     alert, awake, no acute distress. Well nourished. Obese  Head:   normocephalic, atraumatic  Eyes, Ears, nose: PERRL, EOMI. Normal conjunctiva  Neck:    supple, non-tender. Trachea midline. Lungs:   Bibasilar crackles, no wheezing  Cardiac:   RRR, Normal S1 and S2. Abdomen:   Soft, non distended, nontender, +BS,      (+) suprapubic catheter: site appears clean/dry without surrounding erythema   Extremities:   Warm, dry. B/l +1 pitting edema (R>L) with stasis dermatitis skin changes. Cooling brace to right knee.    Skin:   No rashes, no jaundice  Neuro:  AAOx3. No gross focal neurological deficit  Psychiatric:  No anxiety, calm, cooperative    Data Review:  I have reviewed all labs, meds, and studies from the last 24 hours:    Recent Results (from the past 24 hour(s))   CBC WITH AUTOMATED DIFF    Collection Time: 07/26/20  4:02 AM   Result Value Ref Range    WBC 14.5 (H) 4.3 - 11.1 K/uL    RBC 3.09 (L) 4.23 - 5.6 M/uL    HGB 9.4 (L) 13.6 - 17.2 g/dL    HCT 29.8 (L) 41.1 - 50.3 %    MCV 96.4 79.6 - 97.8 FL    MCH 30.4 26.1 - 32.9 PG    MCHC 31.5 31.4 - 35.0 g/dL    RDW 13.5 11.9 - 14.6 %    PLATELET 917 497 - 997 K/uL    MPV 8.5 (L) 9.4 - 12.3 FL    ABSOLUTE NRBC 0.00 0.0 - 0.2 K/uL    DF AUTOMATED      NEUTROPHILS 77 43 - 78 %    LYMPHOCYTES 11 (L) 13 - 44 %    MONOCYTES 8 4.0 - 12.0 %    EOSINOPHILS 3 0.5 - 7.8 %    BASOPHILS 1 0.0 - 2.0 %    IMMATURE GRANULOCYTES 1 0.0 - 5.0 %    ABS. NEUTROPHILS 11.1 (H) 1.7 - 8.2 K/UL    ABS. LYMPHOCYTES 1.5 0.5 - 4.6 K/UL    ABS. MONOCYTES 1.2 0.1 - 1.3 K/UL    ABS. EOSINOPHILS 0.4 0.0 - 0.8 K/UL    ABS. BASOPHILS 0.1 0.0 - 0.2 K/UL    ABS. IMM.  GRANS. 0.2 0.0 - 0.5 K/UL   METABOLIC PANEL, BASIC    Collection Time: 07/26/20  4:02 AM   Result Value Ref Range    Sodium 137 136 - 145 mmol/L    Potassium 4.0 3.5 - 5.1 mmol/L    Chloride 104 98 - 107 mmol/L    CO2 30 21 - 32 mmol/L    Anion gap 3 (L) 7 - 16 mmol/L    Glucose 109 (H) 65 - 100 mg/dL    BUN 20 8 - 23 MG/DL    Creatinine 0.97 0.8 - 1.5 MG/DL    GFR est AA >60 >60 ml/min/1.73m2    GFR est non-AA >60 >60 ml/min/1.73m2    Calcium 9.2 8.3 - 10.4 MG/DL        All Micro Results     Procedure Component Value Units Date/Time    CULTURE, BLOOD [[id number]] Collected:  07/21/20 1724    Order Status:  Completed Specimen:  Blood Updated:  07/26/20 0725     Special Requests: --        RIGHT  Antecubital       Culture result: NO GROWTH 5 DAYS       CULTURE, BLOOD [[id number]] Collected:  07/21/20 1742    Order Status:  Completed Specimen:  Blood Updated:  07/26/20 0725     Special Requests: --        RIGHT  HAND       Culture result: NO GROWTH 5 DAYS       CULTURE, BLOOD [[id number]] Collected:  07/15/20 2249    Order Status:  Completed Specimen:  Blood Updated:  07/20/20 0726     Special Requests: --        RIGHT  Antecubital       Culture result: NO GROWTH 5 DAYS       CULTURE, BLOOD [[id number]] Collected:  07/15/20 2334    Order Status:  Completed Specimen:  Blood Updated:  07/20/20 0726     Special Requests: --        RIGHT  HAND       Culture result: NO GROWTH 5 DAYS       CULTURE, Julieta Bark STAIN [[id number]] Collected:  07/16/20 1213    Order Status:  Completed Specimen:  Wound from Skin Updated:  07/19/20 0730     Special Requests: SUPRAPUBIC     GRAM STAIN 1 TO 5 WBCS SEEN PER OIF      NO DEFINITE ORGANISM SEEN        Culture result: NO GROWTH 2 DAYS       CULTURE, URINE [[id number]] Collected:  07/15/20 2321    Order Status:  Completed Specimen:  Urine Updated:  07/18/20 0837     Special Requests: NO SPECIAL REQUESTS        Culture result:       <10,000 COLONIES/mL MIXED SKIN MOE ISOLATED                Current Meds:  Current Facility-Administered Medications   Medication Dose Route Frequency    bisacodyL (DULCOLAX) tablet 5 mg  5 mg Oral DAILY    piperacillin-tazobactam (ZOSYN) 3.375 g in 0.9% sodium chloride (MBP/ADV) 100 mL  3.375 g IntraVENous Q8H    bisacodyL (DULCOLAX) suppository 10 mg  10 mg Rectal DAILY PRN    acetaminophen (TYLENOL) tablet 1,000 mg  1,000 mg Oral Q6H PRN    sodium chloride (NS) flush 5-40 mL  5-40 mL IntraVENous Q8H    sodium chloride (NS) flush 5-40 mL  5-40 mL IntraVENous PRN    HYDROcodone-acetaminophen (NORCO) 5-325 mg per tablet 1 Tab  1 Tab Oral Q4H PRN    naloxone (NARCAN) injection 0.4 mg  0.4 mg IntraVENous PRN    ondansetron (ZOFRAN) injection 4 mg  4 mg IntraVENous Q4H PRN    senna-docusate (PERICOLACE) 8.6-50 mg per tablet 2 Tab  2 Tab Oral DAILY PRN    LORazepam (ATIVAN) tablet 0.5 mg  0.5 mg Oral Q4H PRN    zolpidem (AMBIEN) tablet 5 mg  5 mg Oral QHS PRN    aspirin delayed-release tablet 81 mg  81 mg Oral BID    citalopram (CELEXA) tablet 20 mg  20 mg Oral DAILY    [Held by provider] losartan (COZAAR) tablet 25 mg  25 mg Oral QHS    metoprolol succinate (TOPROL-XL) XL tablet 100 mg  100 mg Oral QHS    pantoprazole (PROTONIX) tablet 40 mg  40 mg Oral ACB    oxyCODONE IR (ROXICODONE) tablet 5 mg  5 mg Oral Q4H PRN    polyethylene glycol (MIRALAX) packet 17 g  17 g Oral DAILY    senna-docusate (PERICOLACE) 8.6-50 mg per tablet 1 Tab  1 Tab Oral DAILY    traZODone (DESYREL) tablet 100 mg  100 mg Oral QHS PRN    metoprolol (LOPRESSOR) injection 5 mg  5 mg IntraVENous Q4H PRN         Other Studies:  Xr Chest Port    Result Date: 7/15/2020  EXAM: Chest x-ray. INDICATION: Dyspnea. COMPARISON: None. TECHNIQUE: Single frontal view. FINDINGS: The lungs are clear. The heart is enlarged. Mediastinal contour and pulmonary vasculature are within normal limits. No pneumothorax or pleural effusion is seen. IMPRESSION: 1. No acute process. 2. Cardiomegaly. Xr Knee Rt 3 V    Result Date: 7/16/2020  Portable right knee  7/16/2020 1:20 PM Indication: Patient immediately status post TKA Comparison: None available at this hospital PACS Findings: Portable AP, oblique and crosstable lateral views from 1246 are submitted. There is an anteriorly located vertically oriented skin staple line. There is soft tissues swelling and trace collections of air in the soft tissues and joints. No unexpected radiopaque foreign body. TKA prosthesis components appear in appropriate alignment with no acute complicating features. No bony fracture. Impression: Expected immediate postop TKA findings. No acute complicating features. Duplex Lower Ext Venous Right    Result Date: 7/16/2020  RIGHT LOWER EXTREMITY DEEP VENOUS SONOGRAPHY: CLINICAL HISTORY: Leg pain and swelling after recent right knee surgery.  FINDINGS: Multiple images from real time ultrasound evaluation of the deep venous system of the right leg demonstrate normal venous flow in the posterior tibial, popliteal, and superficial and common femoral veins. Normal compressibility was demonstrated. Flow was also documented in the proximal saphenous and deep femoral veins. No intraluminal echogenic material was seen to suggest the presence of nonobstructive thrombus. IMPRESSION: NO ULTRASOUND EVIDENCE OF DEEP VENOUS THROMBOSIS IN THE RIGHT LEG. Assessment and Plan: Active Hospital Problems    Diagnosis Date Noted    EMILI (acute kidney injury) ([facility] 07/20/2020    Atrial fibrillation with RVR ([facility] 07/16/2020    Sepsis ([facility] 07/16/2020    Neurogenic bladder 07/16/2020    GERD (gastroesophageal reflux disease)      controlled w/med      Hypertension      Plan:    Sepsis likely from suprapubic cath site   All cultures have been neg to date. Febrile episode with Tmax of 102.3 on 7/22 overnight while on PO keflex/doxy since 7/18. Continues to have leukocytosis, febrile episode, elevated heart rate meeting SIRS criteria for sepsis of unknown etiology. Repeat UA on 7/21 is negative for UTI. Repeat CXR (7/22) without any new consolidation/infiltrates. Repeat blood culture(7/21) neg to date  - s/p 3 days of vancomycin(7/22-7/24), doxy/keflex(7/28-7/21)  - continue with zosyn for now (7/22 - )    Afib RVR (resolved)  Likely due to acute illness/sepsis. Now sinus rhythm with possible PVC or aberrant complexes  - continue with metoprolol XL 100mg qHS  - no AC for now given recent procedure     MDD/Anxiety  - continue with celexa, PRN ativan.   - per notes, he had suicidal ideation a few nights ago but he has denied any SI to me and during psych consult    Hypertension: continue with home medications  GERD: continue with PPI    Hypoxia likely due to underlying sepsis and volume overload(resolved)  EMILI(resolved)      Diet:  DIET REGULAR  DVT PPx: SCDs  Code: Full Code  Dispo: [facility] vs 9th floor pending  Estimated Discharge:  72 hrs if remains afebrile    Labs/Imaging Reviewed. Patient is moderate risk due to current condition and comorbid conditions as well as requiring frequent monitoring. Plan discussed with staff, patient/family and are in agreement. Time Spent: Greater than 45 minutes was spent in reviewing charts, physical exam, discussion with patient, and answered any questions.     Signed By: Kush Comer MD     July 26, 2020

## 2020-07-26 NOTE — PROGRESS NOTES
Medicated with Zofran 4 mg IV as requested for nausea, ativan 0.5 mg PO as requested for anxiety and oxycodone 5 mg PO as requested for pain, see MAR.

## 2020-07-26 NOTE — PROGRESS NOTES
Shift assessment complete, see flow sheet for full assessment. Pt alert and oriented x4, denies pain at this time. Respirations even and unlabored breath sounds clear bilaterally. S1 S2 auscultated, VSS. Skin warm, dry . Abdomen soft and non tender, bowel sounds active in all quadrants. Bed low and locked, call light within reach, pt advised to call if assistance is needed.

## 2020-07-27 LAB
ANION GAP SERPL CALC-SCNC: 8 MMOL/L (ref 7–16)
BASOPHILS # BLD: 0.1 K/UL (ref 0–0.2)
BASOPHILS NFR BLD: 1 % (ref 0–2)
BUN SERPL-MCNC: 15 MG/DL (ref 8–23)
CALCIUM SERPL-MCNC: 8.9 MG/DL (ref 8.3–10.4)
CHLORIDE SERPL-SCNC: 103 MMOL/L (ref 98–107)
CO2 SERPL-SCNC: 27 MMOL/L (ref 21–32)
CREAT SERPL-MCNC: 1.01 MG/DL (ref 0.8–1.5)
DIFFERENTIAL METHOD BLD: ABNORMAL
EOSINOPHIL # BLD: 0.3 K/UL (ref 0–0.8)
EOSINOPHIL NFR BLD: 2 % (ref 0.5–7.8)
ERYTHROCYTE [DISTWIDTH] IN BLOOD BY AUTOMATED COUNT: 13.8 % (ref 11.9–14.6)
GLUCOSE SERPL-MCNC: 113 MG/DL (ref 65–100)
HCT VFR BLD AUTO: 30.1 % (ref 41.1–50.3)
HGB BLD-MCNC: 9.4 G/DL (ref 13.6–17.2)
IMM GRANULOCYTES # BLD AUTO: 0.1 K/UL (ref 0–0.5)
IMM GRANULOCYTES NFR BLD AUTO: 1 % (ref 0–5)
LYMPHOCYTES # BLD: 1.6 K/UL (ref 0.5–4.6)
LYMPHOCYTES NFR BLD: 12 % (ref 13–44)
MCH RBC QN AUTO: 30 PG (ref 26.1–32.9)
MCHC RBC AUTO-ENTMCNC: 31.2 G/DL (ref 31.4–35)
MCV RBC AUTO: 96.2 FL (ref 79.6–97.8)
MONOCYTES # BLD: 1.3 K/UL (ref 0.1–1.3)
MONOCYTES NFR BLD: 9 % (ref 4–12)
NEUTS SEG # BLD: 10.3 K/UL (ref 1.7–8.2)
NEUTS SEG NFR BLD: 75 % (ref 43–78)
NRBC # BLD: 0 K/UL (ref 0–0.2)
PLATELET # BLD AUTO: 423 K/UL (ref 150–450)
PMV BLD AUTO: 8.5 FL (ref 9.4–12.3)
POTASSIUM SERPL-SCNC: 3.9 MMOL/L (ref 3.5–5.1)
RBC # BLD AUTO: 3.13 M/UL (ref 4.23–5.6)
SODIUM SERPL-SCNC: 138 MMOL/L (ref 136–145)
WBC # BLD AUTO: 13.7 K/UL (ref 4.3–11.1)

## 2020-07-27 PROCEDURE — 80048 BASIC METABOLIC PNL TOTAL CA: CPT

## 2020-07-27 PROCEDURE — 3331090001 HH PPS REVENUE CREDIT

## 2020-07-27 PROCEDURE — 74011000258 HC RX REV CODE- 258: Performed by: INTERNAL MEDICINE

## 2020-07-27 PROCEDURE — 97530 THERAPEUTIC ACTIVITIES: CPT

## 2020-07-27 PROCEDURE — 74011250637 HC RX REV CODE- 250/637: Performed by: NURSE PRACTITIONER

## 2020-07-27 PROCEDURE — 97535 SELF CARE MNGMENT TRAINING: CPT

## 2020-07-27 PROCEDURE — 74011250636 HC RX REV CODE- 250/636: Performed by: INTERNAL MEDICINE

## 2020-07-27 PROCEDURE — 85025 COMPLETE CBC W/AUTO DIFF WBC: CPT

## 2020-07-27 PROCEDURE — 74011250637 HC RX REV CODE- 250/637: Performed by: INTERNAL MEDICINE

## 2020-07-27 PROCEDURE — 3331090002 HH PPS REVENUE DEBIT

## 2020-07-27 PROCEDURE — 36415 COLL VENOUS BLD VENIPUNCTURE: CPT

## 2020-07-27 PROCEDURE — 65270000029 HC RM PRIVATE

## 2020-07-27 PROCEDURE — 77030008031

## 2020-07-27 RX ADMIN — LORAZEPAM 0.5 MG: 0.5 TABLET ORAL at 14:40

## 2020-07-27 RX ADMIN — LORAZEPAM 0.5 MG: 0.5 TABLET ORAL at 09:16

## 2020-07-27 RX ADMIN — ACETAMINOPHEN 1000 MG: 500 TABLET, FILM COATED ORAL at 23:54

## 2020-07-27 RX ADMIN — CITALOPRAM HYDROBROMIDE 20 MG: 20 TABLET ORAL at 09:16

## 2020-07-27 RX ADMIN — METOPROLOL SUCCINATE 100 MG: 50 TABLET, EXTENDED RELEASE ORAL at 23:54

## 2020-07-27 RX ADMIN — ASPIRIN 81 MG: 81 TABLET, COATED ORAL at 16:34

## 2020-07-27 RX ADMIN — LORAZEPAM 0.5 MG: 0.5 TABLET ORAL at 03:46

## 2020-07-27 RX ADMIN — LORAZEPAM 0.5 MG: 0.5 TABLET ORAL at 23:55

## 2020-07-27 RX ADMIN — ONDANSETRON 4 MG: 2 INJECTION INTRAMUSCULAR; INTRAVENOUS at 05:28

## 2020-07-27 RX ADMIN — ASPIRIN 81 MG: 81 TABLET, COATED ORAL at 09:17

## 2020-07-27 RX ADMIN — PIPERACILLIN AND TAZOBACTAM 3.38 G: 3; .375 INJECTION, POWDER, LYOPHILIZED, FOR SOLUTION INTRAVENOUS at 16:00

## 2020-07-27 RX ADMIN — BISACODYL 5 MG: 5 TABLET, COATED ORAL at 09:17

## 2020-07-27 RX ADMIN — PIPERACILLIN AND TAZOBACTAM 3.38 G: 3; .375 INJECTION, POWDER, LYOPHILIZED, FOR SOLUTION INTRAVENOUS at 09:16

## 2020-07-27 RX ADMIN — ACETAMINOPHEN 1000 MG: 500 TABLET, FILM COATED ORAL at 14:40

## 2020-07-27 RX ADMIN — LORAZEPAM 0.5 MG: 0.5 TABLET ORAL at 20:00

## 2020-07-27 NOTE — DISCHARGE SUMMARY
Hospitalist Discharge Summary     Admit Date:  7/15/2020 10:37 PM   Name:  Jens Sim   Age:  80 y.o.  :  1934   MRN:  [id number]   PCP:  Shabnam Lloyd MD  Treatment Team: Attending Provider: Olivia Flannery MD; Utilization Review: Abimbola Beach RN; Care Manager: Maurice Roque [facility]; Consulting Provider: Ayden Norman MD; Consulting Provider: Haley Kenyon MD; Care Manager: Leon Saldana; Physical Therapist: Tano Hernandez PT; Occupational Therapist: Robi Corcoran OT    Problem List for this Hospitalization:  Active Hospital Problems    Diagnosis Date Noted    EMILI (acute kidney injury) ([facility] 2020    Atrial fibrillation with RVR ([facility] 2020    [facility]    Neurogenic bladder 2020    GERD (gastroesophageal reflux disease)      controlled w/med      Hypertension          Hospital Course :  Please refer to the admission H&P for details of presentation. In summary, 80 y.o. male with medical history significant for major depressive disorder/anxiety, neurogenic bladder with suprapubic catheter, hypertension, GERD who was admitted from home on 7/15 for confusion. Patient recently underwent right total knee arthroplasty on . Patient was found to be confused/dizzy by a friend on  then sent him to the ED. Patient was found to have A. fib RVR on arrival and met sepsis criteria. CXR is negative. She was recollected and patient was started on IV antibiotics. Ortho was consulted. Right TKA incision site appears to be healing well therefore it is unlikely the source of sepsis. There was noted to have some purulence around suprapubic catheter but culture has been negative to date. Patient appeared to develop mild volume overload and started on IV Lasix. He noted to have EMILI with elevation of creatinine on . He had episodes of fever spikes with T-max of 102.3  while on p.o. Keflex/doxycycline. Antibiotics was broadened to IV vancomycin and Zosyn and cultures were repeated. The blood cultures and urine cultures have been negative to date. Chest x-ray did not reveal any acute infiltrate. He has been saturating well on room air and completed a course of abx. Patient was evaluated by acute rehab and has been [facility]. Patient is medically stable for discharge. Disposition:   Activity: Activity as tolerated  Diet: DIET REGULAR  Code Status: Full Code      Follow up instructions, discharge meds at bottom of this note. Plan was discussed with patient/family. All questions answered. Patient was stable at time of discharge. Patient will call a physician or return if any concerns. Diagnostic Imaging/Tests:   Xr Chest Port    Result Date: 7/15/2020  EXAM: Chest x-ray. INDICATION: Dyspnea. COMPARISON: None. TECHNIQUE: Single frontal view. FINDINGS: The lungs are clear. The heart is enlarged. Mediastinal contour and pulmonary vasculature are within normal limits. No pneumothorax or pleural effusion is seen. IMPRESSION: 1. No acute process. 2. Cardiomegaly. Xr Knee Rt 3 V    Result Date: 7/16/2020  Portable right knee  7/16/2020 1:20 PM Indication: Patient immediately status post TKA Comparison: None available at this hospital PACS Findings: Portable AP, oblique and crosstable lateral views from 1246 are submitted. There is an anteriorly located vertically oriented skin staple line. There is soft tissues swelling and trace collections of air in the soft tissues and joints. No unexpected radiopaque foreign body. TKA prosthesis components appear in appropriate alignment with no acute complicating features. No bony fracture. Impression: Expected immediate postop TKA findings. No acute complicating features.      Duplex Lower Ext Venous Right    Result Date: 7/16/2020  RIGHT LOWER EXTREMITY DEEP VENOUS SONOGRAPHY: CLINICAL HISTORY: Leg pain and swelling after recent right knee surgery. FINDINGS: Multiple images from real time ultrasound evaluation of the deep venous system of the right leg demonstrate normal venous flow in the posterior tibial, popliteal, and superficial and common femoral veins. Normal compressibility was demonstrated. Flow was also documented in the proximal saphenous and deep femoral veins. No intraluminal echogenic material was seen to suggest the presence of nonobstructive thrombus. IMPRESSION: NO ULTRASOUND EVIDENCE OF DEEP VENOUS THROMBOSIS IN THE RIGHT LEG. Echocardiogram results:  Results for orders placed or performed during the hospital encounter of 07/15/20   2D ECHO COMPLETE ADULT (TTE) W OR WO CONTR    Narrative    [place]  [address] [facility], [address] [facility]  [phone]    Transthoracic Echocardiogram  2D, M-mode, Doppler, and Color Doppler    Patient: Mishel Richardson  MR #: [id number]  : 1934  Age: 80 years  Gender: Male  Study date: 2020  Account #: [de-identified]  Height: 73 in  Weight: 242.4 lb  BSA: 2.34 mï¾²  Status:Routine  Location: [facility]  BP: 117/ 76    Allergies: NO KNOWN ALLERGENS    Sonographer:  Magali Avila RD  Group:  [id number] Cardiology  Referring Physician:  PREM Gaxiola  Reading Physician:  Marleny Castillo MD    INDICATIONS: Rapid Afib. PROCEDURE: This was a routine study. A transthoracic echocardiogram was  performed. The study included complete 2D imaging, M-mode, complete spectral  Doppler, and color Doppler. Intravenous contrast (Definity) was administered. Echocardiographic views were limited by restricted patient mobility. Image  quality was adequate. LEFT VENTRICLE: Size was normal. Systolic function was normal. Ejection  fraction was estimated in the range of 55 % to 60 %. This study was   inadequate  for the evaluation of regional wall motion.  Wall thickness was normal. The  study was not technically sufficient to allow evaluation of LV diastolic  function. RIGHT VENTRICLE: The size was normal. Systolic function was normal. Estimated  peak pressure was in the range of 30-35 mmHg. LEFT ATRIUM: Size was normal. LA Volume Index=19mL/m^2. RIGHT ATRIUM: Size was normal.    SYSTEMIC VEINS: IVC: The inferior vena cava was not well visualized. AORTIC VALVE: The valve was structurally normal, tri-commissural. There was   no  evidence for stenosis. There was trivial regurgitation. MITRAL VALVE: Valve structure was normal. There was no evidence for stenosis. There was no regurgitation. TRICUSPID VALVE: The valve structure was normal. There was no evidence for  stenosis. There was mild regurgitation. PULMONIC VALVE: The valve structure was normal. There was no evidence for  stenosis. There was no insufficiency. PERICARDIUM: There was no pericardial effusion. AORTA: The root exhibited mild dilatation. There was mild dilatation of the  ascending aorta. SUMMARY:    -  Left ventricle: Systolic function was normal. Ejection fraction was  estimated in the range of 55 % to 60 %. This study was inadequate for the  evaluation of regional wall motion.    -  Tricuspid valve: There was mild regurgitation.    -  Aorta, systemic arteries: The root exhibited mild dilatation. There was   mild  dilatation of the ascending aorta. SYSTEM MEASUREMENT TABLES    2D mode  AoR Diam (2D): 3.9 cm  IVS/LVPW (2D): 1  IVSd (2D): 1.2 cm  LVIDd (2D): 4.4 cm  LVIDs (2D): 2.6 cm  LVOT Area (2D): 3.1 cm2  LVPWd (2D): 1.2 cm  RVIDd (2D): 4 cm    Unspecified Scan Mode  Peak Grad; Mean; Antegrade Flow: 6 mm[Hg]  Vmax;  Antegrade Flow: 124 cm/s  LVOT Diam: 2 cm    Prepared and signed by    Nataly Nye MD  Signed 21-Jul-2020 11:24:23         Procedures done this admission:  * No surgery found *    All Micro Results     Procedure Component Value Units Date/Time    CULTURE, BLOOD [[id number]] Collected:  07/21/20 1724    Order Status:  Completed Specimen:  Blood Updated:  07/26/20 0725     Special Requests: --        RIGHT  Antecubital       Culture result: NO GROWTH 5 DAYS       CULTURE, BLOOD [[id number]] Collected:  07/21/20 1742    Order Status:  Completed Specimen:  Blood Updated:  07/26/20 0725     Special Requests: --        RIGHT  HAND       Culture result: NO GROWTH 5 DAYS       CULTURE, BLOOD [[id number]] Collected:  07/15/20 2249    Order Status:  Completed Specimen:  Blood Updated:  07/20/20 0726     Special Requests: --        RIGHT  Antecubital       Culture result: NO GROWTH 5 DAYS       CULTURE, BLOOD [[id number]] Collected:  07/15/20 2334    Order Status:  Completed Specimen:  Blood Updated:  07/20/20 0726     Special Requests: --        RIGHT  HAND       Culture result: NO GROWTH 5 DAYS       CULTURE, Tsosie Beat STAIN [[id number]] Collected:  07/16/20 1213    Order Status:  Completed Specimen:  Wound from Skin Updated:  07/19/20 0730     Special Requests: SUPRAPUBIC     GRAM STAIN 1 TO 5 WBCS SEEN PER OIF      NO DEFINITE ORGANISM SEEN        Culture result: NO GROWTH 2 DAYS       CULTURE, URINE [[id number]] Collected:  07/15/20 2321    Order Status:  Completed Specimen:  Urine Updated:  07/18/20 0837     Special Requests: NO SPECIAL REQUESTS        Culture result:       <10,000 COLONIES/mL MIXED SKIN MOE ISOLATED                Labs: Results:       BMP, Mg, Phos Recent Labs     07/27/20  0330 07/26/20  0402 07/25/20 0338    137 138   K 3.9 4.0 3.9    104 105   CO2 27 30 29   AGAP 8 3* 4*   BUN 15 20 23   CREA 1.01 0.97 0.99   CA 8.9 9.2 8.3   * 109* 102*      CBC Recent Labs     07/27/20  0330 07/26/20  0402 07/25/20  0338   WBC 13.7* 14.5* 14.8*   RBC 3.13* 3.09* 2.87*   HGB 9.4* 9.4* 8.8*   HCT 30.1* 29.8* 28.8*    424 368   GRANS 75 77 75   LYMPH 12* 11* 12*   EOS 2 3 3   MONOS 9 8 9   BASOS 1 1 1   IG 1 1 1   ANEU 10.3* 11.1* 11.1*   ABL 1.6 1.5 1.7   LUCY 0.3 0.4 0.4   ABM 1.3 1. 2 1.3   ABB 0.1 0.1 0.1   AIG 0.1 0.2 0.2      LFT No results for input(s): ALT, TBIL, AP, TP, ALB, GLOB, AGRAT in the last 72 hours.     No lab exists for component: SGOT, GPT   Cardiac Testing Lab Results   Component Value Date/Time     07/15/2020 10:49 PM      Coagulation Tests Lab Results   Component Value Date/Time    Prothrombin time 13.3 07/06/2020 04:07 PM    Prothrombin time 10.1 10/23/2012 02:25 PM    Prothrombin time 10.2 08/21/2012 11:53 AM    INR 1.0 07/06/2020 04:07 PM    INR 1.0 10/23/2012 02:25 PM    INR 1.0 08/21/2012 11:53 AM    aPTT 28.0 07/06/2020 04:07 PM    aPTT 27.6 10/23/2012 02:25 PM    aPTT 25.3 08/21/2012 11:53 AM      A1c Lab Results   Component Value Date/Time    Hemoglobin A1c 6.3 07/06/2020 04:07 PM      Lipid Panel No results found for: CHOL, CHOLPOCT, CHOLX, CHLST, CHOLV, 584039, HDL, HDLP, LDL, LDLC, DLDLP, 085259, VLDLC, VLDL, TGLX, TRIGL, TRIGP, TGLPOCT, CHHD, [facility]   Thyroid Panel Lab Results   Component Value Date/Time    TSH 3.590 01/30/2018 04:15 PM        Most Recent UA Lab Results   Component Value Date/Time    Color BLANCA 07/21/2020 06:35 PM    Appearance CLEAR 07/21/2020 06:35 PM    Specific gravity 1.021 07/21/2020 06:35 PM    pH (UA) 6.5 07/21/2020 06:35 PM    Protein TRACE (A) 07/21/2020 06:35 PM    Glucose Negative 07/21/2020 06:35 PM    Ketone Negative 07/21/2020 06:35 PM    Bilirubin Negative 07/21/2020 06:35 PM    Blood Negative 07/21/2020 06:35 PM    Urobilinogen 4.0 (H) 07/21/2020 06:35 PM    Nitrites Negative 07/21/2020 06:35 PM    Leukocyte Esterase Negative 07/21/2020 06:35 PM    WBC 0-3 07/21/2020 06:35 PM    RBC 0-3 07/21/2020 06:35 PM    Epithelial cells 0-3 07/21/2020 06:35 PM    Bacteria 0 07/21/2020 06:35 PM    Casts 0 07/21/2020 06:35 PM    Other observations RESULTS VERIFIED MANUALLY 07/15/2020 11:21 PM        No Known Allergies  Immunization History   Administered Date(s) Administered    (RETIRED) Pneumococcal Vaccine (Unspecified Type) 10/21/2011    Influenza Vaccine Split 10/21/2011    TB Skin Test (PPD) Intradermal 07/19/2020       All Labs from Last 24 Hrs:  Recent Results (from the past 24 hour(s))   CBC WITH AUTOMATED DIFF    Collection Time: 07/27/20  3:30 AM   Result Value Ref Range    WBC 13.7 (H) 4.3 - 11.1 K/uL    RBC 3.13 (L) 4.23 - 5.6 M/uL    HGB 9.4 (L) 13.6 - 17.2 g/dL    HCT 30.1 (L) 41.1 - 50.3 %    MCV 96.2 79.6 - 97.8 FL    MCH 30.0 26.1 - 32.9 PG    MCHC 31.2 (L) 31.4 - 35.0 g/dL    RDW 13.8 11.9 - 14.6 %    PLATELET 652 289 - 811 K/uL    MPV 8.5 (L) 9.4 - 12.3 FL    ABSOLUTE NRBC 0.00 0.0 - 0.2 K/uL    DF AUTOMATED      NEUTROPHILS 75 43 - 78 %    LYMPHOCYTES 12 (L) 13 - 44 %    MONOCYTES 9 4.0 - 12.0 %    EOSINOPHILS 2 0.5 - 7.8 %    BASOPHILS 1 0.0 - 2.0 %    IMMATURE GRANULOCYTES 1 0.0 - 5.0 %    ABS. NEUTROPHILS 10.3 (H) 1.7 - 8.2 K/UL    ABS. LYMPHOCYTES 1.6 0.5 - 4.6 K/UL    ABS. MONOCYTES 1.3 0.1 - 1.3 K/UL    ABS. EOSINOPHILS 0.3 0.0 - 0.8 K/UL    ABS. BASOPHILS 0.1 0.0 - 0.2 K/UL    ABS. IMM.  GRANS. 0.1 0.0 - 0.5 K/UL   METABOLIC PANEL, BASIC    Collection Time: 07/27/20  3:30 AM   Result Value Ref Range    Sodium 138 136 - 145 mmol/L    Potassium 3.9 3.5 - 5.1 mmol/L    Chloride 103 98 - 107 mmol/L    CO2 27 21 - 32 mmol/L    Anion gap 8 7 - 16 mmol/L    Glucose 113 (H) 65 - 100 mg/dL    BUN 15 8 - 23 MG/DL    Creatinine 1.01 0.8 - 1.5 MG/DL    GFR est AA >60 >60 ml/min/1.73m2    GFR est non-AA >60 >60 ml/min/1.73m2    Calcium 8.9 8.3 - 10.4 MG/DL       Current Med List in Hospital:   Current Facility-Administered Medications   Medication Dose Route Frequency    bisacodyL (DULCOLAX) tablet 5 mg  5 mg Oral DAILY    piperacillin-tazobactam (ZOSYN) 3.375 g in 0.9% sodium chloride (MBP/ADV) 100 mL  3.375 g IntraVENous Q8H    bisacodyL (DULCOLAX) suppository 10 mg  10 mg Rectal DAILY PRN    acetaminophen (TYLENOL) tablet 1,000 mg  1,000 mg Oral Q6H PRN    sodium chloride (NS) flush 5-40 mL  5-40 mL IntraVENous Q8H  sodium chloride (NS) flush 5-40 mL  5-40 mL IntraVENous PRN    HYDROcodone-acetaminophen (NORCO) 5-325 mg per tablet 1 Tab  1 Tab Oral Q4H PRN    naloxone (NARCAN) injection 0.4 mg  0.4 mg IntraVENous PRN    ondansetron (ZOFRAN) injection 4 mg  4 mg IntraVENous Q4H PRN    senna-docusate (PERICOLACE) 8.6-50 mg per tablet 2 Tab  2 Tab Oral DAILY PRN    LORazepam (ATIVAN) tablet 0.5 mg  0.5 mg Oral Q4H PRN    aspirin delayed-release tablet 81 mg  81 mg Oral BID    citalopram (CELEXA) tablet 20 mg  20 mg Oral DAILY    [Held by provider] losartan (COZAAR) tablet 25 mg  25 mg Oral QHS    metoprolol succinate (TOPROL-XL) XL tablet 100 mg  100 mg Oral QHS    pantoprazole (PROTONIX) tablet 40 mg  40 mg Oral ACB    oxyCODONE IR (ROXICODONE) tablet 5 mg  5 mg Oral Q4H PRN    polyethylene glycol (MIRALAX) packet 17 g  17 g Oral DAILY    senna-docusate (PERICOLACE) 8.6-50 mg per tablet 1 Tab  1 Tab Oral DAILY    traZODone (DESYREL) tablet 100 mg  100 mg Oral QHS PRN    metoprolol (LOPRESSOR) injection 5 mg  5 mg IntraVENous Q4H PRN       Discharge Exam:  Patient Vitals for the past 24 hrs:   Temp Pulse Resp BP SpO2   07/27/20 1043 98 °F (36.7 °C) 94  121/78 92 %   07/27/20 0652 98 °F (36.7 °C) 91  119/69 93 %   07/27/20 0350 98.8 °F (37.1 °C) (!) 107 16 122/68 91 %   07/27/20 0020 98.9 °F (37.2 °C) (!) 110 16 115/65 91 %   07/26/20 1945 98.6 °F (37 °C) (!) 120 16 147/74 92 %   07/26/20 1542 98.6 °F (37 °C) 88 16 120/65 90 %   07/26/20 1132 98.4 °F (36.9 °C) 79 16 131/71 91 %     Oxygen Therapy  O2 Sat (%): 92 % (07/27/20 1043)  Pulse via Oximetry: 102 beats per minute (07/23/20 0719)  O2 Device: Room air (07/24/20 0750)  O2 Flow Rate (L/min): 2 l/min (07/23/20 0719)  FIO2 (%): 28 % (07/23/20 0719)    Estimated body mass index is 31.93 kg/m² as calculated from the following:    Height as of this encounter: 6' 1\" (1.854 m). Weight as of this encounter: 109.8 kg (242 lb).       Intake/Output Summary (Last 24 hours) at 7/27/2020 1118  Last data filed at 7/26/2020 2351  Gross per 24 hour   Intake 360 ml   Output 2500 ml   Net -2140 ml       *Note that automatically entered I/Os may not be accurate; dependent on patient compliance with collection and accurate  by assistants. Physical Exam:     General:                     alert, awake, no acute distress. Well nourished. Obese  Head:                          normocephalic, atraumatic  Eyes, Ears, nose:      PERRL, EOMI. Normal conjunctiva  Neck:                          supple, non-tender. Trachea midline. Lungs:                        Bibasilar crackles, no wheezing  Cardiac:                      RRR, Normal S1 and S2. Abdomen:                  Soft, non distended, nontender, +BS,                                       (+) suprapubic catheter: site appears clean/dry without surrounding erythema   Extremities:               Warm, dry. B/l +1 pitting edema (R>L) with stasis dermatitis skin changes. Cooling brace to right knee. Skin:                           No rashes, no jaundice  Neuro:             AAOx3. No gross focal neurological deficit  Psychiatric:                No anxiety, calm, cooperative    Discharge Info:   Current Discharge Medication List      CONTINUE these medications which have NOT CHANGED    Details   acetaminophen (TYLENOL) 500 mg tablet Take 2 Tabs by mouth every six (6) hours as needed for Pain. Qty: 60 Tab, Refills: 0      aspirin delayed-release 81 mg tablet Take 1 Tab by mouth two (2) times a day. Qty: 60 Tab, Refills: 0      ondansetron (ZOFRAN ODT) 8 mg disintegrating tablet Take 1 Tab by mouth every eight (8) hours as needed for Nausea or Vomiting. Qty: 30 Tab, Refills: 0      meloxicam (MOBIC) 7.5 mg tablet Take 1 Tab by mouth daily. Qty: 30 Tab, Refills: 2      senna-docusate (PERICOLACE) 8.6-50 mg per tablet Take 1 Tab by mouth daily.   Qty: 30 Tab, Refills: 0      citalopram (CELEXA) 20 mg tablet TAKE ONE TABLET BY MOUTH EVERY DAY. Qty: 90 Tab, Refills: 2    Associated Diagnoses: Depression, unspecified depression type      hyoscyamine SL (Levsin/SL) 0.125 mg SL tablet 1 Tab by SubLINGual route every four (4) hours as needed for Cramping. Qty: 30 Tab, Refills: 1      traZODone (DESYREL) 100 mg tablet TAKE 1 TABLET BY MOUTH AT BEDTIME  Qty: 90 Tab, Refills: 1      LORazepam (ATIVAN) 0.5 mg tablet Take 1 Tab by mouth every eight (8) hours as needed for Anxiety. Max Daily Amount: 1.5 mg.  Qty: 30 Tab, Refills: 5    Associated Diagnoses: Anxiety      omeprazole (PRILOSEC) 40 mg capsule Take 1 Cap by mouth daily. Qty: 90 Cap, Refills: 3      metoprolol tartrate (LOPRESSOR) 100 mg IR tablet Take 100 mg by mouth nightly. losartan (COZAAR) 25 mg tablet Take 25 mg by mouth nightly. nystatin (MYCOSTATIN) 100,000 unit/mL suspension Take 5 mL by mouth four (4) times daily. swish and spit  Qty: 60 mL, Refills: 2    Associated Diagnoses: Ulcer aphthous oral      levETIRAcetam 1,000 mg tablet Take 1/2 of tablet every 12 hours  Qty: 90 Tab, Refills: 1      multivitamin (ONE A DAY) tablet Take 1 Tab by mouth daily. polyethylene glycol (MIRALAX) 17 gram/dose powder Take 17 g by mouth daily. Qty: 850 g, Refills: 11         STOP taking these medications       oxyCODONE IR (ROXICODONE) 5 mg immediate release tablet Comments:   Reason for Stopping: Follow Up Orders:  No orders of the defined types were placed in this encounter. Follow-up Information     Follow up With Specialties Details Why Contact Info    Jersey Sears MD Family Medicine  As needed [address] [place] [address] [address] [facility]  [phone]            Time spent in patient discharge planning and coordination 40 minutes.     Signed:  Jose Weber MD

## 2020-07-27 NOTE — PROGRESS NOTES
Problem: Mobility Impaired (Adult and Pediatric)  Goal: *Acute Goals and Plan of Care (Insert Text)  STG's 1-3 MET 7/24/20 :  (1.)Mr. Sylvia Adame will move from supine to sit and sit to supine  in bed with MINIMAL ASSIST. Met 7/19  (2.)Mr. Sylvia Adame will transfer from bed to chair and chair to bed with MINIMAL ASSIST using the least restrictive device. Met 7/24  (3.)Mr. Sylvia Adame will ambulate with MINIMAL ASSIST for 25 feet with the least restrictive device. Met 7/24    RE-EVALUATION / GOALS RE-ASSESSED 7/24/20 :   1) Bd mobility with HOB 20 deg & rail with SBA. 2) Transfers with walker & CGA. 3) pt ambulating  ft with rolling walker & CGA.  (4)Mr. Sylvia Adame will increase right knee ROM to 5°-80° and perform HEP with cues and assistance. .          ________________________________________________________________________________________________      PHYSICAL THERAPY: Daily Note and AM 7/27/2020  INPATIENT: PT Visit Days : 4  Payor: [place] OF [place]ICARE / Plan: [employer] OF [place] MED[place]/PPO / Product Type: Managed Care Medicare /       NAME/AGE/GENDER: Jorge Proctor is a 80 y.o. male   PRIMARY DIAGNOSIS: Sepsis ([address] [A41.9]  Atrial fibrillation with RVR ([facility] [I48.91] Sepsis ([address] Sepsis ([address]       ICD-10: Treatment Diagnosis:    · Generalized Muscle Weakness (M62.81)  · Other abnormalities of gait and mobility (R26.89)   Precaution/Allergies:  Patient has no known allergies. ASSESSMENT:     Mr. Sylvia Adame has an increase extension lag stiffness with knee buckling on 1st walk. Pt was placed in a KI on the right which allowed for improved stability & gait distance. Pt needs to stay in [place] [address], when not exercising or ambulating, to reduce knee extension lag. Pt still a good candidate for aggressive 9th floor in pt rehab. This section established at most recent assessment   PROBLEM LIST (Impairments causing functional limitations):  1. Decreased Strength  2. Decreased ADL/Functional Activities  3.  Decreased Transfer Abilities  4. Decreased Ambulation Ability/Technique  5. Decreased Balance  6. Increased Pain  7. Decreased Activity Tolerance  8. Increased Fatigue  9. Decreased Flexibility/Joint Mobility  10. Edema/Girth  11. Decreased [place] with Home Exercise Program   INTERVENTIONS PLANNED: (Benefits and precautions of physical therapy have been discussed with the patient.)  1. Balance Exercise  2. Bed Mobility  3. Family Education  4. Gait Training  5. Home Exercise Program (HEP)  6. Range of Motion (ROM)  7. Therapeutic Activites  8. Therapeutic Exercise/Strengthening  9. Transfer Training     TREATMENT PLAN: Frequency/Duration: daily for duration of hospital stay  Rehabilitation Potential For Stated Goals: Good     REHAB RECOMMENDATIONS (at time of discharge pending progress):    Placement:  Rehab in pt 9th floor  Equipment:    to be determined              HISTORY:   History of Present Injury/Illness (Reason for Referral):  Mr. Aminta Kingston is a nice 79 y/o WM sent to the ER from home on 7/15 for confusion. 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 Yesterday a friend thought he was confused and dizzy and sent him to the ER. He was in rapid AFib and met sepsis criteria. CXR negative. Cultures collected. He was started on antibiotics and a Cardizem drip and admitted. Ortho consulted. 7/17: O2 from 2L to 4L. Afebrile overnight. WBCs up a little. He is asking if he can go home today. Overnight nurse changed knee bandage, I reviewed a picture that was taken of the surgical site and there was one circular area of erythema just distal to the knee, lateral side of incision. Had to get Cardizem bolus overnight, back in NSR this morning. Bps good.   Past Medical History/Comorbidities:   Mr. Aminta Kingston  has a past medical history of Acute lumbar radiculopathy, Arthritis, Ascending aorta dilatation ([address] Atrial fibrillation with RVR ([address] Enlarged prostate, Martinez catheter in place, GERD (gastroesophageal reflux disease), High cholesterol, Hypertension, ICH (intracerebral hemorrhage) ([facility] [address] Lumbar stenosis with neurogenic claudication, Other forms of scoliosis, lumbar region, Poor historian, Psychiatric disorder, Seizures ([facility] [address] [facility] Tongue lesion, and Urinary frequency. Mr. Angela Montes  has a past surgical history that includes hx other surgical; hx turp (10/20/11); hx heent (8/2012); hx orthopaedic (Right); and hx other surgical.  Social History/Living Environment:   Home Environment: Private residence  # Steps to Enter: 1(4 ft ramp for dog)  One/Two Story Residence: One story  Living Alone: Yes  Support Systems: Family member(s)  Patient Expects to be Discharged to[de-identified] Skilled nursing facility  Current DME Used/Available at Home: karthikeyan Lincoln  Prior Level of Function/Work/Activity:  Using RW after tka     Number of Personal Factors/Comorbidities that affect the Plan of Care: 3+: HIGH COMPLEXITY   EXAMINATION:   Most Recent Physical Functioning:   Gross Assessment:          RLE PROM  R Knee Flexion: 93  R Knee Extension: -19            Balance:  Sitting: Intact; Without support  Standing: Impaired; With support(walker)  Standing - Static: (fair- with walker)  Standing - Dynamic : (fair- with walker) Bed Mobility:  Supine to Sit: Stand-by assistance  Sit to Supine: (NT)  Scooting: Contact guard assistance       Transfers:  Sit to Stand: Minimum assistance  Stand to Sit: Minimum assistance  Bed to Chair: Minimum assistance(with walker)  Duration: 54 Minutes(extra time to work through activity noted)  Gait:  Right Side Weight Bearing: As tolerated  Speed/Elena: Delayed  Step Length: Left shortened;Right shortened  Stance: Right decreased  Gait Abnormalities: Antalgic;Decreased step clearance  Distance (ft): 100 Feet (ft)(1st walk 20 ft with walker & knee lupe, 2nd walk with KI)  Assistive Device: Walker, rolling  Ambulation - Level of Assistance: Minimal assistance  Interventions: Safety awareness training;Verbal cues      Body Structures Involved:  1. Bones  2. Joints  3. Muscles  4. Ligaments Body Functions Affected:  1. Movement Related Activities and Participation Affected:  1. Mobility   Number of elements that affect the Plan of Care: 3: MODERATE COMPLEXITY   CLINICAL PRESENTATION:   Presentation: Stable and uncomplicated: LOW COMPLEXITY   CLINICAL DECISION MAKIN[address] [place] [id number] AM-PAC 6 Clicks   Basic Mobility Inpatient Short Form  How much difficulty does the patient currently have. .. Unable A Lot A Little None   1. Turning over in bed (including adjusting bedclothes, sheets and blankets)? [] 1   [x] 2   [] 3   [] 4   2. Sitting down on and standing up from a chair with arms ( e.g., wheelchair, bedside commode, etc.)   [] 1   [x] 2   [] 3   [] 4   3. Moving from lying on back to sitting on the side of the bed? [] 1   [x] 2   [] 3   [] 4   How much help from another person does the patient currently need. .. Total A Lot A Little None   4. Moving to and from a bed to a chair (including a wheelchair)? [] 1   [x] 2   [] 3   [] 4   5. Need to walk in hospital room? [x] 1   [] 2   [] 3   [] 4   6. Climbing 3-5 steps with a railing? [x] 1   [] 2   [] 3   [] 4   © , Trustees of [address] [place] [address], under license to [email or web address]. All rights reserved      Score:  Initial: 10 Most Recent: X (Date: -- )    Interpretation of Tool:  Represents activities that are increasingly more difficult (i.e. Bed mobility, Transfers, Gait). Medical Necessity:     · Patient is expected to demonstrate progress in   · strength, range of motion, and balance  ·  to   · decrease assistance required with exercises and functional mobility  · . Reason for Services/Other Comments:  · Patient   · continues to require present interventions due to patient's inability to perform exercises and functional mobility independently  · .    Use of outcome tool(s) and clinical judgement create a POC that gives a: Questionable prediction of patient's progress: MODERATE COMPLEXITY            TREATMENT:   (In addition to Assessment/Re-Assessment sessions the following treatments were rendered)   Pre-treatment Symptoms/Complaints:  Pt reports that pain meds make him confused  Pain: Initial:  Numeric scale  Pain Intensity 1: 3  Pain Location 1: Knee  Pain Orientation 1: Right  Pain Intervention(s) 1: Ambulation/Increased Activity, Cold pack, Exercise  Post Session: 3/10     Therapeutic Activity: (  54 Minutes(extra time to work through activity noted)) : Therapeutic activities including TKA exercises as noted below, bed mobility, transfers, sit<> stand, progressive gait with rolling walker to improve mobility, strength and balance. Required minimal Safety awareness training;Verbal cues to promote static balance in standing. Date:  7/25 Date:  7/26/20 Date:  7/27   Activity/Exercise Parameters Parameters Parameters   Quad / gluteal sets 20 20 20   Ankle pumps 20 20 20   Heel slides 20 20 20   Hip ABD-ADD 20 20 20aa   SAQ's 20 20 20   SLR's 20aa 20 20aa   Chair slides 20 20 20          Date:  7/21 Date:  7/23 Date:   7/24   ACTIVITY/EXERCISE AM PM AM PM AM PM   GROUP THERAPY  []  []  []  []  []  []   Ankle Pumps 20   20 20    Quad Sets 20   20 20    Gluteal Sets 20   20 20    Hip ABd/ADduction 20aa   20 20    Straight Leg Raises 20aa   20 20aa    Knee Slides 20aa   20 20    Short Arc Quads 20    20    Long Arc Quads         Chair Slides     20               B = bilateral; AA = active assistive; A = active; P = passive     Braces/Orthotics/Lines/Etc:   · IV  · arnett catheter  Treatment/Session Assessment:    · Response to Treatment:  Pt needed rest breaks but worked hard with PT .  · Interdisciplinary Collaboration:   o Registered Nurse  · After treatment position/precautions:   o Up in chair  o Bed/Chair-wheels locked  o Call light within reach  o RN notified   · Compliance with Program/Exercises:  Will assess as treatment progresses  · Recommendations/Intent for next treatment session: \"Next visit will focus on advancements to more challenging activities and reduction in assistance provided\".   Total Treatment Duration:  PT Patient Time In/Time Out  Time In: 2167  Time Out: [facility],

## 2020-07-27 NOTE — PROGRESS NOTES
Pt resting in the bed,  No c/o pain at this time  ,  Dressing to right knee clean dry and intact. NV status WNL's    Pt is alert and oriented x's 4,  Bed in low locked position and call light within reach.

## 2020-07-27 NOTE — PROGRESS NOTES
Name: Elif Wilkins MRN: [id number]  : 1934  Age:85 y.o.  male  Admit Date:  7/15/2020 LOS: 6      Hospitalist Progress Note    Elif Wilkins is a 80 y.o. male with medical history significant for major depressive disorder/anxiety, neurogenic bladder with suprapubic catheter, hypertension, GERD who was admitted from home on 7/15 for confusion. Patient recently underwent right total knee arthroplasty on . Patient was found to be confused/dizzy by a friend on  then sent him to the ED. Patient was found to have A. fib RVR on arrival and met sepsis criteria. CXR is negative. She was recollected and patient was started on IV antibiotics. Ortho was consulted. Right TKA incision site appears to be healing well therefore it is unlikely the source of sepsis. There was noted to have some purulence around suprapubic catheter but culture has been negative to date. Patient appeared to develop mild volume overload and started on IV Lasix. He noted to have EMILI with elevation of creatinine on . He had episodes of fever spikes with T-max of 102.3  while on p.o. Keflex/doxycycline. Antibiotics was broadened to IV vancomycin and Zosyn and cultures were repeated. The blood cultures and urine cultures have been negative to date. Chest x-ray did not reveal any acute infiltrate. He has been saturating well on room air and completed a course of abx,      Subjective (20) : Patient is seen and examined at bedside. Afebrile for over > 4 days. Hemodynamically stable. Saturating well on room air. Able to walk out in the [address] with staff this AM.    Patient denies chills, chest pains, shortness of breath, n/v, abdominal pain. ROS: 10 point review of systems is otherwise negative with the exception of the elements mentioned above.     Objective:    Patient Vitals for the past 24 hrs:   Temp Pulse Resp BP SpO2   20 0652 98 °F (36.7 °C) 91  119/69 93 %   20 0350 98.8 °F (37.1 °C) (!) 107 16 122/68 91 %   07/27/20 0020 98.9 °F (37.2 °C) (!) 110 16 115/65 91 %   07/26/20 1945 98.6 °F (37 °C) (!) 120 16 147/74 92 %   07/26/20 1542 98.6 °F (37 °C) 88 16 120/65 90 %   07/26/20 1132 98.4 °F (36.9 °C) 79 16 131/71 91 %     Oxygen Therapy  O2 Sat (%): 93 % (07/27/20 0652)  Pulse via Oximetry: 102 beats per minute (07/23/20 0719)  O2 Device: Room air (07/24/20 0750)  O2 Flow Rate (L/min): 2 l/min (07/23/20 0719)  FIO2 (%): 28 % (07/23/20 0719)    Estimated body mass index is 31.93 kg/m² as calculated from the following:    Height as of this encounter: 6' 1\" (1.854 m). Weight as of this encounter: 109.8 kg (242 lb). Intake/Output Summary (Last 24 hours) at 7/27/2020 8406  Last data filed at 7/26/2020 2351  Gross per 24 hour   Intake 360 ml   Output 2500 ml   Net -2140 ml       *Note that automatically entered I/Os may not be accurate; dependent on patient compliance with collection and accurate  by techs. Physical Exam:   General:     alert, awake, no acute distress. Well nourished. Obese  Head:   normocephalic, atraumatic  Eyes, Ears, nose: PERRL, EOMI. Normal conjunctiva  Neck:    supple, non-tender. Trachea midline. Lungs:   Bibasilar crackles, no wheezing  Cardiac:   RRR, Normal S1 and S2. Abdomen:   Soft, non distended, nontender, +BS,      (+) suprapubic catheter: site appears clean/dry without surrounding erythema   Extremities:   Warm, dry. B/l +1 pitting edema (R>L) with stasis dermatitis skin changes. Cooling brace to right knee. Skin:   No rashes, no jaundice  Neuro:  AAOx3.  No gross focal neurological deficit  Psychiatric:  No anxiety, calm, cooperative    Data Review:  I have reviewed all labs, meds, and studies from the last 24 hours:    Recent Results (from the past 24 hour(s))   CBC WITH AUTOMATED DIFF    Collection Time: 07/27/20  3:30 AM   Result Value Ref Range    WBC 13.7 (H) 4.3 - 11.1 K/uL    RBC 3.13 (L) 4.23 - 5.6 M/uL    HGB 9.4 (L) 13.6 - 17.2 g/dL    HCT 30.1 (L) 41.1 - 50.3 %    MCV 96.2 79.6 - 97.8 FL    MCH 30.0 26.1 - 32.9 PG    MCHC 31.2 (L) 31.4 - 35.0 g/dL    RDW 13.8 11.9 - 14.6 %    PLATELET 290 319 - 773 K/uL    MPV 8.5 (L) 9.4 - 12.3 FL    ABSOLUTE NRBC 0.00 0.0 - 0.2 K/uL    DF AUTOMATED      NEUTROPHILS 75 43 - 78 %    LYMPHOCYTES 12 (L) 13 - 44 %    MONOCYTES 9 4.0 - 12.0 %    EOSINOPHILS 2 0.5 - 7.8 %    BASOPHILS 1 0.0 - 2.0 %    IMMATURE GRANULOCYTES 1 0.0 - 5.0 %    ABS. NEUTROPHILS 10.3 (H) 1.7 - 8.2 K/UL    ABS. LYMPHOCYTES 1.6 0.5 - 4.6 K/UL    ABS. MONOCYTES 1.3 0.1 - 1.3 K/UL    ABS. EOSINOPHILS 0.3 0.0 - 0.8 K/UL    ABS. BASOPHILS 0.1 0.0 - 0.2 K/UL    ABS. IMM.  GRANS. 0.1 0.0 - 0.5 K/UL   METABOLIC PANEL, BASIC    Collection Time: 07/27/20  3:30 AM   Result Value Ref Range    Sodium 138 136 - 145 mmol/L    Potassium 3.9 3.5 - 5.1 mmol/L    Chloride 103 98 - 107 mmol/L    CO2 27 21 - 32 mmol/L    Anion gap 8 7 - 16 mmol/L    Glucose 113 (H) 65 - 100 mg/dL    BUN 15 8 - 23 MG/DL    Creatinine 1.01 0.8 - 1.5 MG/DL    GFR est AA >60 >60 ml/min/1.73m2    GFR est non-AA >60 >60 ml/min/1.73m2    Calcium 8.9 8.3 - 10.4 MG/DL        All Micro Results     Procedure Component Value Units Date/Time    CULTURE, BLOOD [[id number]] Collected:  07/21/20 1724    Order Status:  Completed Specimen:  Blood Updated:  07/26/20 0770     Special Requests: --        RIGHT  Antecubital       Culture result: NO GROWTH 5 DAYS       CULTURE, BLOOD [[id number]] Collected:  07/21/20 1742    Order Status:  Completed Specimen:  Blood Updated:  07/26/20 0725     Special Requests: --        RIGHT  HAND       Culture result: NO GROWTH 5 DAYS       CULTURE, BLOOD [[id number]] Collected:  07/15/20 2249    Order Status:  Completed Specimen:  Blood Updated:  07/20/20 0726     Special Requests: --        RIGHT  Antecubital       Culture result: NO GROWTH 5 DAYS       CULTURE, BLOOD [[id number]] Collected:  07/15/20 2334    Order Status:  Completed Specimen:  Blood Updated:  07/20/20 0726     Special Requests: --        RIGHT  HAND       Culture result: NO GROWTH 5 DAYS       CULTURE, Daphene Escort STAIN [[id number]] Collected:  07/16/20 1213    Order Status:  Completed Specimen:  Wound from Skin Updated:  07/19/20 0730     Special Requests: SUPRAPUBIC     GRAM STAIN 1 TO 5 WBCS SEEN PER OIF      NO DEFINITE ORGANISM SEEN        Culture result: NO GROWTH 2 DAYS       CULTURE, URINE [[id number]] Collected:  07/15/20 2321    Order Status:  Completed Specimen:  Urine Updated:  07/18/20 0837     Special Requests: NO SPECIAL REQUESTS        Culture result:       <10,000 COLONIES/mL MIXED SKIN MOE ISOLATED                Current Meds:  Current Facility-Administered Medications   Medication Dose Route Frequency    bisacodyL (DULCOLAX) tablet 5 mg  5 mg Oral DAILY    piperacillin-tazobactam (ZOSYN) 3.375 g in 0.9% sodium chloride (MBP/ADV) 100 mL  3.375 g IntraVENous Q8H    bisacodyL (DULCOLAX) suppository 10 mg  10 mg Rectal DAILY PRN    acetaminophen (TYLENOL) tablet 1,000 mg  1,000 mg Oral Q6H PRN    sodium chloride (NS) flush 5-40 mL  5-40 mL IntraVENous Q8H    sodium chloride (NS) flush 5-40 mL  5-40 mL IntraVENous PRN    HYDROcodone-acetaminophen (NORCO) 5-325 mg per tablet 1 Tab  1 Tab Oral Q4H PRN    naloxone (NARCAN) injection 0.4 mg  0.4 mg IntraVENous PRN    ondansetron (ZOFRAN) injection 4 mg  4 mg IntraVENous Q4H PRN    senna-docusate (PERICOLACE) 8.6-50 mg per tablet 2 Tab  2 Tab Oral DAILY PRN    LORazepam (ATIVAN) tablet 0.5 mg  0.5 mg Oral Q4H PRN    aspirin delayed-release tablet 81 mg  81 mg Oral BID    citalopram (CELEXA) tablet 20 mg  20 mg Oral DAILY    [Held by provider] losartan (COZAAR) tablet 25 mg  25 mg Oral QHS    metoprolol succinate (TOPROL-XL) XL tablet 100 mg  100 mg Oral QHS    pantoprazole (PROTONIX) tablet 40 mg  40 mg Oral ACB    oxyCODONE IR (ROXICODONE) tablet 5 mg  5 mg Oral Q4H PRN    polyethylene glycol (MIRALAX) packet 17 g 17 g Oral DAILY    senna-docusate (PERICOLACE) 8.6-50 mg per tablet 1 Tab  1 Tab Oral DAILY    traZODone (DESYREL) tablet 100 mg  100 mg Oral QHS PRN    metoprolol (LOPRESSOR) injection 5 mg  5 mg IntraVENous Q4H PRN         Other Studies:  Xr Chest Port    Result Date: 7/15/2020  EXAM: Chest x-ray. INDICATION: Dyspnea. COMPARISON: None. TECHNIQUE: Single frontal view. FINDINGS: The lungs are clear. The heart is enlarged. Mediastinal contour and pulmonary vasculature are within normal limits. No pneumothorax or pleural effusion is seen. IMPRESSION: 1. No acute process. 2. Cardiomegaly. Xr Knee Rt 3 V    Result Date: 7/16/2020  Portable right knee  7/16/2020 1:20 PM Indication: Patient immediately status post TKA Comparison: None available at this hospital PACS Findings: Portable AP, oblique and crosstable lateral views from 1246 are submitted. There is an anteriorly located vertically oriented skin staple line. There is soft tissues swelling and trace collections of air in the soft tissues and joints. No unexpected radiopaque foreign body. TKA prosthesis components appear in appropriate alignment with no acute complicating features. No bony fracture. Impression: Expected immediate postop TKA findings. No acute complicating features. Duplex Lower Ext Venous Right    Result Date: 7/16/2020  RIGHT LOWER EXTREMITY DEEP VENOUS SONOGRAPHY: CLINICAL HISTORY: Leg pain and swelling after recent right knee surgery. FINDINGS: Multiple images from real time ultrasound evaluation of the deep venous system of the right leg demonstrate normal venous flow in the posterior tibial, popliteal, and superficial and common femoral veins. Normal compressibility was demonstrated. Flow was also documented in the proximal saphenous and deep femoral veins. No intraluminal echogenic material was seen to suggest the presence of nonobstructive thrombus.      IMPRESSION: NO ULTRASOUND EVIDENCE OF DEEP VENOUS THROMBOSIS IN THE RIGHT LEG. Assessment and Plan: Active Hospital Problems    Diagnosis Date Noted    EMILI (acute kidney injury) ([facility] 07/20/2020    Atrial fibrillation with RVR ([facility] 07/16/2020    S[facility] 07/16/2020    Neurogenic bladder 07/16/2020    GERD (gastroesophageal reflux disease)      controlled w/med      Hypertension      Plan:    Sepsis likely from suprapubic cath site   All cultures have been neg to date. Febrile episode with Tmax of 102.3 on 7/22 overnight while on PO keflex/doxy since 7/18. Continues to have leukocytosis, febrile episode, elevated heart rate meeting SIRS criteria for sepsis of unknown etiology. Repeat UA on 7/21 is negative for UTI. Repeat CXR (7/22) without any new consolidation/infiltrates. Repeat blood culture(7/21) neg to date  - s/p 3 days of vancomycin(7/22-7/24), doxy/keflex(7/28-7/21)  - continue with zosyn for now (7/22 - 7/26)    Afib RVR (resolved)  Likely due to acute illness/sepsis. Now sinus rhythm with possible PVC or aberrant complexes  - continue with metoprolol XL 100mg qHS  - no AC for now given recent procedure     MDD/Anxiety  - continue with celexa, PRN ativan. - per notes, he had suicidal ideation a few nights ago but he has denied any SI to me and during psych consult    Hypertension: continue with home medications  GERD: continue with PPI    Hypoxia likely due to underlying sepsis and volume overload(resolved)  EMILI(resolved)      Diet:  DIET REGULAR  DVT PPx: SCDs  Code: Full Code  Dispo: 9th floor pending insurance approval  Estimated Discharge:  Pending insurance approval for 9th floor    Labs/Imaging Reviewed. Patient is moderate risk due to current condition and comorbid conditions as well as requiring frequent monitoring. Plan discussed with staff, patient/family and are in agreement. Time Spent: Greater than 45 minutes was spent in reviewing charts, physical exam, discussion with patient, and answered any questions.     Signed By: Tracy Bagley Beata Jacobs MD     July 27, 2020

## 2020-07-27 NOTE — PROGRESS NOTES
Problem: Self Care Deficits Care Plan (Adult)  Goal: *Acute Goals and Plan of Care (Insert Text)  Description: GOALS:   DISCHARGE GOALS (in preparation for going home/rehab):  3 days  1. Mr. Yuliet Hernandez will perform one lower body dressing activity with modeate assistance required to demonstrate improved functional mobility and safety. 2.  Mr. Yuliet Hernandez will perform one lower body bathing activity with minimal assistance required to demonstrate improved functional mobility and safety. 3.  Mr. Yuliet Hernandez will perform toileting/toilet transfer with contact guard assistance to demonstrate improved functional mobility and safety. 4.  Mr. Yuliet Hernandez will perform shower transfer with contact guard assistance to demonstrate improved functional mobility and safety. JOINT CAMP OCCUPATIONAL THERAPY TKA: Daily Note 7/27/2020  INPATIENT: Daily note:4  Payor: [facility] [place] MEDICARE / Plan: [place] [facility] MEDICARE HMO/PPO / Product Type: Managed Care Medicare /      NAME/AGE/GENDER: Rolando Kwong is a 80 y.o. male   PRIMARY DIAGNOSIS:  * No surgery found *   * Surgery not found * (Cannot find OR record)  ICD-10: Treatment Diagnosis:    · Pain in Right Knee (M25.561)  · Stiffness of Right Knee, Not elsewhere classified (Q94.603)      ASSESSMENT:     Mr. Yuliet Hernandez is s/p Right TKA last week and returned and presents with decreased weight bearing on R LE and decreased independence with functional mobility and activities of daily living as compared to baseline level of function and safety. Max assist into stance. Normally patient lives alone and has help from friend at times for self cares. Walks with rollator and limited mobility prior to surgery. High pain. Fatigues quickly. 7/23/2020 Able to shave sitting up in bed with moderate assist, patient a little ataxic. Stood from raised bed and transferred to recliner with max assist. Will be a long recovery.      7/24/2020 Pt was sitting in chair upon arrival. Pt completed functional mobility with min A using rolling walker. Pt stood at sink and completed grooming with set up. Pt is progressing towards goals. Continue POC.      7/27/2020 Patient assisted a little for LB dressing for ice and KI but still max assist. Stood and transfers to chair with mod/max assist. Bed mobility mod assist. Patient moving slightly better than last week. Really Needs intensive rehab to get better as his knee is stiffening up. This section established at most recent assessment   PROBLEM LIST (Impairments causing functional limitations):  1. Decreased Strength  2. Decreased ADL/Functional Activities  3. Decreased Transfer Abilities  4. Increased Pain  5. Increased Fatigue  6. Decreased Flexibility/Joint Mobility  7. Decreased Knowledge of Precautions   INTERVENTIONS PLANNED: (Benefits and precautions of occupational therapy have been discussed with the patient.)  1. Activities of daily living training  2. Adaptive equipment training  3. Balance training  4. Clothing management  5. Donning&doffing training  6. Therapeutic activity  7. Therapeutic exercise     TREATMENT PLAN: Frequency/Duration: Follow patient 4x a week to address above goals. Rehabilitation Potential For Stated Goals: Good     RECOMMENDED REHABILITATION/EQUIPMENT: (at time of discharge pending progress): Continue Skilled Therapy. OCCUPATIONAL PROFILE AND HISTORY:   History of Present Injury/Illness (Reason for Referral):   Pt presents this date s/p (Right) TKA 7/13  Past Medical History/Comorbidities:   Mr. Fiorella Barakat  has a past medical history of Acute lumbar radiculopathy, Arthritis, Ascending aorta dilatation ([address] Atrial fibrillation with RVR ([address] Enlarged prostate, Martinez catheter in place, GERD (gastroesophageal reflux disease), High cholesterol, Hypertension, ICH (intracerebral hemorrhage) ([address] Lumbar stenosis with neurogenic claudication, Other forms of scoliosis, lumbar region, Poor historian, Psychiatric disorder, Seizures ([facility] Tongue lesion, and Urinary frequency. Mr. Ruth Bro  has a past surgical history that includes hx other surgical; hx turp (10/20/11); hx heent (8/2012); hx orthopaedic (Right); and hx other surgical.  Social History/Living Environment:   Home Environment: Private residence  # Steps to Enter: 1(4 ft ramp for dog)  One/Two Story Residence: One story  Living Alone: Yes  Support Systems: Family member(s)  Patient Expects to be Discharged to[de-identified] Skilled nursing facility  Current DME Used/Available at Home: Bhargav Vishal, rollator  Prior Level of Function/Work/Activity:  Uses rollator. Assist with bathing and IADL's. Number of Personal Factors/Comorbidities that affect the Plan of Care: Expanded review of therapy/medical records (1-2):  MODERATE COMPLEXITY   ASSESSMENT OF OCCUPATIONAL PERFORMANCE[de-identified]   Most Recent Physical Functioning:   Balance  Sitting: Intact  Standing: With support  Standing - Static: (fair- with walker)  Standing - Dynamic : (fair- with walker)                  LLE Assessment  LLE Assessment (WDL): Within defined limits Coordination  Fine Motor Skills-Upper: Left Intact; Right Intact  Gross Motor Skills-Upper: Left Intact; Right Intact         Mental Status  Neurologic State: Alert  Orientation Level: Oriented X4  Cognition: Appropriate decision making          RLE Assessment  RLE Assessment (WDL): Exceptions to WDL  RLE PROM  R Knee Flexion: 93  R Knee Extension: -19     Basic ADLs (From Assessment) Complex ADLs (From Assessment)   Basic ADL  Feeding: Independent  Oral Facial Hygiene/Grooming: Setup  Bathing: Minimum assistance  Type of Bath: Chlorhexidine (CHG), Bath Pack  Upper Body Dressing: Stand-by assistance  Lower Body Dressing: Moderate assistance  Toileting: Moderate assistance     Grooming/Bathing/Dressing Activities of Daily Living                             Bed/Mat Mobility  Supine to Sit: Stand-by assistance  Sit to Supine: (NT)  Sit to Stand:  Moderate assistance;Maximum assistance  Stand to Sit: Moderate assistance  Bed to Chair: Maximum assistance; Moderate assistance  Scooting: Contact guard assistance         Physical Skills Involved:  1. Range of Motion  2. Balance  3. Strength  4. Activity Tolerance  5. Fine Motor Control  6. Gross Motor Control  7. Pain (acute) Cognitive Skills Affected (resulting in the inability to perform in a timely and safe manner):  1. [facility] Psychosocial Skills Affected:  1. Habits/Routines  2. Environmental Adaptation  3. Social Interaction   Number of elements that affect the Plan of Care: 3-5:  MODERATE COMPLEXITY   CLINICAL DECISION MAKIN[address] [place] [address] AM-PAC 6 Clicks   Daily Activity Inpatient Short Form  How much help from another person does the patient currently need. .. Total A Lot A Little None   1. Putting on and taking off regular lower body clothing? [x] 1   [] 2   [] 3   [] 4   2. Bathing (including washing, rinsing, drying)? [x] 1   [] 2   [] 3   [] 4   3. Toileting, which includes using toilet, bedpan or urinal?   [x] 1   [] 2   [] 3   [] 4   4. Putting on and taking off regular upper body clothing? [] 1   [] 2   [x] 3   [] 4   5. Taking care of personal grooming such as brushing teeth? [] 1   [] 2   [x] 3   [] 4   6. Eating meals? [] 1   [] 2   [x] 3   [] 4   © , Trustees of [address] [place] [address], under license to [employer]. All rights reserved     Score:  Initial: 12 Most Recent: X (Date: -- )    Interpretation of Tool:  Represents activities that are increasingly more difficult (i.e. Bed mobility, Transfers, Gait). Medical Necessity:     · Skilled intervention continues to be required due to Deficits listed above. Reason for Services/Other Comments:  · Patient continues to require skilled intervention due to   · Dx above  · .    Use of outcome tool(s) and clinical judgement create a POC that gives a: MODERATE COMPLEXITY            TREATMENT:   (In addition to Assessment/Re-Assessment sessions the following treatments were rendered)     Pre-treatment Symptoms/Complaints:    Pain: Initial:   Pain Intensity 1: 3  Pain Location 1: Knee  Pain Orientation 1: Right  Post Session:  7     Self Care: (25): Procedure(s) (per grid) utilized to improve and/or restore self-care/home management as related to dressing, bathing and grooming. Required maximal  verbal and tactile cueing to facilitate activities of daily living skills and compensatory activities    Treatment/Session Assessment:     Response to Treatment:  Good, up in recliner. Education:  [] Home Exercises  [x] Fall Precautions  [] Hip Precautions [] Going Home Video  [x] Knee/Hip Prosthesis Review  [x] Walker Management/Safety [x] Adaptive Equipment as Needed       Interdisciplinary Collaboration:   o Physical Therapist  o Certified Occupational Therapy Assistant  o Registered Nurse    After treatment position/precautions:   o Up in chair  o Bed/Chair-wheels locked  o Caregiver at bedside  o Call light within reach  o RN notified     Compliance with Program/Exercises: Compliant all of the time, Will assess as treatment progresses. Recommendations/Intent for next treatment session:  Treatment next visit will focus on increasing Mr. Farhad Gillette independence with bed mobility, transfers, self care, functional mobility, modalities for pain, and patient education.       Total Treatment Duration:  OT Patient Time In/Time Out  Time In: 0603  Time Out: [place], [place]

## 2020-07-28 LAB
ERYTHROCYTE [DISTWIDTH] IN BLOOD BY AUTOMATED COUNT: 13.7 % (ref 11.9–14.6)
HCT VFR BLD AUTO: 30.9 % (ref 41.1–50.3)
HGB BLD-MCNC: 9.7 G/DL (ref 13.6–17.2)
MCH RBC QN AUTO: 31.1 PG (ref 26.1–32.9)
MCHC RBC AUTO-ENTMCNC: 31.4 G/DL (ref 31.4–35)
MCV RBC AUTO: 99 FL (ref 79.6–97.8)
NRBC # BLD: 0 K/UL (ref 0–0.2)
PLATELET # BLD AUTO: 435 K/UL (ref 150–450)
PMV BLD AUTO: 8.5 FL (ref 9.4–12.3)
RBC # BLD AUTO: 3.12 M/UL (ref 4.23–5.6)
WBC # BLD AUTO: 14.5 K/UL (ref 4.3–11.1)

## 2020-07-28 PROCEDURE — 65270000029 HC RM PRIVATE

## 2020-07-28 PROCEDURE — 85027 COMPLETE CBC AUTOMATED: CPT

## 2020-07-28 PROCEDURE — 3331090001 HH PPS REVENUE CREDIT

## 2020-07-28 PROCEDURE — 97530 THERAPEUTIC ACTIVITIES: CPT

## 2020-07-28 PROCEDURE — 36415 COLL VENOUS BLD VENIPUNCTURE: CPT

## 2020-07-28 PROCEDURE — 3331090002 HH PPS REVENUE DEBIT

## 2020-07-28 PROCEDURE — 97535 SELF CARE MNGMENT TRAINING: CPT

## 2020-07-28 PROCEDURE — 74011250637 HC RX REV CODE- 250/637: Performed by: INTERNAL MEDICINE

## 2020-07-28 RX ADMIN — METOPROLOL SUCCINATE 100 MG: 50 TABLET, EXTENDED RELEASE ORAL at 20:05

## 2020-07-28 RX ADMIN — LORAZEPAM 0.5 MG: 0.5 TABLET ORAL at 13:42

## 2020-07-28 RX ADMIN — CITALOPRAM HYDROBROMIDE 20 MG: 20 TABLET ORAL at 09:15

## 2020-07-28 RX ADMIN — ASPIRIN 81 MG: 81 TABLET, COATED ORAL at 17:30

## 2020-07-28 RX ADMIN — ASPIRIN 81 MG: 81 TABLET, COATED ORAL at 09:15

## 2020-07-28 RX ADMIN — LORAZEPAM 0.5 MG: 0.5 TABLET ORAL at 17:30

## 2020-07-28 NOTE — PROGRESS NOTES
Pt resting quietly in the bed  Ativan 0.5 mg given PO, for \"nerves\". Right knee with clean dry and intact  steri strips over the  Incision,  NV status WNL's  Bed is in low locked position and call light within reach.   Pt denies c/o pain

## 2020-07-28 NOTE — PROGRESS NOTES
Problem: Mobility Impaired (Adult and Pediatric)  Goal: *Acute Goals and Plan of Care (Insert Text)  STG's 1-3 MET 7/24/20 :  (1.)Mr. Tracee Jensen will move from supine to sit and sit to supine  in bed with MINIMAL ASSIST. Met 7/19  (2.)Mr. Tracee Jensen will transfer from bed to chair and chair to bed with MINIMAL ASSIST using the least restrictive device. Met 7/24  (3.)Mr. Tracee Jensen will ambulate with MINIMAL ASSIST for 25 feet with the least restrictive device. Met 7/24    RE-EVALUATION / GOALS RE-ASSESSED 7/24/20 :   1) Bd mobility with HOB 20 deg & rail with SBA. 2) Transfers with walker & CGA. 3) pt ambulating  ft with rolling walker & CGA.  (4)Mr. Tracee Jensen will increase right knee ROM to 5°-80° and perform HEP with cues and assistance. .          ________________________________________________________________________________________________      PHYSICAL THERAPY: Daily Note and AM 7/28/2020  INPATIENT: PT Visit Days : 5  Payor: [employer] [facility] [place] / Plan: [facility] [place] [place]/PPO / Product Type: Managed Care Medicare /       NAME/AGE/GENDER: Shmuel Rowland is a 80 y.o. male   PRIMARY DIAGNOSIS: Sepsis ([address] [A41.9]  Atrial fibrillation with RVR ([facility] [I48.91] Sepsis ([address] Sepsis ([address]       ICD-10: Treatment Diagnosis:    · Generalized Muscle Weakness (M62.81)  · Other abnormalities of gait and mobility (R26.89)   Precaution/Allergies:  Patient has no known allergies. ASSESSMENT:     Mr. Tracee Jensen is up in the chair with no complaints. He is agreeable to mobilize. He needs assistance with all transfers and gait and is not safe on his feet without assistance. He has a knee immobilizer on his right knee to facilitate knee extension. Gait is slow and fatigue limited distance this am.  He did tka exercises with cues and assistance. He was able to do a straight leg raise after needing help initially with exercises. Quad set limited. Extension rom better. No questions. Needs rehab.     This section established at most recent assessment   PROBLEM LIST (Impairments causing functional limitations):  1. Decreased Strength  2. Decreased ADL/Functional Activities  3. Decreased Transfer Abilities  4. Decreased Ambulation Ability/Technique  5. Decreased Balance  6. Increased Pain  7. Decreased Activity Tolerance  8. Increased Fatigue  9. Decreased Flexibility/Joint Mobility  10. Edema/Girth  11. Decreased [place] with Home Exercise Program   INTERVENTIONS PLANNED: (Benefits and precautions of physical therapy have been discussed with the patient.)  1. Balance Exercise  2. Bed Mobility  3. Family Education  4. Gait Training  5. Home Exercise Program (HEP)  6. Range of Motion (ROM)  7. Therapeutic Activites  8. Therapeutic Exercise/Strengthening  9. Transfer Training     TREATMENT PLAN: Frequency/Duration: daily for duration of hospital stay  Rehabilitation Potential For Stated Goals: Good     REHAB RECOMMENDATIONS (at time of discharge pending progress):    Placement:  Rehab in pt 9th floor  Equipment:    to be determined              HISTORY:   History of Present Injury/Illness (Reason for Referral):  Mr. Yuliet Hernandez is a nice 79 y/o WM sent to the ER from home on 7/15 for confusion. 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 Yesterday a friend thought he was confused and dizzy and sent him to the ER. He was in rapid AFib and met sepsis criteria. CXR negative. Cultures collected. He was started on antibiotics and a Cardizem drip and admitted. Ortho consulted. 7/17: O2 from 2L to 4L. Afebrile overnight. WBCs up a little. He is asking if he can go home today. Overnight nurse changed knee bandage, I reviewed a picture that was taken of the surgical site and there was one circular area of erythema just distal to the knee, lateral side of incision. Had to get Cardizem bolus overnight, back in NSR this morning. Bps good.   Past Medical History/Comorbidities:   Mr. Yuliet Hernandez  has a past medical history of Acute lumbar radiculopathy, Arthritis, Ascending aorta dilatation (HCC), Atrial fibrillation with RVR ([facility] [address] [address] Enlarged prostate, Martinez catheter in place, GERD (gastroesophageal reflux disease), High cholesterol, Hypertension, ICH (intracerebral hemorrhage) ([address] Lumbar stenosis with neurogenic claudication, Other forms of scoliosis, lumbar region, Poor historian, Psychiatric disorder, Seizures ([facility] [address] Tongue lesion, and Urinary frequency. Mr. Azam Sharif  has a past surgical history that includes hx other surgical; hx turp (10/20/11); hx heent (8/2012); hx orthopaedic (Right); and hx other surgical.  Social History/Living Environment:   Home Environment: Private residence  # Steps to Enter: 1(4 ft ramp for dog)  One/Two Story Residence: One story  Living Alone: Yes  Support Systems: Family member(s)  Patient Expects to be Discharged to[de-identified] Skilled nursing facility  Current DME Used/Available at Home: karthikeyan Lee  Prior Level of Function/Work/Activity:  Using RW after tka     Number of Personal Factors/Comorbidities that affect the Plan of Care: 3+: HIGH COMPLEXITY   EXAMINATION:   Most Recent Physical Functioning:   Gross Assessment:  AROM: Generally decreased, functional(except right LE)  Strength: Generally decreased, functional(except right LE)       RLE PROM  R Knee Flexion: 96  R Knee Extension: 9         Posture (WDL): Exceptions to WDL(neck flexed)  Posture Assessment: Decreased, Rounded shoulders, Forward head, Other (comment)  Balance:  Sitting: Intact  Standing: With support Bed Mobility:          Transfers:  Sit to Stand:  Moderate assistance  Stand to Sit: Moderate assistance  Duration: 30 Minutes  Gait:  Right Side Weight Bearing: As tolerated  Speed/Elena: Delayed  Step Length: Left shortened;Right shortened  Stance: Right decreased  Gait Abnormalities: Antalgic;Decreased step clearance  Distance (ft): 25 Feet (ft)  Assistive Device: Walker, rolling  Ambulation - Level of Assistance: Minimal assistance  Interventions: Manual cues;Safety awareness training; Tactile cues; Verbal cues      Body Structures Involved:  1. Bones  2. Joints  3. Muscles  4. Ligaments Body Functions Affected:  1. Movement Related Activities and Participation Affected:  1. Mobility   Number of elements that affect the Plan of Care: 3: MODERATE COMPLEXITY   CLINICAL PRESENTATION:   Presentation: Stable and uncomplicated: LOW COMPLEXITY   CLINICAL DECISION MAKIN[address] [place] [address] AM-PAC 6 Clicks   Basic Mobility Inpatient Short Form  How much difficulty does the patient currently have. .. Unable A Lot A Little None   1. Turning over in bed (including adjusting bedclothes, sheets and blankets)? [] 1   [x] 2   [] 3   [] 4   2. Sitting down on and standing up from a chair with arms ( e.g., wheelchair, bedside commode, etc.)   [] 1   [x] 2   [] 3   [] 4   3. Moving from lying on back to sitting on the side of the bed? [] 1   [x] 2   [] 3   [] 4   How much help from another person does the patient currently need. .. Total A Lot A Little None   4. Moving to and from a bed to a chair (including a wheelchair)? [] 1   [x] 2   [] 3   [] 4   5. Need to walk in hospital room? [x] 1   [] 2   [] 3   [] 4   6. Climbing 3-5 steps with a railing? [x] 1   [] 2   [] 3   [] 4   © , Trustees of [address] [place] [address], under license to [employer]. All rights reserved      Score:  Initial: 10 Most Recent: X (Date: -- )    Interpretation of Tool:  Represents activities that are increasingly more difficult (i.e. Bed mobility, Transfers, Gait). Medical Necessity:     · Patient is expected to demonstrate progress in   · strength, range of motion, and balance  ·  to   · decrease assistance required with exercises and functional mobility  · . Reason for Services/Other Comments:  · Patient   · continues to require present interventions due to patient's inability to perform exercises and functional mobility independently  · .    Use of outcome tool(s) and clinical judgement create a POC that gives a: Questionable prediction of patient's progress: MODERATE COMPLEXITY            TREATMENT:   (In addition to Assessment/Re-Assessment sessions the following treatments were rendered)   Pre-treatment Symptoms/Complaints:  Pt reports fatigue  Pain: Initial:  Numeric scale     Post Session: 3-4     Therapeutic Activity: (  30 Minutes) : Therapeutic activities including TKA exercises as noted below, bed mobility, transfers, sit<> stand, progressive gait with rolling walker and exercises listed below to improve mobility, strength and balance. Required minimal Manual cues; Safety awareness training; Tactile cues; Verbal cues to promote static balance in standing. Date:  7/28 Date:   Date:     Activity/Exercise Parameters Parameters Parameters   Quad / gluteal sets 20     Ankle pumps 20     Heel slides 20     Hip ABD-ADD 20     SAQ's 20aa     SLR's 20aa     Chair slides 20                 ACTIVITY/EXERCISE         GROUP THERAPY         Ankle Pumps         Quad Sets         Gluteal Sets         Hip ABd/ADduction         Straight Leg Raises         Knee Slides         Short Arc Quads         Long Arc Quads         Chair Slides                    B = bilateral; AA = active assistive; A = active; P = passive     Braces/Orthotics/Lines/Etc:   · IV  · arnett catheter  Treatment/Session Assessment:    · Response to Treatment:  Pt did well  · Interdisciplinary Collaboration:   o Registered Nurse  · After treatment position/precautions:   o Up in chair  o Bed/Chair-wheels locked  o Bed in low position  o Call light within reach  o RN notified   · Compliance with Program/Exercises: Will assess as treatment progresses  · Recommendations/Intent for next treatment session: \"Next visit will focus on advancements to more challenging activities and reduction in assistance provided\".   Total Treatment Duration:  PT Patient Time In/Time Out  Time In: 1045  Time Out: [place], PT

## 2020-07-28 NOTE — PROGRESS NOTES
Care Management Interventions  PCP Verified by CM: Yes  Mode of Transport at Discharge: BLS  Transition of Care Consult (CM Consult): SNF(Acute Rehab)  [address]: Yes  Partner SNF: Yes  Physical Therapy Consult: Yes  Occupational Therapy Consult: Yes  Current Support Network: Lives Alone  Confirm Follow Up Transport: Other (see comment)(stretcher)  The Plan for Transition of Care is Related to the Following Treatment Goals : return to independent function  The Patient and/or Patient Representative was Provided with a Choice of Provider and Agrees with the Discharge Plan?: Yes  Freedom of Choice List was Provided with Basic Dialogue that Supports the Patient's Individualized Plan of Care/Goals, Treatment Preferences and Shares the Quality Data Associated with the Providers?: Yes  Discharge Location  Discharge Placement: [address] with 9th floor left message, his insurance has denied our request for admission to 9th floor rehab. We will proceed with [facility] in [place]. Ins precert will need to be restarted with STR in a NH. Pt/ and his sister were updated with plans.

## 2020-07-28 NOTE — PROGRESS NOTES
Problem: Mobility Impaired (Adult and Pediatric)  Goal: *Acute Goals and Plan of Care (Insert Text)  STG's 1-3 MET 7/24/20 :  (1.)Mr. Issa Brown will move from supine to sit and sit to supine  in bed with MINIMAL ASSIST. Met 7/19  (2.)Mr. Issa Brown will transfer from bed to chair and chair to bed with MINIMAL ASSIST using the least restrictive device. Met 7/24  (3.)Mr. Issa Brown will ambulate with MINIMAL ASSIST for 25 feet with the least restrictive device. Met 7/24    RE-EVALUATION / GOALS RE-ASSESSED 7/24/20 :   1) Bd mobility with HOB 20 deg & rail with SBA. 2) Transfers with walker & CGA. 3) pt ambulating  ft with rolling walker & CGA.  (4)Mr. Issa Brown will increase right knee ROM to 5°-80° and perform HEP with cues and assistance. .          ________________________________________________________________________________________________      PHYSICAL THERAPY: Daily Note and PM 7/28/2020  INPATIENT: PT Visit Days : 5  Payor: Tani Pastrana [place] / Plan: Serina Cedeno [place] / Product Type: Managed Care Medicare /       NAME/AGE/GENDER: Timo Johnson is a 80 y.o. male   PRIMARY DIAGNOSIS: Sepsis ([address] [A41.9]  Atrial fibrillation with RVR ([facility] [I48.91] Sepsis ([address] Sepsis ([facility] [address] [facility]       ICD-10: Treatment Diagnosis:    · Generalized Muscle Weakness (M62.81)  · Other abnormalities of gait and mobility (R26.89)   Precaution/Allergies:  Patient has no known allergies. ASSESSMENT:     Mr. Issa Brown is up in the chair with no complaints. He is agreeable to mobilize. He needs assistance with all transfers and gait and is not safe on his feet without assistance. He has a knee immobilizer on his right knee to facilitate knee extension. Gait is slow and fatigue limited distance this am.  He did tka exercises with cues and assistance. He was able to do a straight leg raise after needing help initially with exercises. Quad set limited. Extension rom better. No questions. Needs rehab.  7/28/20 pm - pt.  Still in chair and wanted to lie down. He needed significant assistance to stand up at chair and take a few steps to the bed with RW and return to supine. He needed more assistance to mobilize this afternoon. Left supine without complaints. No questions. This section established at most recent assessment   PROBLEM LIST (Impairments causing functional limitations):  1. Decreased Strength  2. Decreased ADL/Functional Activities  3. Decreased Transfer Abilities  4. Decreased Ambulation Ability/Technique  5. Decreased Balance  6. Increased Pain  7. Decreased Activity Tolerance  8. Increased Fatigue  9. Decreased Flexibility/Joint Mobility  10. Edema/Girth  11. Decreased Caroline with Home Exercise Program   INTERVENTIONS PLANNED: (Benefits and precautions of physical therapy have been discussed with the patient.)  1. Balance Exercise  2. Bed Mobility  3. Family Education  4. Gait Training  5. Home Exercise Program (HEP)  6. Range of Motion (ROM)  7. Therapeutic Activites  8. Therapeutic Exercise/Strengthening  9. Transfer Training     TREATMENT PLAN: Frequency/Duration: daily for duration of hospital stay  Rehabilitation Potential For Stated Goals: Good     REHAB RECOMMENDATIONS (at time of discharge pending progress):    Placement:  Rehab in pt 9th floor  Equipment:    to be determined              HISTORY:   History of Present Injury/Illness (Reason for Referral):  Mr. Cecilio Hoang is a nice 79 y/o WM sent to the ER from home on 7/15 for confusion. 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 Yesterday a friend thought he was confused and dizzy and sent him to the ER. He was in rapid AFib and met sepsis criteria. CXR negative. Cultures collected. He was started on antibiotics and a Cardizem drip and admitted. Ortho consulted. 7/17: O2 from 2L to 4L. Afebrile overnight. WBCs up a little. He is asking if he can go home today.  Overnight nurse changed knee bandage, I reviewed a picture that was taken of the surgical site and there was one circular area of erythema just distal to the knee, lateral side of incision. Had to get Cardizem bolus overnight, back in NSR this morning. Bps good. Past Medical History/Comorbidities:   Mr. Tracee Jensen  has a past medical history of Acute lumbar radiculopathy, Arthritis, Ascending aorta dilatation ([address] Atrial fibrillation with RVR ([address] Enlarged prostate, Martinez catheter in place, GERD (gastroesophageal reflux disease), High cholesterol, Hypertension, ICH (intracerebral hemorrhage) ([address] Lumbar stenosis with neurogenic claudication, Other forms of scoliosis, lumbar region, Poor historian, Psychiatric disorder, Seizures ([address] Tongue lesion, and Urinary frequency. Mr. Tracee Jensen  has a past surgical history that includes hx other surgical; hx turp (10/20/11); hx heent (8/2012); hx orthopaedic (Right); and hx other surgical.  Social History/Living Environment:   Home Environment: Private residence  # Steps to Enter: 1(4 ft ramp for dog)  One/Two Story Residence: One story  Living Alone: Yes  Support Systems: Family member(s)  Patient Expects to be Discharged to[de-identified] Skilled nursing facility  Current DME Used/Available at Home: [address], karthikeyan  Prior Level of Function/Work/Activity:  Using RW after tka     Number of Personal Factors/Comorbidities that affect the Plan of Care: 3+: HIGH COMPLEXITY   EXAMINATION:   Most Recent Physical Functioning:   Gross Assessment:  AROM: Generally decreased, functional(except right LE)  Strength: Generally decreased, functional(except right LE)       RLE PROM  R Knee Flexion: 96  R Knee Extension: 9         Posture (WDL): Exceptions to WDL(neck flexed)  Posture Assessment: Decreased, Rounded shoulders, Forward head, Other (comment)  Balance:  Sitting: Intact  Standing: Pull to stand; With support Bed Mobility:  Sit to Supine:  Moderate assistance       Transfers:  Sit to Stand: Maximum assistance  Stand to Sit: Moderate assistance  Duration: 15 Minutes  Gait:  Right Side Weight Bearing: As tolerated  Speed/Elena: Delayed  Step Length: Left shortened;Right shortened  Stance: Right decreased  Gait Abnormalities: Antalgic;Decreased step clearance  Distance (ft): 3 Feet (ft)  Assistive Device: Walker, rolling  Ambulation - Level of Assistance: Minimal assistance; Moderate assistance  Interventions: Safety awareness training;Verbal cues      Body Structures Involved:  1. Bones  2. Joints  3. Muscles  4. Ligaments Body Functions Affected:  1. Movement Related Activities and Participation Affected:  1. Mobility   Number of elements that affect the Plan of Care: 3: MODERATE COMPLEXITY   CLINICAL PRESENTATION:   Presentation: Stable and uncomplicated: LOW COMPLEXITY   CLINICAL DECISION MAKIN[address] [place] [address] AM-PAC 6 Clicks   Basic Mobility Inpatient Short Form  How much difficulty does the patient currently have. .. Unable A Lot A Little None   1. Turning over in bed (including adjusting bedclothes, sheets and blankets)? [] 1   [x] 2   [] 3   [] 4   2. Sitting down on and standing up from a chair with arms ( e.g., wheelchair, bedside commode, etc.)   [] 1   [x] 2   [] 3   [] 4   3. Moving from lying on back to sitting on the side of the bed? [] 1   [x] 2   [] 3   [] 4   How much help from another person does the patient currently need. .. Total A Lot A Little None   4. Moving to and from a bed to a chair (including a wheelchair)? [] 1   [x] 2   [] 3   [] 4   5. Need to walk in hospital room? [x] 1   [] 2   [] 3   [] 4   6. Climbing 3-5 steps with a railing? [x] 1   [] 2   [] 3   [] 4   © , Trustees of [address] [place] [address], under license to [employer] All rights reserved      Score:  Initial: 10 Most Recent: X (Date: -- )    Interpretation of Tool:  Represents activities that are increasingly more difficult (i.e. Bed mobility, Transfers, Gait).     Medical Necessity:     · Patient is expected to demonstrate progress in   · strength, range of motion, and balance  ·  to · decrease assistance required with exercises and functional mobility  · . Reason for Services/Other Comments:  · Patient   · continues to require present interventions due to patient's inability to perform exercises and functional mobility independently  · . Use of outcome tool(s) and clinical judgement create a POC that gives a: Questionable prediction of patient's progress: MODERATE COMPLEXITY            TREATMENT:   (In addition to Assessment/Re-Assessment sessions the following treatments were rendered)   Pre-treatment Symptoms/Complaints:  Pt reports fatigue  Pain: Initial:  Numeric scale     Post Session: did not rate     Therapeutic Activity: (  15 Minutes) : Therapeutic activities including TKA exercises as noted below, bed mobility, transfers, sit<> stand, progressive gait with rolling walker to bed listed below to improve mobility, strength and balance. Required minimal Safety awareness training;Verbal cues to promote static balance in standing. Date:  7/28 Date:   Date:     Activity/Exercise Parameters Parameters Parameters   Quad / gluteal sets 20     Ankle pumps 20     Heel slides 20     Hip ABD-ADD 20     SAQ's 20aa     SLR's 20aa     Chair slides 20                 ACTIVITY/EXERCISE         GROUP THERAPY         Ankle Pumps         Quad Sets         Gluteal Sets         Hip ABd/ADduction         Straight Leg Raises         Knee Slides         Short Arc Quads         Long Arc Quads         Chair Slides                    B = bilateral; AA = active assistive; A = active; P = passive     Braces/Orthotics/Lines/Etc:   · IV  · arnett catheter  Treatment/Session Assessment:    · Response to Treatment:  Pt needed more assistance this pm  · Interdisciplinary Collaboration:   o Registered Nurse  · After treatment position/precautions:   o Supine in bed  o Bed alarm/tab alert on  o Bed/Chair-wheels locked  o Bed in low position  o Call light within reach   · Compliance with Program/Exercises:  Will assess as treatment progresses  · Recommendations/Intent for next treatment session: \"Next visit will focus on advancements to more challenging activities and reduction in assistance provided\".   Total Treatment Duration:  PT Patient Time In/Time Out  Time In: 1645  Time Out: [address], PT

## 2020-07-28 NOTE — PROGRESS NOTES
Problem: Self Care Deficits Care Plan (Adult)  Goal: *Acute Goals and Plan of Care (Insert Text)  Description: GOALS:   DISCHARGE GOALS (in preparation for going home/rehab):  3 days  1. Mr. Yuliet Hernandez will perform one lower body dressing activity with modeate assistance required to demonstrate improved functional mobility and safety. 2.  Mr. Yuliet Hernandez will perform one lower body bathing activity with minimal assistance required to demonstrate improved functional mobility and safety. 3.  Mr. Yuliet Hernandez will perform toileting/toilet transfer with contact guard assistance to demonstrate improved functional mobility and safety. 4.  Mr. Yuliet Hernandez will perform shower transfer with contact guard assistance to demonstrate improved functional mobility and safety. JOINT CAMP OCCUPATIONAL THERAPY TKA: Daily Note 7/28/2020  INPATIENT: Daily note:4  Payor: [facility] [place] MEDICARE / Plan: [place] [facility] [place] MEDIC[place] HMO/PPO / Product Type: Managed Care Medicare /      NAME/AGE/GENDER: Rolando Kwong is a 80 y.o. male   PRIMARY DIAGNOSIS:  * No surgery found *   * Surgery not found * (Cannot find OR record)  ICD-10: Treatment Diagnosis:    · Pain in Right Knee (M25.561)  · Stiffness of Right Knee, Not elsewhere classified (U45.217)      ASSESSMENT:     Mr. Yuliet Hernandez is s/p Right TKA last week and returned and presents with decreased weight bearing on R LE and decreased independence with functional mobility and activities of daily living as compared to baseline level of function and safety. Max assist into stance. Normally patient lives alone and has help from friend at times for self cares. Walks with rollator and limited mobility prior to surgery. High pain. Fatigues quickly. 7/23/2020 Able to shave sitting up in bed with moderate assist, patient a little ataxic. Stood from raised bed and transferred to recliner with max assist. Will be a long recovery.      7/24/2020 Pt was sitting in chair upon arrival. Pt completed functional mobility with min A using rolling walker. Pt stood at sink and completed grooming with set up. Pt is progressing towards goals. Continue POC.      7/27/2020 Patient assisted a little for LB dressing for ice and KI but still max assist. Stood and transfers to chair with mod/max assist. Bed mobility mod assist. Patient moving slightly better than last week. Really Needs intensive rehab to get better as his knee is stiffening up.       7/28/2020 Patient with increasing ease of transfers during self cares, getting close for shower transfers in near future. Making steady improvement after a hard last week. This section established at most recent assessment   PROBLEM LIST (Impairments causing functional limitations):  1. Decreased Strength  2. Decreased ADL/Functional Activities  3. Decreased Transfer Abilities  4. Increased Pain  5. Increased Fatigue  6. Decreased Flexibility/Joint Mobility  7. Decreased Knowledge of Precautions   INTERVENTIONS PLANNED: (Benefits and precautions of occupational therapy have been discussed with the patient.)  1. Activities of daily living training  2. Adaptive equipment training  3. Balance training  4. Clothing management  5. Donning&doffing training  6. Therapeutic activity  7. Therapeutic exercise     TREATMENT PLAN: Frequency/Duration: Follow patient 4x a week to address above goals. Rehabilitation Potential For Stated Goals: Good     RECOMMENDED REHABILITATION/EQUIPMENT: (at time of discharge pending progress): Continue Skilled Therapy. OCCUPATIONAL PROFILE AND HISTORY:   History of Present Injury/Illness (Reason for Referral):   Pt presents this date s/p (Right) TKA 7/13  Past Medical History/Comorbidities:   Mr. Armstrong Erps  has a past medical history of Acute lumbar radiculopathy, Arthritis, Ascending aorta dilatation ([address] Atrial fibrillation with RVR ([address] Enlarged prostate, Martinez catheter in place, GERD (gastroesophageal reflux disease), High cholesterol, Hypertension, ICH (intracerebral hemorrhage) ([facility] Lumbar stenosis with neurogenic claudication, Other forms of scoliosis, lumbar region, Poor historian, Psychiatric disorder, Seizures ([facility] [address] Tongue lesion, and Urinary frequency. Mr. Ruth Bro  has a past surgical history that includes hx other surgical; hx turp (10/20/11); hx heent (8/2012); hx orthopaedic (Right); and hx other surgical.  Social History/Living Environment:   Home Environment: Private residence  # Steps to Enter: 1(4 ft ramp for dog)  One/Two Story Residence: One story  Living Alone: Yes  Support Systems: Family member(s)  Patient Expects to be Discharged to[de-identified] Skilled nursing facility  Current DME Used/Available at Home: karthikeyan Iqbal  Prior Level of Function/Work/Activity:  Uses rollator. Assist with bathing and IADL's. Number of Personal Factors/Comorbidities that affect the Plan of Care: Expanded review of therapy/medical records (1-2):  MODERATE COMPLEXITY   ASSESSMENT OF OCCUPATIONAL PERFORMANCE[de-identified]   Most Recent Physical Functioning:   Balance  Sitting: Intact  Standing: With support                                               Basic ADLs (From Assessment) Complex ADLs (From Assessment)   Basic ADL  Feeding: Independent  Oral Facial Hygiene/Grooming: Setup  Bathing: Minimum assistance  Type of Bath: Chlorhexidine (CHG), Bath Pack  Upper Body Dressing: Stand-by assistance  Lower Body Dressing: Moderate assistance  Toileting: Moderate assistance     Grooming/Bathing/Dressing Activities of Daily Living                             Bed/Mat Mobility  Sit to Stand: Minimum assistance  Stand to Sit: Moderate assistance  Bed to Chair: Minimum assistance; Moderate assistance         Physical Skills Involved:  1. Range of Motion  2. Balance  3. Strength  4. Activity Tolerance  5. Fine Motor Control  6. Gross Motor Control  7. Pain (acute) Cognitive Skills Affected (resulting in the inability to perform in a timely and safe manner):  1.  WFL Psychosocial Skills Affected:  1. Habits/Routines  2. Environmental Adaptation  3. Social Interaction   Number of elements that affect the Plan of Care: 3-5:  MODERATE COMPLEXITY   CLINICAL DECISION MAKIN[address] [place] [address] AM-PAC 6 Clicks   Daily Activity Inpatient Short Form  How much help from another person does the patient currently need. .. Total A Lot A Little None   1. Putting on and taking off regular lower body clothing? [x] 1   [] 2   [] 3   [] 4   2. Bathing (including washing, rinsing, drying)? [x] 1   [] 2   [] 3   [] 4   3. Toileting, which includes using toilet, bedpan or urinal?   [x] 1   [] 2   [] 3   [] 4   4. Putting on and taking off regular upper body clothing? [] 1   [] 2   [x] 3   [] 4   5. Taking care of personal grooming such as brushing teeth? [] 1   [] 2   [x] 3   [] 4   6. Eating meals? [] 1   [] 2   [x] 3   [] 4   © , Trustees of [address] [place] [address], under license to [employer]. All rights reserved     Score:  Initial: 12 Most Recent: X (Date: -- )    Interpretation of Tool:  Represents activities that are increasingly more difficult (i.e. Bed mobility, Transfers, Gait). Medical Necessity:     · Skilled intervention continues to be required due to Deficits listed above. Reason for Services/Other Comments:  · Patient continues to require skilled intervention due to   · Dx above  · . Use of outcome tool(s) and clinical judgement create a POC that gives a: MODERATE COMPLEXITY            TREATMENT:   (In addition to Assessment/Re-Assessment sessions the following treatments were rendered)     Pre-treatment Symptoms/Complaints:    Pain: Initial:   Pain Intensity 1: 3  Pain Location 1: Knee  Pain Orientation 1: Right  Post Session:  7     Self Care: (15): Procedure(s) (per grid) utilized to improve and/or restore self-care/home management as related to dressing, bathing and grooming.  Required maximal  verbal and tactile cueing to facilitate activities of daily living skills and compensatory activities   Therapeutic Activity (  15): Therapeutic activities per grid below to improve mobility, strength and balance. Required minimal verbal and tactile cues to promote motor control of bilateral, upper extremity(s), lower extremity(s). Treatment/Session Assessment:     Response to Treatment:  Good, up in recliner. Education:  [] Home Exercises  [x] Fall Precautions  [] Hip Precautions [] Going Home Video  [x] Knee/Hip Prosthesis Review  [x] Walker Management/Safety [x] Adaptive Equipment as Needed       Interdisciplinary Collaboration:   o Physical Therapist  o Certified Occupational Therapy Assistant  o Registered Nurse    After treatment position/precautions:   o Up in chair  o Bed/Chair-wheels locked  o Caregiver at bedside  o Call light within reach  o RN notified     Compliance with Program/Exercises: Compliant all of the time, Will assess as treatment progresses. Recommendations/Intent for next treatment session:  Treatment next visit will focus on increasing Mr. Farhad Gillette independence with bed mobility, transfers, self care, functional mobility, modalities for pain, and patient education.       Total Treatment Duration:  OT Patient Time In/Time Out  Time In: 0830  Time Out: 0900     Marcelino Riley OT

## 2020-07-28 NOTE — PROGRESS NOTES
Hospitalist Note     Admit Date:  7/15/2020 10:37 PM   Name:  Gary Paredes   Age:  80 y.o.  :  1934   MRN:  [id number]   PCP:  Sarah Camargo MD  Treatment Team: Attending Provider: Lionel Beach MD; Utilization Review: Nereyda Carson RN; Care Manager: Ginnie Lanes, [facility] [place]; Consulting Provider: Aristeo Nguyen MD; Care Manager: Remigio Aarya; Physical Therapist: Bell Garcia PT; Occupational Therapist: Matthew Jc OT    HPI/Subjective: Gary Paredes is a 80 y.o. male with medical history significant for major depressive disorder/anxiety, neurogenic bladder with suprapubic catheter, hypertension, GERD who was admitted from home on 7/15 for confusion. Patient recently underwent right total knee arthroplasty on . Patient was found to be confused/dizzy by a friend on  then sent him to the ED. Patient was found to have A. fib RVR on arrival and met sepsis criteria. CXR is negative. She was recollected and patient was started on IV antibiotics. Ortho was consulted. Right TKA incision site appears to be healing well therefore it is unlikely the source of sepsis. There was noted to have some purulence around suprapubic catheter but culture has been negative to date. Patient appeared to develop mild volume overload and started on IV Lasix. He noted to have EMILI with elevation of creatinine on . He had episodes of fever spikes with T-max of 102.3  while on p.o. Keflex/doxycycline. Antibiotics was broadened to IV vancomycin and Zosyn and cultures were repeated. The blood cultures and urine cultures have been negative to date. Chest x-ray did not reveal any acute infiltrate. He has been saturating well on room air and completed a course of abx.      Patient seen and examined at bedside in no acute distress. Patient without complaints at this time. Patient denies any chest pain or shortness of breath.   He remains hemodynamically stable and afebrile. Objective:     Patient Vitals for the past 24 hrs:   Temp Pulse Resp BP SpO2   07/28/20 0735 97.9 °F (36.6 °C) 77  134/81 97 %   07/28/20 0350 98.1 °F (36.7 °C) 74 18 155/76 91 %   07/28/20 0006 97.4 °F (36.3 °C) 84 18 154/82 92 %   07/27/20 2000 98.3 °F (36.8 °C) 80 16 132/62 94 %   07/27/20 1545 97.6 °F (36.4 °C) 88 16 149/74 92 %     Oxygen Therapy  O2 Sat (%): 97 % (07/28/20 0735)  Pulse via Oximetry: 102 beats per minute (07/23/20 0719)  O2 Device: Room air (07/24/20 0750)  O2 Flow Rate (L/min): 2 l/min (07/23/20 0719)  FIO2 (%): 28 % (07/23/20 0719)    Estimated body mass index is 31.93 kg/m² as calculated from the following:    Height as of this encounter: 6' 1\" (1.854 m). Weight as of this encounter: 109.8 kg (242 lb). Intake/Output Summary (Last 24 hours) at 7/28/2020 1108  Last data filed at 7/28/2020 9508  Gross per 24 hour   Intake 360 ml   Output 2050 ml   Net -1690 ml       *Note that automatically entered I/Os may not be accurate; dependent on patient compliance with collection and accurate  by "[employer]. General:    Elderly male no acute distress  CV:   RRR. No murmur, rub, or gallop. Lungs:   CTAB. No wheezing, rhonchi, or rales. Abdomen:   Soft, nontender, nondistended. Extremities: Warm and dry. No cyanosis or edema. Skin:     No rashes or jaundice.    Neuro:  No gross focal deficits    Data Review:  I have reviewed all labs, meds, and studies from the last 24 hours:    Recent Results (from the past 24 hour(s))   CBC W/O DIFF    Collection Time: 07/28/20  3:35 AM   Result Value Ref Range    WBC 14.5 (H) 4.3 - 11.1 K/uL    RBC 3.12 (L) 4.23 - 5.6 M/uL    HGB 9.7 (L) 13.6 - 17.2 g/dL    HCT 30.9 (L) 41.1 - 50.3 %    MCV 99.0 (H) 79.6 - 97.8 FL    MCH 31.1 26.1 - 32.9 PG    MCHC 31.4 31.4 - 35.0 g/dL    RDW 13.7 11.9 - 14.6 %    PLATELET 273 972 - 519 K/uL    MPV 8.5 (L) 9.4 - 12.3 FL    ABSOLUTE NRBC 0.00 0.0 - 0.2 K/uL        All Micro Results Procedure Component Value Units Date/Time    CULTURE, BLOOD [[id number]] Collected:  20 1724    Order Status:  Completed Specimen:  Blood Updated:  20     Special Requests: --        RIGHT  Antecubital       Culture result: NO GROWTH 5 DAYS       CULTURE, BLOOD [[id number]] Collected:  20 1742    Order Status:  Completed Specimen:  Blood Updated:  20     Special Requests: --        RIGHT  HAND       Culture result: NO GROWTH 5 DAYS       CULTURE, BLOOD [[id number]] Collected:  07/15/20 2249    Order Status:  Completed Specimen:  Blood Updated:  20     Special Requests: --        RIGHT  Antecubital       Culture result: NO GROWTH 5 DAYS       CULTURE, BLOOD [[id number]] Collected:  07/15/20 2334    Order Status:  Completed Specimen:  Blood Updated:  20     Special Requests: --        RIGHT  HAND       Culture result: NO GROWTH 5 DAYS       CULTURE, Tsosie Beat STAIN [[id number]] Collected:  20 1213    Order Status:  Completed Specimen:  Wound from Skin Updated:  20     Special Requests: SUPRAPUBIC     GRAM STAIN 1 TO 5 WBCS SEEN PER OIF      NO DEFINITE ORGANISM SEEN        Culture result: NO GROWTH 2 DAYS       CULTURE, URINE [[id number]] Collected:  07/15/20 2321    Order Status:  Completed Specimen:  Urine Updated:  20 0837     Special Requests: NO SPECIAL REQUESTS        Culture result:       <10,000 COLONIES/mL MIXED SKIN MOE ISOLATED                Results for orders placed or performed during the hospital encounter of 07/15/20   2D ECHO COMPLETE ADULT (TTE) W OR WO CONTR    Narrative    [facility] [address] [facility], [facility] [address] [facility]  [phone]    Transthoracic Echocardiogram  2D, M-mode, Doppler, and Color Doppler    Patient: Monique Kemp  MR #: [id number]  : 1934  Age: 80 years  Gender: Male  Study date: 2020  Account #: [de-identified]  Height: 73 in  Weight: 242.4 lb  BSA: 2.34 mï¾²  Status:Routine  Location:   BP: 117/ 76    Allergies: NO KNOWN ALLERGENS    Sonographer:  Tomasa Mcintosh RDCS  Group:  [id number] Cardiology  Referring Physician:  PREM Mckenzie  Reading Physician:  Enriqueta Montano MD    INDICATIONS: Rapid Afib. PROCEDURE: This was a routine study. A transthoracic echocardiogram was  performed. The study included complete 2D imaging, M-mode, complete spectral  Doppler, and color Doppler. Intravenous contrast (Definity) was administered. Echocardiographic views were limited by restricted patient mobility. Image  quality was adequate. LEFT VENTRICLE: Size was normal. Systolic function was normal. Ejection  fraction was estimated in the range of 55 % to 60 %. This study was   inadequate  for the evaluation of regional wall motion. Wall thickness was normal. The  study was not technically sufficient to allow evaluation of LV diastolic  function. RIGHT VENTRICLE: The size was normal. Systolic function was normal. Estimated  peak pressure was in the range of 30-35 mmHg. LEFT ATRIUM: Size was normal. LA Volume Index=19mL/m^2. RIGHT ATRIUM: Size was normal.    SYSTEMIC VEINS: IVC: The inferior vena cava was not well visualized. AORTIC VALVE: The valve was structurally normal, tri-commissural. There was   no  evidence for stenosis. There was trivial regurgitation. MITRAL VALVE: Valve structure was normal. There was no evidence for stenosis. There was no regurgitation. TRICUSPID VALVE: The valve structure was normal. There was no evidence for  stenosis. There was mild regurgitation. PULMONIC VALVE: The valve structure was normal. There was no evidence for  stenosis. There was no insufficiency. PERICARDIUM: There was no pericardial effusion. AORTA: The root exhibited mild dilatation. There was mild dilatation of the  ascending aorta.     SUMMARY:    -  Left ventricle: Systolic function was normal. Ejection fraction was  estimated in the range of 55 % to 60 %. This study was inadequate for the  evaluation of regional wall motion.    -  Tricuspid valve: There was mild regurgitation.    -  Aorta, systemic arteries: The root exhibited mild dilatation. There was   mild  dilatation of the ascending aorta. SYSTEM MEASUREMENT TABLES    2D mode  AoR Diam (2D): 3.9 cm  IVS/LVPW (2D): 1  IVSd (2D): 1.2 cm  LVIDd (2D): 4.4 cm  LVIDs (2D): 2.6 cm  LVOT Area (2D): 3.1 cm2  LVPWd (2D): 1.2 cm  RVIDd (2D): 4 cm    Unspecified Scan Mode  Peak Grad; Mean; Antegrade Flow: 6 mm[Hg]  Vmax;  Antegrade Flow: 124 cm/s  LVOT Diam: 2 cm    Prepared and signed by    Chris Bynum MD  Signed 21-Jul-2020 11:24:23         Current Meds:  Current Facility-Administered Medications   Medication Dose Route Frequency    bisacodyL (DULCOLAX) tablet 5 mg  5 mg Oral DAILY    bisacodyL (DULCOLAX) suppository 10 mg  10 mg Rectal DAILY PRN    acetaminophen (TYLENOL) tablet 1,000 mg  1,000 mg Oral Q6H PRN    sodium chloride (NS) flush 5-40 mL  5-40 mL IntraVENous Q8H    sodium chloride (NS) flush 5-40 mL  5-40 mL IntraVENous PRN    HYDROcodone-acetaminophen (NORCO) 5-325 mg per tablet 1 Tab  1 Tab Oral Q4H PRN    naloxone (NARCAN) injection 0.4 mg  0.4 mg IntraVENous PRN    ondansetron (ZOFRAN) injection 4 mg  4 mg IntraVENous Q4H PRN    senna-docusate (PERICOLACE) 8.6-50 mg per tablet 2 Tab  2 Tab Oral DAILY PRN    LORazepam (ATIVAN) tablet 0.5 mg  0.5 mg Oral Q4H PRN    aspirin delayed-release tablet 81 mg  81 mg Oral BID    citalopram (CELEXA) tablet 20 mg  20 mg Oral DAILY    [Held by provider] losartan (COZAAR) tablet 25 mg  25 mg Oral QHS    metoprolol succinate (TOPROL-XL) XL tablet 100 mg  100 mg Oral QHS    pantoprazole (PROTONIX) tablet 40 mg  40 mg Oral ACB    oxyCODONE IR (ROXICODONE) tablet 5 mg  5 mg Oral Q4H PRN    polyethylene glycol (MIRALAX) packet 17 g  17 g Oral DAILY    senna-docusate (PERICOLACE) 8.6-50 mg per tablet 1 Tab  1 Tab Oral DAILY    traZODone (DESYREL) tablet 100 mg  100 mg Oral QHS PRN    metoprolol (LOPRESSOR) injection 5 mg  5 mg IntraVENous Q4H PRN       Other Studies (last 24 hours):  No results found. Assessment and Plan:     Hospital Problems as of 7/28/2020 Date Reviewed: 7/13/2020          Codes Class Noted - Resolved POA    EMILI (acute kidney injury) ([facility] ICD-10-CM: N17.9  ICD-9-CM: 584.9  7/20/2020 - Present Unknown        Atrial fibrillation with RVR ([facility] ICD-10-CM: I48.91  ICD-9-CM: 427.31  7/16/2020 - Present Yes        * (Principal) Sepsis ([place] [address].) ICD-10-CM: A41.9  ICD-9-CM: 038.9, 995.91  7/16/2020 - Present Yes        Neurogenic bladder ICD-10-CM: N31.9  ICD-9-CM: 596.54  7/16/2020 - Present Yes        Hypertension ICD-10-CM: I10  ICD-9-CM: 401.9  Unknown - Present Yes        GERD (gastroesophageal reflux disease) ICD-10-CM: K21.9  ICD-9-CM: 530.81  Unknown - Present Yes    Overview Signed 3/15/2017  2:59 PM by Gilberto Florian     controlled w/med                   Sepsis likely from suprapubic cath site   All cultures have been neg to date. Febrile episode with Tmax of 102.3 on 7/22 overnight while on PO keflex/doxy since 7/18. Continues to have leukocytosis, febrile episode, elevated heart rate meeting SIRS criteria for sepsis of unknown etiology. Repeat UA on 7/21 is negative for UTI. Repeat CXR (7/22) without any new consolidation/infiltrates. Repeat blood culture(7/21) neg to date  - s/p 3 days of vancomycin(7/22-7/24), doxy/keflex(7/28-7/21)  -Completed course of Zosyn     Afib RVR (resolved)  Likely due to acute illness/sepsis. Now sinus rhythm with possible PVC or aberrant complexes  - continue with metoprolol XL 100mg qHS  - no AC for now given recent procedure      MDD/Anxiety  - continue with celexa, PRN ativan.   - per notes, he had suicidal ideation a few nights ago but he has denied any SI to me and during psych consult     Hypertension: continue with home medications  GERD: continue with PPI     Hypoxia likely due to underlying sepsis and volume overload(resolved)  EMILI(resolved)      DC planning/Dispo: Pending insurance approval      Diet:  DIET REGULAR  DVT ppx:  SCD    Signed:  Toma Fernando MD

## 2020-07-29 VITALS
OXYGEN SATURATION: 98 % | HEART RATE: 74 BPM | DIASTOLIC BLOOD PRESSURE: 63 MMHG | WEIGHT: 242 LBS | RESPIRATION RATE: 18 BRPM | TEMPERATURE: 98.5 F | HEIGHT: 73 IN | SYSTOLIC BLOOD PRESSURE: 135 MMHG | BODY MASS INDEX: 32.07 KG/M2

## 2020-07-29 LAB
COVID-19 RAPID TEST, COVR: NOT DETECTED
ERYTHROCYTE [DISTWIDTH] IN BLOOD BY AUTOMATED COUNT: 13.9 % (ref 11.9–14.6)
GLUCOSE BLD STRIP.AUTO-MCNC: 104 MG/DL (ref 65–100)
HCT VFR BLD AUTO: 33.2 % (ref 41.1–50.3)
HGB BLD-MCNC: 10.2 G/DL (ref 13.6–17.2)
MCH RBC QN AUTO: 29.6 PG (ref 26.1–32.9)
MCHC RBC AUTO-ENTMCNC: 30.7 G/DL (ref 31.4–35)
MCV RBC AUTO: 96.2 FL (ref 79.6–97.8)
MM INDURATION POC: 0 MM (ref 0–5)
NRBC # BLD: 0 K/UL (ref 0–0.2)
PLATELET # BLD AUTO: 482 K/UL (ref 150–450)
PMV BLD AUTO: 8.3 FL (ref 9.4–12.3)
PPD POC: NEGATIVE NEGATIVE
RBC # BLD AUTO: 3.45 M/UL (ref 4.23–5.6)
SOURCE, COVRS: NORMAL
WBC # BLD AUTO: 15.1 K/UL (ref 4.3–11.1)

## 2020-07-29 PROCEDURE — 36415 COLL VENOUS BLD VENIPUNCTURE: CPT

## 2020-07-29 PROCEDURE — 85027 COMPLETE CBC AUTOMATED: CPT

## 2020-07-29 PROCEDURE — 74011250637 HC RX REV CODE- 250/637: Performed by: NURSE PRACTITIONER

## 2020-07-29 PROCEDURE — 3331090001 HH PPS REVENUE CREDIT

## 2020-07-29 PROCEDURE — 3331090002 HH PPS REVENUE DEBIT

## 2020-07-29 PROCEDURE — 74011250637 HC RX REV CODE- 250/637: Performed by: INTERNAL MEDICINE

## 2020-07-29 PROCEDURE — 97530 THERAPEUTIC ACTIVITIES: CPT

## 2020-07-29 PROCEDURE — 87635 SARS-COV-2 COVID-19 AMP PRB: CPT

## 2020-07-29 PROCEDURE — 82962 GLUCOSE BLOOD TEST: CPT

## 2020-07-29 RX ORDER — LORAZEPAM 0.5 MG/1
0.5 TABLET ORAL
Qty: 3 TAB | Refills: 0 | Status: SHIPPED | OUTPATIENT
Start: 2020-07-29 | End: 2020-08-03 | Stop reason: SDUPTHER

## 2020-07-29 RX ADMIN — PANTOPRAZOLE SODIUM 40 MG: 40 TABLET, DELAYED RELEASE ORAL at 05:49

## 2020-07-29 RX ADMIN — LORAZEPAM 0.5 MG: 0.5 TABLET ORAL at 00:26

## 2020-07-29 RX ADMIN — ASPIRIN 81 MG: 81 TABLET, COATED ORAL at 08:36

## 2020-07-29 RX ADMIN — LORAZEPAM 0.5 MG: 0.5 TABLET ORAL at 10:42

## 2020-07-29 RX ADMIN — LORAZEPAM 0.5 MG: 0.5 TABLET ORAL at 05:52

## 2020-07-29 RX ADMIN — CITALOPRAM HYDROBROMIDE 20 MG: 20 TABLET ORAL at 08:36

## 2020-07-29 RX ADMIN — POLYETHYLENE GLYCOL (3350) 17 G: 17 POWDER, FOR SOLUTION ORAL at 08:35

## 2020-07-29 RX ADMIN — ACETAMINOPHEN 1000 MG: 500 TABLET, FILM COATED ORAL at 00:26

## 2020-07-29 RX ADMIN — DOCUSATE SODIUM 50MG AND SENNOSIDES 8.6MG 1 TABLET: 8.6; 5 TABLET, FILM COATED ORAL at 08:36

## 2020-07-29 RX ADMIN — LORAZEPAM 0.5 MG: 0.5 TABLET ORAL at 16:54

## 2020-07-29 RX ADMIN — HYDROCODONE BITARTRATE AND ACETAMINOPHEN 1 TABLET: 5; 325 TABLET ORAL at 10:42

## 2020-07-29 RX ADMIN — HYDROCODONE BITARTRATE AND ACETAMINOPHEN 1 TABLET: 5; 325 TABLET ORAL at 16:54

## 2020-07-29 NOTE — PROGRESS NOTES
Shift assessment complete. Pt resting in bed. A&Ox4. Respirations present, even, unlabored. Lung sounds diminished to auscultation. HR irregular, tele box in place, box # [id number]. IV capped and patent. Surgical incision to right knee c/d/i with iceman in place. Pedal pulses +2 and palpable. Pt denies pain at this time. Bed in lowest position, call light within reach, side rails x3. Encouraged to call for help when needed.

## 2020-07-29 NOTE — DISCHARGE SUMMARY
Hospitalist Discharge Summary     Admit Date:  7/15/2020 10:37 PM   Name:  Gary Paredes   Age:  80 y.o.  :  1934   MRN:  [id number]   PCP:  Sarah Camargo MD  Treatment Team: Attending Provider: Kathryn Anglin MD; Utilization Review: Nereyda Carson RN; Care Manager: Ginnie Lanes, LMSW; Consulting Provider: Aristeo Nguyen MD; Care Manager: Remigio Araya; Physical Therapist: Munir Longoria PT    Problem List for this Hospitalization:  Hospital Problems as of 2020 Date Reviewed: 2020          Codes Class Noted - Resolved POA    EMILI (acute kidney injury) ([facility] ICD-10-CM: N17.9  ICD-9-CM: 584.9  2020 - Present Unknown        Atrial fibrillation with RVR ([facility] ICD-10-CM: I48.91  ICD-9-CM: 427.31  2020 - Present Yes        * (Principal) Sepsis ([facility] ICD-10-CM: A41.9  ICD-9-CM: 038.9, 995.91  2020 - Present Yes        Neurogenic bladder ICD-10-CM: N31.9  ICD-9-CM: 596.54  2020 - Present Yes        Hypertension ICD-10-CM: I10  ICD-9-CM: 401.9  Unknown - Present Yes        GERD (gastroesophageal reflux disease) ICD-10-CM: K21.9  ICD-9-CM: 530.81  Unknown - Present Yes    Overview Signed 3/15/2017  2:59 PM by Trupti wong w/med                     Admission HPI from 7/15/2020:    \"80 year old male from home sent in for confusion/shaking. Information obtained from patients granddaughter at bedside Kalen Santos [phone]) as the patient is not a reliable source. He had right knee arthroplasty on  and was sent home with home rehab. His friend was helping him out and noticed he was confused, dizzy. There was concern that he may have mixed up his medications. His only complaint is that his right knee hurts and is requesting a pain pill.      While in the ER he was febrile, had a leukocytosis of 16,000 and a swollen right knee that is warm to touch.  He was also in atrial fibrillation with rapid ventricular rate.     The ER consulted the hospitalist for admission. \"    Hospital Course: In summary, 80 y. o. male with medical history significant for major depressive disorder/anxiety, neurogenic bladder with suprapubic catheter, hypertension, GERD who was admitted from home on 7/15 for confusion. Kennedy Hutchinson recently underwent right total knee arthroplasty on 7/13. Patient was found to be confused/dizzy by a friend on 7/12 then sent him to the ED.  Patient was found to have A. fib RVR on arrival and met sepsis criteria.  CXR was negative.  Cultures  recollected and patient was started on IV antibiotics. Analilia Gonzalez was consulted. Right TKA incision site appears to be healing well therefore it is unlikely the source of sepsis. There was noted to have some purulence around suprapubic catheter but culture has been negative to date.      Patient appeared to develop mild volume overload and started on IV Lasix.  He noted to have EMILI with elevation of creatinine on 7/20.  He had episodes of fever spikes with T-max of 102.3 7/22 while on p.o. Keflex/doxycycline.  Antibiotics was broadened to IV vancomycin and Zosyn and cultures were repeated.  The blood cultures and urine cultures have been negative to date.  Chest x-ray did not reveal any acute infiltrate. He has been saturating well on room air and completed a course of abx. Patient continues to have leukocytosis. Which she will need outpatient follow-up to assess for resolution in the upcoming days. If no resolution will need hematological/oncological work-up. Hospital course complicated by depression. Patient reportedly claimed suicidal ideation. He was evaluated by psychiatry and denied suicidal ideation at that time. During examination on multiple days patient denied any suicidal ideation. He reports he was just upset from being in the hospital.    Patient seen and examined on day of discharge. All questions and concerns addressed. Discharge plan discussed in detail.   Patient was evaluated by acute rehab and has been accepted. Disposition: Skilled Nursing Facility  Activity: Ambulate with assistance  Diet: DIET REGULAR  Code Status: Full Code      Follow up instructions, discharge meds at bottom of this note. Plan was discussed with patient. All questions answered. Patient was stable at time of discharge. Patient will call a physician or return if any concerns. Diagnostic Imaging/Tests:   Xr Chest Port    Result Date: 7/15/2020  EXAM: Chest x-ray. INDICATION: Dyspnea. COMPARISON: None. TECHNIQUE: Single frontal view. FINDINGS: The lungs are clear. The heart is enlarged. Mediastinal contour and pulmonary vasculature are within normal limits. No pneumothorax or pleural effusion is seen. IMPRESSION: 1. No acute process. 2. Cardiomegaly. Xr Knee Rt 3 V    Result Date: 7/16/2020  Portable right knee  7/16/2020 1:20 PM Indication: Patient immediately status post TKA Comparison: None available at this hospital PACS Findings: Portable AP, oblique and crosstable lateral views from 1246 are submitted. There is an anteriorly located vertically oriented skin staple line. There is soft tissues swelling and trace collections of air in the soft tissues and joints. No unexpected radiopaque foreign body. TKA prosthesis components appear in appropriate alignment with no acute complicating features. No bony fracture. Impression: Expected immediate postop TKA findings. No acute complicating features. Duplex Lower Ext Venous Right    Result Date: 7/16/2020  RIGHT LOWER EXTREMITY DEEP VENOUS SONOGRAPHY: CLINICAL HISTORY: Leg pain and swelling after recent right knee surgery. FINDINGS: Multiple images from real time ultrasound evaluation of the deep venous system of the right leg demonstrate normal venous flow in the posterior tibial, popliteal, and superficial and common femoral veins. Normal compressibility was demonstrated. Flow was also documented in the proximal saphenous and deep femoral veins. No intraluminal echogenic material was seen to suggest the presence of nonobstructive thrombus. IMPRESSION: NO ULTRASOUND EVIDENCE OF DEEP VENOUS THROMBOSIS IN THE RIGHT LEG. Echocardiogram results:  Results for orders placed or performed during the hospital encounter of 07/15/20   2D ECHO COMPLETE ADULT (TTE) W OR WO CONTR    Narrative    Navdeep Dawkins [address], [address] [facility]  [phone]    Transthoracic Echocardiogram  2D, M-mode, Doppler, and Color Doppler    Patient: Juan Kim  MR #: [id number]  : 1934  Age: 80 years  Gender: Male  Study date: 2020  Account #: [de-identified]  Height: 73 in  Weight: 242.4 lb  BSA: 2.34 mï¾²  Status:Routine  Location: [facility]  BP: 117/ 76    Allergies: NO KNOWN ALLERGENS    Sonographer:  Tiffanie Coppola RDCS  Group:  [facility]  Referring Physician:  Shirley January [facility]  Reading Physician:  Tony Trujillo MD    INDICATIONS: Rapid Afib. PROCEDURE: This was a routine study. A transthoracic echocardiogram was  performed. The study included complete 2D imaging, M-mode, complete spectral  Doppler, and color Doppler. Intravenous contrast (Definity) was administered. Echocardiographic views were limited by restricted patient mobility. Image  quality was adequate. LEFT VENTRICLE: Size was normal. Systolic function was normal. Ejection  fraction was estimated in the range of 55 % to 60 %. This study was   inadequate  for the evaluation of regional wall motion. Wall thickness was normal. The  study was not technically sufficient to allow evaluation of LV diastolic  function. RIGHT VENTRICLE: The size was normal. Systolic function was normal. Estimated  peak pressure was in the range of 30-35 mmHg. LEFT ATRIUM: Size was normal. LA Volume Index=19mL/m^2. RIGHT ATRIUM: Size was normal.    SYSTEMIC VEINS: IVC: The inferior vena cava was not well visualized.     AORTIC VALVE: The valve was structurally normal, tri-commissural. There was   no  evidence for stenosis. There was trivial regurgitation. MITRAL VALVE: Valve structure was normal. There was no evidence for stenosis. There was no regurgitation. TRICUSPID VALVE: The valve structure was normal. There was no evidence for  stenosis. There was mild regurgitation. PULMONIC VALVE: The valve structure was normal. There was no evidence for  stenosis. There was no insufficiency. PERICARDIUM: There was no pericardial effusion. AORTA: The root exhibited mild dilatation. There was mild dilatation of the  ascending aorta. SUMMARY:    -  Left ventricle: Systolic function was normal. Ejection fraction was  estimated in the range of 55 % to 60 %. This study was inadequate for the  evaluation of regional wall motion.    -  Tricuspid valve: There was mild regurgitation.    -  Aorta, systemic arteries: The root exhibited mild dilatation. There was   mild  dilatation of the ascending aorta. SYSTEM MEASUREMENT TABLES    2D mode  AoR Diam (2D): 3.9 cm  IVS/LVPW (2D): 1  IVSd (2D): 1.2 cm  LVIDd (2D): 4.4 cm  LVIDs (2D): 2.6 cm  LVOT Area (2D): 3.1 cm2  LVPWd (2D): 1.2 cm  RVIDd (2D): 4 cm    Unspecified Scan Mode  Peak Grad; Mean; Antegrade Flow: 6 mm[Hg]  Vmax;  Antegrade Flow: 124 cm/s  LVOT Diam: 2 cm    Prepared and signed by    Janay Andrade MD  Signed 21-Jul-2020 11:24:23         Procedures done this admission:  * No surgery found *    All Micro Results     Procedure Component Value Units Date/Time    CULTURE, BLOOD [[id number]] Collected:  07/21/20 1724    Order Status:  Completed Specimen:  Blood Updated:  07/26/20 0725     Special Requests: --        RIGHT  Antecubital       Culture result: NO GROWTH 5 DAYS       CULTURE, BLOOD [[id number]] Collected:  07/21/20 1742    Order Status:  Completed Specimen:  Blood Updated:  07/26/20 0725     Special Requests: --        RIGHT  HAND       Culture result: NO GROWTH 5 DAYS       CULTURE, BLOOD [[id number]] Collected:  07/15/20 2249    Order Status:  Completed Specimen:  Blood Updated:  07/20/20 0726     Special Requests: --        RIGHT  Antecubital       Culture result: NO GROWTH 5 DAYS       CULTURE, BLOOD [[id number]] Collected:  07/15/20 2334    Order Status:  Completed Specimen:  Blood Updated:  07/20/20 0726     Special Requests: --        RIGHT  HAND       Culture result: NO GROWTH 5 DAYS       CULTURE, Eduin Arango STAIN [[id number]] Collected:  07/16/20 1213    Order Status:  Completed Specimen:  Wound from Skin Updated:  07/19/20 0730     Special Requests: SUPRAPUBIC     GRAM STAIN 1 TO 5 WBCS SEEN PER OIF      NO DEFINITE ORGANISM SEEN        Culture result: NO GROWTH 2 DAYS       CULTURE, URINE [[id number]] Collected:  07/15/20 2321    Order Status:  Completed Specimen:  Urine Updated:  07/18/20 0837     Special Requests: NO SPECIAL REQUESTS        Culture result:       <10,000 COLONIES/mL MIXED SKIN MOE ISOLATED                Labs: Results:       BMP, Mg, Phos Recent Labs     07/27/20  0330      K 3.9      CO2 27   AGAP 8   BUN 15   CREA 1.01   CA 8.9   *      CBC Recent Labs     07/29/20  0421 07/28/20  0335 07/27/20  0330   WBC 15.1* 14.5* 13.7*   RBC 3.45* 3.12* 3.13*   HGB 10.2* 9.7* 9.4*   HCT 33.2* 30.9* 30.1*   * 435 423   GRANS  --   --  75   LYMPH  --   --  12*   EOS  --   --  2   MONOS  --   --  9   BASOS  --   --  1   IG  --   --  1   ANEU  --   --  10.3*   ABL  --   --  1.6   LUCY  --   --  0.3   ABM  --   --  1.3   ABB  --   --  0.1   AIG  --   --  0.1      LFT No results for input(s): ALT, TBIL, AP, TP, ALB, GLOB, AGRAT in the last 72 hours.     No lab exists for component: SGOT, GPT   Cardiac Testing Lab Results   Component Value Date/Time     07/15/2020 10:49 PM      Coagulation Tests Lab Results   Component Value Date/Time    Prothrombin time 13.3 07/06/2020 04:07 PM    Prothrombin time 10.1 10/23/2012 02:25 PM    Prothrombin time 10.2 08/21/2012 11:53 AM    INR 1.0 07/06/2020 04:07 PM    INR 1.0 10/23/2012 02:25 PM    INR 1.0 08/21/2012 11:53 AM    aPTT 28.0 07/06/2020 04:07 PM    aPTT 27.6 10/23/2012 02:25 PM    aPTT 25.3 08/21/2012 11:53 AM      A1c Lab Results   Component Value Date/Time    Hemoglobin A1c 6.3 07/06/2020 04:07 PM      Lipid Panel No results found for: CHOL, CHOLPOCT, CHOLX, CHLST, CHOLV, 598614, HDL, HDLP, LDL, LDLC, DLDLP, 588979, VLDLC, VLDL, TGLX, TRIGL, TRIGP, TGLPOCT, [facility], [facility]   Thyroid Panel Lab Results   Component Value Date/Time    TSH 3.590 01/30/2018 04:15 PM        Most Recent UA Lab Results   Component Value Date/Time    Color BLANCA 07/21/2020 06:35 PM    Appearance CLEAR 07/21/2020 06:35 PM    Specific gravity 1.021 07/21/2020 06:35 PM    pH (UA) 6.5 07/21/2020 06:35 PM    Protein TRACE (A) 07/21/2020 06:35 PM    Glucose Negative 07/21/2020 06:35 PM    Ketone Negative 07/21/2020 06:35 PM    Bilirubin Negative 07/21/2020 06:35 PM    Blood Negative 07/21/2020 06:35 PM    Urobilinogen 4.0 (H) 07/21/2020 06:35 PM    Nitrites Negative 07/21/2020 06:35 PM    Leukocyte Esterase Negative 07/21/2020 06:35 PM    WBC 0-3 07/21/2020 06:35 PM    RBC 0-3 07/21/2020 06:35 PM    Epithelial cells 0-3 07/21/2020 06:35 PM    Bacteria 0 07/21/2020 06:35 PM    Casts 0 07/21/2020 06:35 PM    Other observations RESULTS VERIFIED MANUALLY 07/15/2020 11:21 PM        No Known Allergies  Immunization History   Administered Date(s) Administered    (RETIRED) Pneumococcal Vaccine (Unspecified Type) 10/21/2011    Influenza Vaccine Split 10/21/2011    TB Skin Test (PPD) Intradermal 07/19/2020       All Labs from Last 24 Hrs:  Recent Results (from the past 24 hour(s))   CBC W/O DIFF    Collection Time: 07/29/20  4:21 AM   Result Value Ref Range    WBC 15.1 (H) 4.3 - 11.1 K/uL    RBC 3.45 (L) 4.23 - 5.6 M/uL    HGB 10.2 (L) 13.6 - 17.2 g/dL    HCT 33.2 (L) 41.1 - 50.3 %    MCV 96.2 79.6 - 97.8 FL    MCH 29.6 26.1 - 32.9 PG    MCHC 30.7 (L) 31.4 - 35.0 g/dL    RDW 13.9 11.9 - 14.6 %    PLATELET 794 (H) 702 - 450 K/uL    MPV 8.3 (L) 9.4 - 12.3 FL    ABSOLUTE NRBC 0.00 0.0 - 0.2 K/uL   GLUCOSE, POC    Collection Time: 07/29/20 12:19 PM   Result Value Ref Range    Glucose (POC) 104 (H) 65 - 100 mg/dL   SARS-COV-2    Collection Time: 07/29/20  1:56 PM   Result Value Ref Range    Specimen source Nasopharyngeal      SARS CoV-2 PENDING        Current Med List in Hospital:   Current Facility-Administered Medications   Medication Dose Route Frequency    bisacodyL (DULCOLAX) tablet 5 mg  5 mg Oral DAILY    bisacodyL (DULCOLAX) suppository 10 mg  10 mg Rectal DAILY PRN    acetaminophen (TYLENOL) tablet 1,000 mg  1,000 mg Oral Q6H PRN    sodium chloride (NS) flush 5-40 mL  5-40 mL IntraVENous Q8H    sodium chloride (NS) flush 5-40 mL  5-40 mL IntraVENous PRN    HYDROcodone-acetaminophen (NORCO) 5-325 mg per tablet 1 Tab  1 Tab Oral Q4H PRN    naloxone (NARCAN) injection 0.4 mg  0.4 mg IntraVENous PRN    ondansetron (ZOFRAN) injection 4 mg  4 mg IntraVENous Q4H PRN    senna-docusate (PERICOLACE) 8.6-50 mg per tablet 2 Tab  2 Tab Oral DAILY PRN    LORazepam (ATIVAN) tablet 0.5 mg  0.5 mg Oral Q4H PRN    aspirin delayed-release tablet 81 mg  81 mg Oral BID    citalopram (CELEXA) tablet 20 mg  20 mg Oral DAILY    [Held by provider] losartan (COZAAR) tablet 25 mg  25 mg Oral QHS    metoprolol succinate (TOPROL-XL) XL tablet 100 mg  100 mg Oral QHS    pantoprazole (PROTONIX) tablet 40 mg  40 mg Oral ACB    oxyCODONE IR (ROXICODONE) tablet 5 mg  5 mg Oral Q4H PRN    polyethylene glycol (MIRALAX) packet 17 g  17 g Oral DAILY    senna-docusate (PERICOLACE) 8.6-50 mg per tablet 1 Tab  1 Tab Oral DAILY    traZODone (DESYREL) tablet 100 mg  100 mg Oral QHS PRN    metoprolol (LOPRESSOR) injection 5 mg  5 mg IntraVENous Q4H PRN       Discharge Exam:  Patient Vitals for the past 24 hrs:   Temp Pulse Resp BP SpO2   07/29/20 1130 98.5 °F (36.9 °C) 74 18 135/63 98 % 07/29/20 0700 97.7 °F (36.5 °C) 71 18 177/85 96 %   07/29/20 0415 97.9 °F (36.6 °C) 85 18 148/78 92 %   07/29/20 0025 98 °F (36.7 °C) 75 18 172/80 90 %   07/28/20 2000 98.2 °F (36.8 °C) 82 18 159/70 95 %   07/28/20 1545 98.2 °F (36.8 °C) 80 18 151/76 94 %     Oxygen Therapy  O2 Sat (%): 98 % (07/29/20 1130)  Pulse via Oximetry: 102 beats per minute (07/23/20 0719)  O2 Device: Room air (07/29/20 0841)  O2 Flow Rate (L/min): 2 l/min (07/23/20 0719)  FIO2 (%): 28 % (07/23/20 0719)    Estimated body mass index is 31.93 kg/m² as calculated from the following:    Height as of this encounter: 6' 1\" (1.854 m). Weight as of this encounter: 109.8 kg (242 lb). Intake/Output Summary (Last 24 hours) at 7/29/2020 1506  Last data filed at 7/29/2020 1138  Gross per 24 hour   Intake 360 ml   Output 2550 ml   Net -2190 ml       *Note that automatically entered I/Os may not be accurate; dependent on patient compliance with collection and accurate  by assistants. General:    Elderly male no acute distress  Eyes:   Normal sclerae. Extraocular movements intact. ENT:  Normocephalic, atraumatic. Moist mucous membranes  CV:   Regular rate and rhythm. No murmur, rub, or gallop. Lungs:  Clear to auscultation bilaterally. No wheezing, rhonchi, or rales. Abdomen: Soft, nontender, nondistended. Bowel sounds normal.   Extremities: Warm and dry. No cyanosis or edema. Neurologic: No gross focal deficits  Skin:     No rashes or jaundice. Psych:  Normal mood and affect. Discharge Info:   Current Discharge Medication List      CONTINUE these medications which have CHANGED    Details   LORazepam (ATIVAN) 0.5 mg tablet Take 1 Tab by mouth every eight (8) hours as needed for Anxiety.  Max Daily Amount: 1.5 mg.  Qty: 3 Tab, Refills: 0    Associated Diagnoses: Anxiety         CONTINUE these medications which have NOT CHANGED    Details   acetaminophen (TYLENOL) 500 mg tablet Take 2 Tabs by mouth every six (6) hours as needed for Pain. Qty: 60 Tab, Refills: 0      aspirin delayed-release 81 mg tablet Take 1 Tab by mouth two (2) times a day. Qty: 60 Tab, Refills: 0      ondansetron (ZOFRAN ODT) 8 mg disintegrating tablet Take 1 Tab by mouth every eight (8) hours as needed for Nausea or Vomiting. Qty: 30 Tab, Refills: 0      meloxicam (MOBIC) 7.5 mg tablet Take 1 Tab by mouth daily. Qty: 30 Tab, Refills: 2      senna-docusate (PERICOLACE) 8.6-50 mg per tablet Take 1 Tab by mouth daily. Qty: 30 Tab, Refills: 0      citalopram (CELEXA) 20 mg tablet TAKE ONE TABLET BY MOUTH EVERY DAY. Qty: 90 Tab, Refills: 2    Associated Diagnoses: Depression, unspecified depression type      hyoscyamine SL (Levsin/SL) 0.125 mg SL tablet 1 Tab by SubLINGual route every four (4) hours as needed for Cramping. Qty: 30 Tab, Refills: 1      traZODone (DESYREL) 100 mg tablet TAKE 1 TABLET BY MOUTH AT BEDTIME  Qty: 90 Tab, Refills: 1      omeprazole (PRILOSEC) 40 mg capsule Take 1 Cap by mouth daily. Qty: 90 Cap, Refills: 3      metoprolol tartrate (LOPRESSOR) 100 mg IR tablet Take 100 mg by mouth nightly. losartan (COZAAR) 25 mg tablet Take 25 mg by mouth nightly. nystatin (MYCOSTATIN) 100,000 unit/mL suspension Take 5 mL by mouth four (4) times daily. swish and spit  Qty: 60 mL, Refills: 2    Associated Diagnoses: Ulcer aphthous oral      levETIRAcetam 1,000 mg tablet Take 1/2 of tablet every 12 hours  Qty: 90 Tab, Refills: 1      multivitamin (ONE A DAY) tablet Take 1 Tab by mouth daily. polyethylene glycol (MIRALAX) 17 gram/dose powder Take 17 g by mouth daily. Qty: 850 g, Refills: 11         STOP taking these medications       oxyCODONE IR (ROXICODONE) 5 mg immediate release tablet Comments:   Reason for Stopping: Follow Up Orders:   Follow-up Appointments   Procedures    FOLLOW UP VISIT Appointment in: One Week Follow-up with PCP within 1 week     Follow-up with PCP within 1 week  Follow-up with orthopedics in 1 to 2 weeks     Standing Status:   Standing     Number of Occurrences:   1     Order Specific Question:   Appointment in     Answer: One Week       Follow-up Information     Follow up With Specialties Details Why Contact Info    Lois Lim MD Family Medicine  As needed [facility] [place] [address] [address] [facility]  [phone]            Time spent in patient discharge planning and coordination 35 minutes.     Signed:  Maria Del Carmen Boucher MD

## 2020-07-29 NOTE — PROGRESS NOTES
Problem: Mobility Impaired (Adult and Pediatric)  Goal: *Acute Goals and Plan of Care (Insert Text)  STG's 1-3 MET 7/24/20 :  (1.)Mr. Kacy Calero will move from supine to sit and sit to supine  in bed with MINIMAL ASSIST. Met 7/19  (2.)Mr. Kacy Calero will transfer from bed to chair and chair to bed with MINIMAL ASSIST using the least restrictive device. Met 7/24  (3.)Mr. Kacy Calero will ambulate with MINIMAL ASSIST for 25 feet with the least restrictive device. Met 7/24    RE-EVALUATION / GOALS RE-ASSESSED 7/29/20 :   1) Bd mobility with HOB 20 deg & rail with SBA (consistently). 2) Transfers with walker & CGA (consistently). 3) pt ambulating  ft with rolling walker & CGA (consistently. (4)Mr. Kacy Calero will increase right knee ROM to 5°-80° and perform HEP with cues and assistance. .          ________________________________________________________________________________________________      PHYSICAL THERAPY: Re-evaluation and AM 7/29/2020  INPATIENT: PT Visit Days : 1  Payor: [facility] MEDICARE / Plan: [facility] / Product Type: Managed Care Medicare /       NAME/AGE/GENDER: Sarah Castrejon is a 80 y.o. male   PRIMARY DIAGNOSIS: Sepsis ([address] [A41.9]  Atrial fibrillation with RVR ([address] [I48.91] Sepsis ([address] Sepsis ([address]       ICD-10: Treatment Diagnosis:    · Generalized Muscle Weakness (M62.81)  · Other abnormalities of gait and mobility (R26.89)   Precaution/Allergies:  Patient has no known allergies. ASSESSMENT:     Mr. Kacy Calero showed good knee stability during [address] activity without KI. Pt showed increased gait distance & performed exercises without difficulty. This section established at most recent assessment   PROBLEM LIST (Impairments causing functional limitations):  1. Decreased Strength  2. Decreased ADL/Functional Activities  3. Decreased Transfer Abilities  4. Decreased Ambulation Ability/Technique  5. Decreased Balance  6. Increased Pain  7.  Decreased Activity Tolerance  8. Increased Fatigue  9. Decreased Flexibility/Joint Mobility  10. Edema/Girth  11. Decreased [place] with Home Exercise Program   INTERVENTIONS PLANNED: (Benefits and precautions of physical therapy have been discussed with the patient.)  1. Balance Exercise  2. Bed Mobility  3. Family Education  4. Gait Training  5. Home Exercise Program (HEP)  6. Range of Motion (ROM)  7. Therapeutic Activites  8. Therapeutic Exercise/Strengthening  9. Transfer Training     TREATMENT PLAN: Frequency/Duration: daily for duration of hospital stay  Rehabilitation Potential For Stated Goals: Good     REHAB RECOMMENDATIONS (at time of discharge pending progress):    Placement:  Rehab in pt 9th floor  Equipment:    to be determined              HISTORY:   History of Present Injury/Illness (Reason for Referral):  Mr. Angela Montes is a nice 79 y/o WM sent to the ER from home on 7/15 for confusion. 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 Yesterday a friend thought he was confused and dizzy and sent him to the ER. He was in rapid AFib and met sepsis criteria. CXR negative. Cultures collected. He was started on antibiotics and a Cardizem drip and admitted. Ortho consulted. 7/17: O2 from 2L to 4L. Afebrile overnight. WBCs up a little. He is asking if he can go home today. Overnight nurse changed knee bandage, I reviewed a picture that was taken of the surgical site and there was one circular area of erythema just distal to the knee, lateral side of incision. Had to get Cardizem bolus overnight, back in NSR this morning. Bps good.   Past Medical History/Comorbidities:   Mr. Angela Montes  has a past medical history of Acute lumbar radiculopathy, Arthritis, Ascending aorta dilatation ([address] Atrial fibrillation with RVR ([address] Enlarged prostate, Martinez catheter in place, GERD (gastroesophageal reflux disease), High cholesterol, Hypertension, ICH (intracerebral hemorrhage) ([address] Lumbar stenosis with neurogenic claudication, Other forms of scoliosis, lumbar region, Poor historian, Psychiatric disorder, Seizures ([address] Tongue lesion, and Urinary frequency. Mr. Julieta Enriquez  has a past surgical history that includes hx other surgical; hx turp (10/20/11); hx heent (8/2012); hx orthopaedic (Right); and hx other surgical.  Social History/Living Environment:   Home Environment: Private residence  # Steps to Enter: 1(4 ft ramp for dog)  One/Two Story Residence: One story  Living Alone: Yes  Support Systems: Family member(s)  Patient Expects to be Discharged to[de-identified] Skilled nursing facility  Current DME Used/Available at Home: karthikeyan Mitchell  Prior Level of Function/Work/Activity:  Using RW after tka     Number of Personal Factors/Comorbidities that affect the Plan of Care: 3+: HIGH COMPLEXITY   EXAMINATION:   Most Recent Physical Functioning:   Gross Assessment:          RLE PROM  R Knee Flexion: 90  R Knee Extension: -8            Balance:  Sitting: Intact; Without support  Standing: Impaired; With support(walker) Bed Mobility:  Supine to Sit: Stand-by assistance  Sit to Supine: (NT)  Scooting: Contact guard assistance       Transfers:  Sit to Stand: Minimum assistance  Stand to Sit: Minimum assistance  Bed to Chair: Minimum assistance  Duration: 30 Minutes(extra time to work through activity noted)  Gait:  Right Side Weight Bearing: As tolerated  Speed/Elena: Delayed  Step Length: Left shortened;Right shortened  Stance: Right decreased  Gait Abnormalities: Antalgic;Decreased step clearance  Distance (ft): 100 Feet (ft)  Assistive Device: Walker, rolling  Ambulation - Level of Assistance: Minimal assistance  Interventions: Safety awareness training;Verbal cues      Body Structures Involved:  1. Bones  2. Joints  3. Muscles  4. Ligaments Body Functions Affected:  1. Movement Related Activities and Participation Affected:  1.  Mobility   Number of elements that affect the Plan of Care: 3: MODERATE COMPLEXITY   CLINICAL PRESENTATION:   Presentation: Stable and uncomplicated: LOW COMPLEXITY   CLINICAL DECISION MAKIN[address] [place] [id number] AM-PAC 6 Clicks   Basic Mobility Inpatient Short Form  How much difficulty does the patient currently have. .. Unable A Lot A Little None   1. Turning over in bed (including adjusting bedclothes, sheets and blankets)? [] 1   [x] 2   [] 3   [] 4   2. Sitting down on and standing up from a chair with arms ( e.g., wheelchair, bedside commode, etc.)   [] 1   [x] 2   [] 3   [] 4   3. Moving from lying on back to sitting on the side of the bed? [] 1   [x] 2   [] 3   [] 4   How much help from another person does the patient currently need. .. Total A Lot A Little None   4. Moving to and from a bed to a chair (including a wheelchair)? [] 1   [x] 2   [] 3   [] 4   5. Need to walk in hospital room? [x] 1   [] 2   [] 3   [] 4   6. Climbing 3-5 steps with a railing? [x] 1   [] 2   [] 3   [] 4   © , Trustees of [address] [place] [id number], under license to [employer]. All rights reserved      Score:  Initial: 10 Most Recent: X (Date: -- )    Interpretation of Tool:  Represents activities that are increasingly more difficult (i.e. Bed mobility, Transfers, Gait). Medical Necessity:     · Patient is expected to demonstrate progress in   · strength, range of motion, and balance  ·  to   · decrease assistance required with exercises and functional mobility  · . Reason for Services/Other Comments:  · Patient   · continues to require present interventions due to patient's inability to perform exercises and functional mobility independently  · .    Use of outcome tool(s) and clinical judgement create a POC that gives a: Questionable prediction of patient's progress: MODERATE COMPLEXITY            TREATMENT:   (In addition to Assessment/Re-Assessment sessions the following treatments were rendered)   Pre-treatment Symptoms/Complaints:  Pt agreeable  Pain: Initial:  Numeric scale  Pain Intensity 1: 3  Pain Location 1: Knee  Pain Orientation 1: Right  Pain Intervention(s) 1: Ambulation/Increased Activity, Cold pack, Exercise  Post Session: 3/10     Therapeutic Activity: (  30 Minutes(extra time to work through activity noted)) : Therapeutic activities including exercises noted below, bed mobility, transfers & progressive gait with rolling walker to improve mobility, strength and balance. Required minimal Safety awareness training;Verbal cues to promote static balance in standing. Date:  7/28 Date:  7/29 Date:     Activity/Exercise Parameters [id number] Parameters   Quad / gluteal sets 20 20    Ankle pumps 20 20    Heel slides 20 20    Hip ABD-ADD 20 20    SAQ's 20aa 20    SLR's 20aa 20    Chair slides 20 20                ACTIVITY/EXERCISE         GROUP THERAPY         Ankle Pumps         Quad Sets         Gluteal Sets         Hip ABd/ADduction         Straight Leg Raises         Knee Slides         Short Arc Quads         Long Arc Quads         Chair Slides                    B = bilateral; AA = active assistive; A = active; P = passive     Braces/Orthotics/Lines/Etc:   · IV  · arnett catheter  Treatment/Session Assessment:    · Response to Treatment: tolerated better today  · Interdisciplinary Collaboration:   o Registered Nurse  · After treatment position/precautions:   o Up in chair  o Bed/Chair-wheels locked  o Call light within reach  o RN notified   · Compliance with Program/Exercises: Will assess as treatment progresses  · Recommendations/Intent for next treatment session: \"Next visit will focus on advancements to more challenging activities and reduction in assistance provided\".   Total Treatment Duration:  PT Patient Time In/Time Out  Time In: 0800  Time Out: 0830    Jf Salvador PT

## 2020-07-29 NOTE — PROGRESS NOTES
Report called to [address] [place] [address] 107 at [facility]. Chance given to ask questions and answers provided. Pickup time at 1630.

## 2020-07-29 NOTE — PROGRESS NOTES
Care Management Interventions  PCP Verified by CM: Yes  Mode of Transport at Discharge: BLS  Transition of Care Consult (CM Consult): SNF(Acute Rehab)  [address]: Yes  Partner SNF: Yes  Physical Therapy Consult: Yes  Occupational Therapy Consult: Yes  Current Support Network: Lives Alone  Confirm Follow Up Transport: Other (see comment)(stretcher)  The Plan for Transition of Care is Related to the Following Treatment Goals : return to independent function  The Patient and/or Patient Representative was Provided with a Choice of Provider and Agrees with the Discharge Plan?: Yes  Freedom of Choice List was Provided with Basic Dialogue that Supports the Patient's Individualized Plan of Care/Goals, Treatment Preferences and Shares the Quality Data Associated with the Providers?: Yes  Discharge Location  Discharge Placement: Skilled nursing facility    Rec'd call from Nemesio Melgar with [facility]. They have rec'd insurance approval for admission today 7-29. They will need another covid test but can accept a rapid test-nursing aware. We will plan to send this afternoon once his covid test is resulted neg. Patient and his niece Kathryn notified. Patient agrees with plans,.

## 2020-07-29 NOTE — PROGRESS NOTES
Hospitalist Note     Admit Date:  7/15/2020 10:37 PM   Name:  Drew Gil   Age:  80 y.o.  :  1934   MRN:  [id number]   PCP:  Mabel Waters MD  Treatment Team: Attending Provider: Lilo Han MD; Utilization Review: Benny Grant RN; Care Manager: Jose Tijerina, [facility]; Consulting Provider: Janet Schulz MD; Care Manager: Kiersten Unger; Physical Therapist: Aimee Vizcarra PT    HPI/Subjective: Drew Gil is a 80 y.o. male with medical history significant for major depressive disorder/anxiety, neurogenic bladder with suprapubic catheter, hypertension, GERD who was admitted from home on 7/15 for confusion. Patient recently underwent right total knee arthroplasty on . Patient was found to be confused/dizzy by a friend on  then sent him to the ED. Patient was found to have A. fib RVR on arrival and met sepsis criteria. CXR is negative. She was recollected and patient was started on IV antibiotics. Ortho was consulted. Right TKA incision site appears to be healing well therefore it is unlikely the source of sepsis. There was noted to have some purulence around suprapubic catheter but culture has been negative to date. Patient appeared to develop mild volume overload and started on IV Lasix. He noted to have EMILI with elevation of creatinine on . He had episodes of fever spikes with T-max of 102.3  while on p.o. Keflex/doxycycline. Antibiotics was broadened to IV vancomycin and Zosyn and cultures were repeated. The blood cultures and urine cultures have been negative to date. Chest x-ray did not reveal any acute infiltrate. He has been saturating well on room air and completed a course of abx.      Patient seen and examined at bedside in no acute distress. Patient without complaints at this time. Patient denies any chest pain or shortness of breath. He remains hemodynamically stable and afebrile.   Denies suicidal ideation. Objective:     Patient Vitals for the past 24 hrs:   Temp Pulse Resp BP SpO2   07/29/20 1130 98.5 °F (36.9 °C) 74 18 135/63 98 %   07/29/20 0700 97.7 °F (36.5 °C) 71 18 177/85 96 %   07/29/20 0415 97.9 °F (36.6 °C) 85 18 148/78 92 %   07/29/20 0025 98 °F (36.7 °C) 75 18 172/80 90 %   07/28/20 2000 98.2 °F (36.8 °C) 82 18 159/70 95 %   07/28/20 1545 98.2 °F (36.8 °C) 80 18 151/76 94 %     Oxygen Therapy  O2 Sat (%): 98 % (07/29/20 1130)  Pulse via Oximetry: 102 beats per minute (07/23/20 0719)  O2 Device: Room air (07/24/20 0750)  O2 Flow Rate (L/min): 2 l/min (07/23/20 0719)  FIO2 (%): 28 % (07/23/20 0719)    Estimated body mass index is 31.93 kg/m² as calculated from the following:    Height as of this encounter: 6' 1\" (1.854 m). Weight as of this encounter: 109.8 kg (242 lb). Intake/Output Summary (Last 24 hours) at 7/29/2020 1139  Last data filed at 7/29/2020 1138  Gross per 24 hour   Intake 360 ml   Output 2550 ml   Net -2190 ml       *Note that automatically entered I/Os may not be accurate; dependent on patient compliance with collection and accurate  by techs. General:    Elderly male no acute distress  CV:   RRR. No murmur, rub, or gallop. Lungs:   CTAB. No wheezing, rhonchi, or rales. Abdomen:   Soft, nontender, nondistended. Extremities: Warm and dry. No cyanosis or edema. Skin:     No rashes or jaundice.    Neuro:  No gross focal deficits    Data Review:  I have reviewed all labs, meds, and studies from the last 24 hours:    Recent Results (from the past 24 hour(s))   CBC W/O DIFF    Collection Time: 07/29/20  4:21 AM   Result Value Ref Range    WBC 15.1 (H) 4.3 - 11.1 K/uL    RBC 3.45 (L) 4.23 - 5.6 M/uL    HGB 10.2 (L) 13.6 - 17.2 g/dL    HCT 33.2 (L) 41.1 - 50.3 %    MCV 96.2 79.6 - 97.8 FL    MCH 29.6 26.1 - 32.9 PG    MCHC 30.7 (L) 31.4 - 35.0 g/dL    RDW 13.9 11.9 - 14.6 %    PLATELET 796 (H) 023 - 450 K/uL    MPV 8.3 (L) 9.4 - 12.3 FL    ABSOLUTE NRBC 0. 00 0.0 - 0.2 K/uL        All Micro Results     Procedure Component Value Units Date/Time    CULTURE, BLOOD [[id number]] Collected:  20 1724    Order Status:  Completed Specimen:  Blood Updated:  20     Special Requests: --        RIGHT  Antecubital       Culture result: NO GROWTH 5 DAYS       CULTURE, BLOOD [[id number]] Collected:  20 174    Order Status:  Completed Specimen:  Blood Updated:  20     Special Requests: --        RIGHT  HAND       Culture result: NO GROWTH 5 DAYS       CULTURE, BLOOD [[id number]] Collected:  07/15/20 2249    Order Status:  Completed Specimen:  Blood Updated:  20     Special Requests: --        RIGHT  Antecubital       Culture result: NO GROWTH 5 DAYS       CULTURE, BLOOD [[id number]] Collected:  07/15/20 2334    Order Status:  Completed Specimen:  Blood Updated:  20     Special Requests: --        RIGHT  HAND       Culture result: NO GROWTH 5 DAYS       CULTURE, Daphene Escort STAIN [[id number]] Collected:  20 1213    Order Status:  Completed Specimen:  Wound from Skin Updated:  20 0730     Special Requests: SUPRAPUBIC     GRAM STAIN 1 TO 5 WBCS SEEN PER OIF      NO DEFINITE ORGANISM SEEN        Culture result: NO GROWTH 2 DAYS       CULTURE, URINE [[id number]] Collected:  07/15/20 2321    Order Status:  Completed Specimen:  Urine Updated:  20 0837     Special Requests: NO SPECIAL REQUESTS        Culture result:       <10,000 COLONIES/mL MIXED SKIN MOE ISOLATED                Results for orders placed or performed during the hospital encounter of 07/15/20   2D ECHO COMPLETE ADULT (TTE) W OR WO CONTR    Narrative    [facility] [address] [facility], [facility] [address] [facility]  [phone]    Transthoracic Echocardiogram  2D, M-mode, Doppler, and Color Doppler    Patient: Sheryle Cinnamon  MR #: [id number]  : 1934  Age: 80 years  Gender: Male  Study date: 2020  Account #: [id number]  Height: 73 in  Weight: 242.4 lb  BSA: 2.34 mï¾²  Status:Routine  Location:   BP: 117/ 76    Allergies: NO KNOWN ALLERGENS    Sonographer:  Tiffanie Coppola RDCS  Group:  [facility]  Referring Physician:  PREM Jacobs  Reading Physician:  Tony Trujillo MD    INDICATIONS: Rapid Afib. PROCEDURE: This was a routine study. A transthoracic echocardiogram was  performed. The study included complete 2D imaging, M-mode, complete spectral  Doppler, and color Doppler. Intravenous contrast (Definity) was administered. Echocardiographic views were limited by restricted patient mobility. Image  quality was adequate. LEFT VENTRICLE: Size was normal. Systolic function was normal. Ejection  fraction was estimated in the range of 55 % to 60 %. This study was   inadequate  for the evaluation of regional wall motion. Wall thickness was normal. The  study was not technically sufficient to allow evaluation of LV diastolic  function. RIGHT VENTRICLE: The size was normal. Systolic function was normal. Estimated  peak pressure was in the range of 30-35 mmHg. LEFT ATRIUM: Size was normal. LA Volume Index=19mL/m^2. RIGHT ATRIUM: Size was normal.    SYSTEMIC VEINS: IVC: The inferior vena cava was not well visualized. AORTIC VALVE: The valve was structurally normal, tri-commissural. There was   no  evidence for stenosis. There was trivial regurgitation. MITRAL VALVE: Valve structure was normal. There was no evidence for stenosis. There was no regurgitation. TRICUSPID VALVE: The valve structure was normal. There was no evidence for  stenosis. There was mild regurgitation. PULMONIC VALVE: The valve structure was normal. There was no evidence for  stenosis. There was no insufficiency. PERICARDIUM: There was no pericardial effusion. AORTA: The root exhibited mild dilatation. There was mild dilatation of the  ascending aorta.     SUMMARY:    -  Left ventricle: Systolic function was normal. Ejection fraction was  estimated in the range of 55 % to 60 %. This study was inadequate for the  evaluation of regional wall motion.    -  Tricuspid valve: There was mild regurgitation.    -  Aorta, systemic arteries: The root exhibited mild dilatation. There was   mild  dilatation of the ascending aorta. SYSTEM MEASUREMENT TABLES    2D mode  AoR Diam (2D): 3.9 cm  IVS/LVPW (2D): 1  IVSd (2D): 1.2 cm  LVIDd (2D): 4.4 cm  LVIDs (2D): 2.6 cm  LVOT Area (2D): 3.1 cm2  LVPWd (2D): 1.2 cm  RVIDd (2D): 4 cm    Unspecified Scan Mode  Peak Grad; Mean; Antegrade Flow: 6 mm[Hg]  Vmax;  Antegrade Flow: 124 cm/s  LVOT Diam: 2 cm    Prepared and signed by    Jassi Madrigal MD  Signed 21-Jul-2020 11:24:23         Current Meds:  Current Facility-Administered Medications   Medication Dose Route Frequency    bisacodyL (DULCOLAX) tablet 5 mg  5 mg Oral DAILY    bisacodyL (DULCOLAX) suppository 10 mg  10 mg Rectal DAILY PRN    acetaminophen (TYLENOL) tablet 1,000 mg  1,000 mg Oral Q6H PRN    sodium chloride (NS) flush 5-40 mL  5-40 mL IntraVENous Q8H    sodium chloride (NS) flush 5-40 mL  5-40 mL IntraVENous PRN    HYDROcodone-acetaminophen (NORCO) 5-325 mg per tablet 1 Tab  1 Tab Oral Q4H PRN    naloxone (NARCAN) injection 0.4 mg  0.4 mg IntraVENous PRN    ondansetron (ZOFRAN) injection 4 mg  4 mg IntraVENous Q4H PRN    senna-docusate (PERICOLACE) 8.6-50 mg per tablet 2 Tab  2 Tab Oral DAILY PRN    LORazepam (ATIVAN) tablet 0.5 mg  0.5 mg Oral Q4H PRN    aspirin delayed-release tablet 81 mg  81 mg Oral BID    citalopram (CELEXA) tablet 20 mg  20 mg Oral DAILY    [Held by provider] losartan (COZAAR) tablet 25 mg  25 mg Oral QHS    metoprolol succinate (TOPROL-XL) XL tablet 100 mg  100 mg Oral QHS    pantoprazole (PROTONIX) tablet 40 mg  40 mg Oral ACB    oxyCODONE IR (ROXICODONE) tablet 5 mg  5 mg Oral Q4H PRN    polyethylene glycol (MIRALAX) packet 17 g  17 g Oral DAILY    senna-docusate (PERICOLACE) 8.6-50 mg per tablet 1 Tab  1 Tab Oral DAILY    traZODone (DESYREL) tablet 100 mg  100 mg Oral QHS PRN    metoprolol (LOPRESSOR) injection 5 mg  5 mg IntraVENous Q4H PRN       Other Studies (last 24 hours):  No results found. Assessment and Plan:     Hospital Problems as of 7/29/2020 Date Reviewed: 7/13/2020          Codes Class Noted - Resolved POA    EMILI (acute kidney injury) ([facility] ICD-10-CM: N17.9  ICD-9-CM: 584.9  7/20/2020 - Present Unknown        Atrial fibrillation with RVR ([facility] ICD-10-CM: I48.91  ICD-9-CM: 427.31  7/16/2020 - Present Yes        * (Principal) Sepsis ([facility] ICD-10-CM: A41.9  ICD-9-CM: 038.9, 995.91  7/16/2020 - Present Yes        Neurogenic bladder ICD-10-CM: N31.9  ICD-9-CM: 596.54  7/16/2020 - Present Yes        Hypertension ICD-10-CM: I10  ICD-9-CM: 401.9  Unknown - Present Yes        GERD (gastroesophageal reflux disease) ICD-10-CM: K21.9  ICD-9-CM: 530.81  Unknown - Present Yes    Overview Signed 3/15/2017  2:59 PM by Ning Chau     controlled w/med                   Sepsis likely from suprapubic cath site   Met sepsis criteria secondary to leukocytosis and febrile episodes and tachycardia  All cultures have been neg to date. Febrile episode with Tmax of 102.3 on 7/22 overnight while on PO keflex/doxy since 7/18. Repeat UA on 7/21 is negative for UTI. Repeat CXR (7/22) without any new consolidation/infiltrates. Repeat blood culture(7/21) neg to date  s/p 3 days of vancomycin(7/22-7/24), doxy/keflex(7/28-7/21)  Completed course of Zosyn    Patient afebrile> 48hours  Leukocytosis persists possibly reactive to surgery    Plan:  -Monitor for Fevers  -Will need outpatient follow-up for evaluation of leukocytosis resolution       Afib RVR (resolved)  Likely due to acute illness/sepsis.  Now sinus rhythm with possible PVC or aberrant complexes  - continue with metoprolol XL 100mg qHS  - no AC for now given recent procedure      MDD/Anxiety  - continue with celexa, PRN ativan.   - per notes, he had suicidal ideation a few nights ago but he has denied any SI to me and during psych consult     Hypertension: continue with home medications  GERD: continue with PPI     Hypoxia likely due to underlying sepsis and volume overload(resolved)  EMILI(resolved)      DC planning/Dispo: Pending insurance approval      Diet:  DIET REGULAR  DVT ppx:  SCD    Signed:  Zakia Quiles MD

## 2020-07-29 NOTE — PROGRESS NOTES
Pt resting in bed and asking for his nerve p ill. Reminded him it isn't time yet as he had it at 1730. Pt  States  That he is all mixed up. Told him He could take it later. Pt verbalized he has no pain,  NV status WNL's  Incision intact with steri strips. No noted distress. Bed is in low locked position and call light within reach.

## 2020-07-30 ENCOUNTER — HOSPITAL ENCOUNTER (EMERGENCY)
Age: 85
Discharge: HOME | End: 2020-07-30
Attending: EMERGENCY MEDICINE
Payer: MEDICARE

## 2020-07-30 ENCOUNTER — HOME CARE VISIT (OUTPATIENT)
Dept: HOME HEALTH SERVICES | Facility: HOME HEALTH | Age: 85
End: 2020-07-30
Payer: MEDICARE

## 2020-07-30 ENCOUNTER — HOME HEALTH ADMISSION (OUTPATIENT)
Dept: HOME HEALTH SERVICES | Facility: HOME HEALTH | Age: 85
End: 2020-07-30
Payer: MEDICARE

## 2020-07-30 ENCOUNTER — APPOINTMENT (OUTPATIENT)
Dept: GENERAL RADIOLOGY | Age: 85
End: 2020-07-30
Attending: EMERGENCY MEDICINE
Payer: MEDICARE

## 2020-07-30 VITALS
WEIGHT: 235 LBS | HEIGHT: 72 IN | HEART RATE: 73 BPM | DIASTOLIC BLOOD PRESSURE: 65 MMHG | SYSTOLIC BLOOD PRESSURE: 110 MMHG | RESPIRATION RATE: 16 BRPM | TEMPERATURE: 98.6 F | BODY MASS INDEX: 31.83 KG/M2 | OXYGEN SATURATION: 96 %

## 2020-07-30 DIAGNOSIS — M15.9 OSTEOARTHRITIS OF MULTIPLE JOINTS, UNSPECIFIED OSTEOARTHRITIS TYPE: Primary | ICD-10-CM

## 2020-07-30 LAB
ATRIAL RATE: 293 BPM
CALCULATED R AXIS, ECG10: -19 DEGREES
CALCULATED T AXIS, ECG11: 6 DEGREES
DIAGNOSIS, 93000: NORMAL
Q-T INTERVAL, ECG07: 342 MS
QRS DURATION, ECG06: 84 MS
QTC CALCULATION (BEZET), ECG08: 473 MS
VENTRICULAR RATE, ECG03: 115 BPM

## 2020-07-30 PROCEDURE — 74011250637 HC RX REV CODE- 250/637: Performed by: EMERGENCY MEDICINE

## 2020-07-30 PROCEDURE — 3331090002 HH PPS REVENUE DEBIT

## 2020-07-30 PROCEDURE — 93005 ELECTROCARDIOGRAM TRACING: CPT | Performed by: EMERGENCY MEDICINE

## 2020-07-30 PROCEDURE — 3331090001 HH PPS REVENUE CREDIT

## 2020-07-30 PROCEDURE — 99284 EMERGENCY DEPT VISIT MOD MDM: CPT

## 2020-07-30 PROCEDURE — 71046 X-RAY EXAM CHEST 2 VIEWS: CPT

## 2020-07-30 RX ORDER — HYDROCODONE BITARTRATE AND ACETAMINOPHEN 5; 325 MG/1; MG/1
1 TABLET ORAL
Qty: 20 TAB | Refills: 0 | Status: SHIPPED | OUTPATIENT
Start: 2020-07-30 | End: 2020-08-04

## 2020-07-30 RX ORDER — HYDROCODONE BITARTRATE AND ACETAMINOPHEN 5; 325 MG/1; MG/1
1 TABLET ORAL ONCE
Status: COMPLETED | OUTPATIENT
Start: 2020-07-30 | End: 2020-07-30

## 2020-07-30 RX ADMIN — HYDROCODONE BITARTRATE AND ACETAMINOPHEN 1 TABLET: 5; 325 TABLET ORAL at 13:53

## 2020-07-30 NOTE — PROGRESS NOTES
Patient refused to go with EMS back to SNF, states he wants to go home. Daughter, granddaughter, and niece notified. Family requesting [facility] if patient refuses to return to [facility]. Referral for PT/OT/RN/SW and CNA. Family also provided private duty sitter info.      Stephanie Bustamante    [address] [facility]    * [email or web address]    ( [phone]

## 2020-07-30 NOTE — ED TRIAGE NOTES
Pt arrives to ED via EMS from [facility] in [place] for neck pain. Pt arrived wearing surgical mask. None traumatic neck and bilateral shoulder pain. Pt admitted to Rehab yesterday from [facility] after septic knee admission s/p knee replacement on 7/2. Pt no longer taking norco for pain. Pt taking mobic for knee pain.

## 2020-07-30 NOTE — DISCHARGE INSTRUCTIONS
Patient Education        Osteoarthritis: Care Instructions  Your Care Instructions     Arthritis is a common health problem in which the joints are inflamed. There are several kinds of arthritis. Osteoarthritis is caused by a breakdown of cartilage, the hard, thick tissue that cushions the joints. It causes pain, stiffness, and swelling, often in the spine, fingers, hips, and knees. Osteoarthritis can happen at any age, but it is most common in older people. Osteoarthritis never goes away completely, but it can be controlled. Medicine and home treatment can reduce the pain and prevent the arthritis from getting worse. Follow-up care is a key part of your treatment and safety. Be sure to make and go to all appointments, and call your doctor if you are having problems. It's also a good idea to know your test results and keep a list of the medicines you take. How can you care for yourself at home? · Take a warm shower or bath in the morning to relieve stiffness. Avoid sitting still afterwards. · If the joint is not swollen, use moist heat, like a warm, damp towel, for 20 to 30 minutes, 2 or 3 times a day. Do not use heat on a swollen joint. · If the joint is swollen, use ice or cold packs for 10 to 20 minutes, once an hour. Cold will help relieve pain and reduce inflammation. Put a thin cloth between the ice and your skin. · To prevent stiffness, gently move the joint through its full range of motion several times a day. · If the joint hurts, avoid activities that put a strain on it for a few days. Take rest breaks throughout the day. · Get regular exercise. Walking, swimming, yoga, biking, barry chi, and water aerobics are good exercises that are gentle on the joints. · Reach and stay at a healthy weight. If you need to lose or maintain weight, regular exercise and a healthy diet will help. Extra weight can strain the joints, especially the knees and hips, and make the pain worse.  Losing even a few pounds may help.  · Take pain medicines exactly as directed. ? If the doctor gave you a prescription medicine for pain, take it as prescribed. ? If you are not taking a prescription pain medicine, ask your doctor if you can take an over-the-counter medicine. When should you call for help? Call your doctor now or seek immediate medical care if:  · The pain is so bad that you cannot use the joint. · You have sudden back pain with weakness in your legs or loss of bowel or bladder control. · Your stools are black and tarlike or have streaks of blood. · You have severe pain and swelling in more than one joint. Watch closely for changes in your health, and be sure to contact your doctor if:  · You have side effects from the medicines, like belly pain, ongoing heartburn, or nausea. · Joint pain continues for more than 6 weeks, and home treatment is not helping. Where can you learn more? Go to http://rafael-maribell.info/  Enter U459 in the search box to learn more about \"Osteoarthritis: Care Instructions. \"  Current as of: December 9, 2019               Content Version: 12.5  © 9337-4604 Healthwise, Incorporated. Care instructions adapted under license by [employer] (which disclaims liability or warranty for this information). If you have questions about a medical condition or this instruction, always ask your healthcare professional. [address] any warranty or liability for your use of this information.

## 2020-07-30 NOTE — ED NOTES
I have reviewed discharge instructions with the patient. The patient verbalized understanding. Patient left ED via Discharge Method: stretcher to [facility] with Dahlia. Opportunity for questions and clarification provided. Patient given 1 scripts. To continue your aftercare when you leave the hospital, you may receive an automated call from our care team to check in on how you are doing. This is a free service and part of our promise to provide the best care and service to meet your aftercare needs.  If you have questions, or wish to unsubscribe from this service please call [phone]. Thank you for Choosing our [employer] [facility] Emergency Department.

## 2020-07-30 NOTE — PROGRESS NOTES
Patient from [facility] for [address]. Patient discussed with ale Peralta at the bedside. Plan is for the patient to return to rehab at discharge.      Pepe Silveira, [address] [facility]    [address] [facility]    * [email or web address]    ( [phone]

## 2020-07-30 NOTE — ED NOTES
Pt now refuses to go back to the Rehab. His cousin Kathryn notified of same at this time. Report to Dion Trujillo RN.
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
## 2020-07-31 LAB
SARS COV-2, XPGCVT: NEGATIVE
SOURCE, COVRS: NORMAL

## 2020-08-01 ENCOUNTER — HOME CARE VISIT (OUTPATIENT)
Dept: SCHEDULING | Facility: HOME HEALTH | Age: 85
End: 2020-08-01
Payer: MEDICARE

## 2020-08-01 VITALS
TEMPERATURE: 97.3 F | HEART RATE: 87 BPM | SYSTOLIC BLOOD PRESSURE: 102 MMHG | OXYGEN SATURATION: 97 % | DIASTOLIC BLOOD PRESSURE: 55 MMHG | RESPIRATION RATE: 16 BRPM

## 2020-08-01 PROCEDURE — 3331090001 HH PPS REVENUE CREDIT

## 2020-08-01 PROCEDURE — 3331090002 HH PPS REVENUE DEBIT

## 2020-08-01 PROCEDURE — G0299 HHS/HOSPICE OF RN EA 15 MIN: HCPCS

## 2020-08-01 PROCEDURE — 400013 HH SOC

## 2020-08-02 PROCEDURE — 3331090002 HH PPS REVENUE DEBIT

## 2020-08-02 PROCEDURE — 3331090001 HH PPS REVENUE CREDIT

## 2020-08-03 PROCEDURE — 3331090001 HH PPS REVENUE CREDIT

## 2020-08-03 PROCEDURE — 3331090002 HH PPS REVENUE DEBIT

## 2020-08-04 ENCOUNTER — HOME CARE VISIT (OUTPATIENT)
Dept: SCHEDULING | Facility: HOME HEALTH | Age: 85
End: 2020-08-04
Payer: MEDICARE

## 2020-08-04 VITALS
HEART RATE: 78 BPM | TEMPERATURE: 98.3 F | SYSTOLIC BLOOD PRESSURE: 122 MMHG | RESPIRATION RATE: 18 BRPM | DIASTOLIC BLOOD PRESSURE: 68 MMHG | OXYGEN SATURATION: 96 %

## 2020-08-04 PROCEDURE — 3331090001 HH PPS REVENUE CREDIT

## 2020-08-04 PROCEDURE — A4358 URINARY LEG OR ABDOMEN BAG: HCPCS

## 2020-08-04 PROCEDURE — 3331090002 HH PPS REVENUE DEBIT

## 2020-08-04 PROCEDURE — G0299 HHS/HOSPICE OF RN EA 15 MIN: HCPCS

## 2020-08-05 ENCOUNTER — HOME CARE VISIT (OUTPATIENT)
Dept: SCHEDULING | Facility: HOME HEALTH | Age: 85
End: 2020-08-05
Payer: MEDICARE

## 2020-08-05 VITALS
OXYGEN SATURATION: 94 % | HEART RATE: 88 BPM | SYSTOLIC BLOOD PRESSURE: 110 MMHG | TEMPERATURE: 97.9 F | RESPIRATION RATE: 18 BRPM | DIASTOLIC BLOOD PRESSURE: 60 MMHG

## 2020-08-05 PROCEDURE — 3331090002 HH PPS REVENUE DEBIT

## 2020-08-05 PROCEDURE — 3331090001 HH PPS REVENUE CREDIT

## 2020-08-05 PROCEDURE — G0152 HHCP-SERV OF OT,EA 15 MIN: HCPCS

## 2020-08-06 PROCEDURE — 3331090002 HH PPS REVENUE DEBIT

## 2020-08-06 PROCEDURE — 3331090001 HH PPS REVENUE CREDIT

## 2020-08-07 ENCOUNTER — HOME CARE VISIT (OUTPATIENT)
Dept: SCHEDULING | Facility: HOME HEALTH | Age: 85
End: 2020-08-07
Payer: MEDICARE

## 2020-08-07 PROCEDURE — G0299 HHS/HOSPICE OF RN EA 15 MIN: HCPCS

## 2020-08-07 PROCEDURE — 3331090001 HH PPS REVENUE CREDIT

## 2020-08-07 PROCEDURE — 3331090002 HH PPS REVENUE DEBIT

## 2020-08-09 VITALS
SYSTOLIC BLOOD PRESSURE: 110 MMHG | HEART RATE: 76 BPM | TEMPERATURE: 97.3 F | RESPIRATION RATE: 18 BRPM | OXYGEN SATURATION: 98 % | DIASTOLIC BLOOD PRESSURE: 62 MMHG

## 2021-09-12 PROBLEM — F33.0 MILD EPISODE OF RECURRENT MAJOR DEPRESSIVE DISORDER (HCC): Status: ACTIVE | Noted: 2021-09-12

## 2021-09-12 PROBLEM — F51.01 PRIMARY INSOMNIA: Status: ACTIVE | Noted: 2021-09-12

## 2021-09-12 PROBLEM — L97.319 VENOUS ULCER OF ANKLE, RIGHT (HCC): Status: ACTIVE | Noted: 2019-07-22

## 2021-09-12 PROBLEM — Z28.21 COVID-19 VACCINATION REFUSED: Status: ACTIVE | Noted: 2021-09-12

## 2021-09-12 PROBLEM — I61.8 OTHER NONTRAUMATIC INTRACEREBRAL HEMORRHAGE (HCC): Status: ACTIVE | Noted: 2020-05-01

## 2021-09-12 PROBLEM — N18.31 STAGE 3A CHRONIC KIDNEY DISEASE (HCC): Status: ACTIVE | Noted: 2021-09-12

## 2021-09-12 PROBLEM — I83.013 VENOUS ULCER OF ANKLE, RIGHT (HCC): Status: ACTIVE | Noted: 2019-07-22

## 2021-09-12 PROBLEM — M41.80 LEVOSCOLIOSIS: Status: ACTIVE | Noted: 2018-05-21

## 2021-09-12 PROBLEM — I77.810 ASCENDING AORTA DILATATION (HCC): Status: ACTIVE | Noted: 2020-05-15

## 2021-09-12 PROBLEM — Z79.899 HIGH RISK MEDICATION USE: Status: ACTIVE | Noted: 2021-09-12

## 2021-09-12 PROBLEM — M19.011 ARTHRITIS OF RIGHT SHOULDER REGION: Status: ACTIVE | Noted: 2019-09-24

## 2021-09-12 PROBLEM — Z87.39 HISTORY OF RHABDOMYOLYSIS: Status: ACTIVE | Noted: 2021-09-12

## 2021-09-12 PROBLEM — F41.9 ANXIETY: Status: ACTIVE | Noted: 2020-05-01

## 2021-09-12 PROBLEM — H91.90 HEARING LOSS: Status: ACTIVE | Noted: 2021-09-12

## 2021-09-12 PROBLEM — F41.1 GENERALIZED ANXIETY DISORDER: Status: ACTIVE | Noted: 2020-05-01

## 2021-09-12 PROBLEM — Z86.79 HISTORY OF ATRIAL FIBRILLATION: Status: ACTIVE | Noted: 2020-07-16

## 2021-09-12 PROBLEM — Z79.899 HIGH RISK MEDICATION USE: Chronic | Status: ACTIVE | Noted: 2021-09-12

## 2021-09-12 PROBLEM — K21.9 GERD (GASTROESOPHAGEAL REFLUX DISEASE): Status: ACTIVE | Noted: 2018-05-31

## 2021-09-12 PROBLEM — N17.9 AKI (ACUTE KIDNEY INJURY) (HCC): Status: RESOLVED | Noted: 2020-07-20 | Resolved: 2021-09-12

## 2021-09-12 PROBLEM — A41.9 SEPSIS (HCC): Status: RESOLVED | Noted: 2020-07-16 | Resolved: 2021-09-12

## 2021-09-12 PROBLEM — Z93.59 SUPRAPUBIC CATHETER (HCC): Status: ACTIVE | Noted: 2021-09-12

## 2021-11-14 PROBLEM — L97.319 VENOUS ULCER OF ANKLE, RIGHT (HCC): Status: RESOLVED | Noted: 2019-07-22 | Resolved: 2021-11-14

## 2021-11-14 PROBLEM — Z28.21 REFUSED VARICELLA VACCINE: Status: ACTIVE | Noted: 2021-11-14

## 2021-11-14 PROBLEM — Z93.59 SUPRAPUBIC CATHETER (HCC): Status: RESOLVED | Noted: 2021-09-12 | Resolved: 2021-11-14

## 2021-11-14 PROBLEM — I83.013 VENOUS ULCER OF ANKLE, RIGHT (HCC): Status: RESOLVED | Noted: 2019-07-22 | Resolved: 2021-11-14

## 2021-11-14 PROBLEM — Z97.8 CHRONIC INDWELLING FOLEY CATHETER: Status: ACTIVE | Noted: 2021-11-14

## 2021-11-14 PROBLEM — Z28.21 REFUSED INFLUENZA VACCINE: Status: ACTIVE | Noted: 2021-11-14

## 2022-02-22 NOTE — PROGRESS NOTES
Hematology referral ordered for patient for iron deficiency. Message left for patient to return call to office to discuss recently labs. Will discuss with patient when she returns call. Will instruct her to start taking 325 mg OTC iron PO. Problem: Pressure Injury - Risk of  Goal: *Prevention of pressure injury  Description: Document Osvaldo Scale and appropriate interventions in the flowsheet. Outcome: Progressing Towards Goal  Note: Pressure Injury Interventions:  Sensory Interventions: Assess changes in LOC    Moisture Interventions: Absorbent underpads, Apply protective barrier, creams and emollients    Activity Interventions: Increase time out of bed    Mobility Interventions: Pressure redistribution bed/mattress (bed type)    Nutrition Interventions: Offer support with meals,snacks and hydration    Friction and Shear Interventions: Apply protective barrier, creams and emollients, Feet elevated on foot rest, Minimize layers                Problem: Falls - Risk of  Goal: *Absence of Falls  Description: Document Angie Fall Risk and appropriate interventions in the flowsheet.   Outcome: Progressing Towards Goal  Note: Fall Risk Interventions:  Mobility Interventions: Bed/chair exit alarm    Mentation Interventions: Bed/chair exit alarm    Medication Interventions: Bed/chair exit alarm    Elimination Interventions: Call light in reach    History of Falls Interventions: Bed/chair exit alarm

## 2022-03-18 PROBLEM — Z28.21 COVID-19 VACCINATION REFUSED: Status: ACTIVE | Noted: 2021-09-12

## 2022-03-18 PROBLEM — M19.011 ARTHRITIS OF RIGHT SHOULDER REGION: Status: ACTIVE | Noted: 2019-09-24

## 2022-03-18 PROBLEM — K21.9 GASTROESOPHAGEAL REFLUX DISEASE WITHOUT ESOPHAGITIS: Status: ACTIVE | Noted: 2018-05-31

## 2022-03-18 PROBLEM — F51.01 PRIMARY INSOMNIA: Status: ACTIVE | Noted: 2021-09-12

## 2022-03-18 PROBLEM — M41.80 LEVOSCOLIOSIS: Status: ACTIVE | Noted: 2018-05-21

## 2022-03-18 PROBLEM — M17.11 OSTEOARTHRITIS OF RIGHT KNEE: Status: ACTIVE | Noted: 2020-07-13

## 2022-03-18 PROBLEM — F41.1 GENERALIZED ANXIETY DISORDER: Status: ACTIVE | Noted: 2020-05-01

## 2022-03-19 PROBLEM — N31.9 NEUROGENIC BLADDER: Status: ACTIVE | Noted: 2020-07-16

## 2022-03-19 PROBLEM — H91.90 HEARING LOSS: Status: ACTIVE | Noted: 2021-09-12

## 2022-03-19 PROBLEM — N18.31 STAGE 3A CHRONIC KIDNEY DISEASE (HCC): Status: ACTIVE | Noted: 2021-09-12

## 2022-03-19 PROBLEM — Z79.899 HIGH RISK MEDICATION USE: Status: ACTIVE | Noted: 2021-09-12

## 2022-03-19 PROBLEM — Z28.21 REFUSED VARICELLA VACCINE: Status: ACTIVE | Noted: 2021-11-14

## 2022-03-19 PROBLEM — Z87.39 HISTORY OF RHABDOMYOLYSIS: Status: ACTIVE | Noted: 2021-09-12

## 2022-03-19 PROBLEM — Z86.79 HISTORY OF ATRIAL FIBRILLATION: Status: ACTIVE | Noted: 2020-07-16

## 2022-03-19 PROBLEM — Z28.21 REFUSED INFLUENZA VACCINE: Status: ACTIVE | Noted: 2021-11-14

## 2022-03-19 PROBLEM — I77.810 ASCENDING AORTA DILATATION (HCC): Status: ACTIVE | Noted: 2020-05-15

## 2022-03-20 PROBLEM — Z97.8 CHRONIC INDWELLING FOLEY CATHETER: Status: ACTIVE | Noted: 2021-11-14

## 2022-03-20 PROBLEM — I61.8 OTHER NONTRAUMATIC INTRACEREBRAL HEMORRHAGE (HCC): Status: ACTIVE | Noted: 2020-05-01

## 2022-03-20 PROBLEM — F33.0 MILD EPISODE OF RECURRENT MAJOR DEPRESSIVE DISORDER (HCC): Status: ACTIVE | Noted: 2021-09-12

## 2022-05-06 ENCOUNTER — TRANSCRIBE ORDER (OUTPATIENT)
Dept: SCHEDULING | Age: 87
End: 2022-05-06

## 2022-05-06 DIAGNOSIS — G95.19 NEUROGENIC CLAUDICATION (HCC): Primary | ICD-10-CM

## 2022-06-30 ENCOUNTER — OFFICE VISIT (OUTPATIENT)
Dept: FAMILY MEDICINE CLINIC | Facility: CLINIC | Age: 87
End: 2022-06-30
Payer: MEDICARE

## 2022-06-30 VITALS
HEIGHT: 72 IN | WEIGHT: 244 LBS | OXYGEN SATURATION: 95 % | BODY MASS INDEX: 33.05 KG/M2 | DIASTOLIC BLOOD PRESSURE: 72 MMHG | HEART RATE: 98 BPM | SYSTOLIC BLOOD PRESSURE: 139 MMHG

## 2022-06-30 DIAGNOSIS — I10 BENIGN ESSENTIAL HTN: Primary | ICD-10-CM

## 2022-06-30 DIAGNOSIS — F99 PSYCHIATRIC DISORDER: ICD-10-CM

## 2022-06-30 DIAGNOSIS — I77.810 ASCENDING AORTA DILATATION (HCC): ICD-10-CM

## 2022-06-30 DIAGNOSIS — K21.9 GASTROESOPHAGEAL REFLUX DISEASE WITHOUT ESOPHAGITIS: ICD-10-CM

## 2022-06-30 DIAGNOSIS — Z79.899 HIGH RISK MEDICATION USE: ICD-10-CM

## 2022-06-30 DIAGNOSIS — E66.09 CLASS 1 OBESITY DUE TO EXCESS CALORIES WITHOUT SERIOUS COMORBIDITY WITH BODY MASS INDEX (BMI) OF 33.0 TO 33.9 IN ADULT: ICD-10-CM

## 2022-06-30 DIAGNOSIS — I61.8 OTHER NONTRAUMATIC INTRACEREBRAL HEMORRHAGE, UNSPECIFIED LATERALITY (HCC): ICD-10-CM

## 2022-06-30 DIAGNOSIS — F33.0 MILD EPISODE OF RECURRENT MAJOR DEPRESSIVE DISORDER (HCC): ICD-10-CM

## 2022-06-30 DIAGNOSIS — N18.31 STAGE 3A CHRONIC KIDNEY DISEASE (HCC): ICD-10-CM

## 2022-06-30 DIAGNOSIS — N31.9 NEUROGENIC BLADDER: ICD-10-CM

## 2022-06-30 PROCEDURE — 99214 OFFICE O/P EST MOD 30 MIN: CPT | Performed by: FAMILY MEDICINE

## 2022-06-30 PROCEDURE — 1123F ACP DISCUSS/DSCN MKR DOCD: CPT | Performed by: FAMILY MEDICINE

## 2022-06-30 RX ORDER — OMEGA-3 FATTY ACIDS/FISH OIL 300-1000MG
1 CAPSULE ORAL
COMMUNITY

## 2022-06-30 ASSESSMENT — ENCOUNTER SYMPTOMS
CHEST TIGHTNESS: 0
BACK PAIN: 0
ALLERGIC/IMMUNOLOGIC NEGATIVE: 1
CONSTIPATION: 0
WHEEZING: 0
DIARRHEA: 0
ABDOMINAL PAIN: 0
EYES NEGATIVE: 1
VOMITING: 0
SHORTNESS OF BREATH: 0
NAUSEA: 0
COUGH: 0

## 2022-06-30 NOTE — PROGRESS NOTES
Kaia Barcenas DO                Diplomate of the [employer] of [facility] [place]         [phone]    Ting Hernandez is a 80 y.o. male who was seen on 6/30/2022 for   Chief Complaint   Patient presents with    Hypertension       Assessment & Plan     Diagnosis Orders   1. Benign essential HTN  Comprehensive Metabolic Panel    Well-controlled      2. Stage 3a chronic kidney disease ([address]  Comprehensive Metabolic Panel    Hydrate well. Avoid NSAIDs. 3. Class 1 obesity due to excess calories without serious comorbidity with body mass index (BMI) of 33.0 to 33.9 in adult      Low carb diet advised/reviewed. Information given. 4. Gastroesophageal reflux disease without esophagitis      Stable with current medication      5. Ascending aorta dilatation (HCC)      Follows with cardiology      6. Mild episode of recurrent major depressive disorder (HCC)      Stable. Continue with citalopram      7. Psychiatric disorder      Etiology uncertain. On Keppra. 8. Neurogenic bladder      Follows with urology      9. Other nontraumatic intracerebral hemorrhage, unspecified laterality ([address]        10. High risk medication use  Comprehensive Metabolic Panel          No problem-specific Assessment & Plan notes found for this encounter. Follow-up and Dispositions    Return in about 9 months (around 4/1/2023) for NO ESPERANZA. RECENT LABS/TESTS TO REVIEW and DISCUSS    No results found for this visit on 06/30/22. Subjective    HPI: Please see my assessment and plan notes (above) for individual problems addressed today. Other important information: This is an 70-year-old male patient who presents today for refills on medications. He apparently ran into trouble getting refills while I was on my leave of absence. A review of records indicates that he is not yet due for any of his prescription refills.   They should be available at the pharmacy. The patient offers no complaints today. Reviewed and updated this visit by provider:           Review of Systems   Constitutional:  Negative for fatigue and fever. HENT: Negative. Eyes: Negative. Respiratory:  Negative for cough, chest tightness, shortness of breath and wheezing. Cardiovascular:  Negative for chest pain and leg swelling. Gastrointestinal:  Negative for abdominal pain, constipation, diarrhea, nausea and vomiting. Endocrine: Negative. Genitourinary:  Negative for difficulty urinating, dysuria and frequency. Musculoskeletal:  Negative for arthralgias, back pain and neck pain. Skin:  Negative for rash. Allergic/Immunologic: Negative. Neurological:  Negative for dizziness and light-headedness. Psychiatric/Behavioral:  Negative for dysphoric mood. The patient is not nervous/anxious. All other systems reviewed and are negative. Objective    /72 (Site: Left Upper Arm, Position: Sitting, Cuff Size: Medium Adult)   Pulse 98   Ht 6' (1.829 m)   Wt 244 lb (110.7 kg)   SpO2 95%   BMI 33.09 kg/m²     Physical Exam  Vitals reviewed. Constitutional:       General: He is not in acute distress. Appearance: Normal appearance. HENT:      Head: Normocephalic and atraumatic. Right Ear: Tympanic membrane, ear canal and external ear normal.      Left Ear: Tympanic membrane, ear canal and external ear normal.      Mouth/Throat:      Mouth: Mucous membranes are moist.   Eyes:      Extraocular Movements: Extraocular movements intact. Conjunctiva/sclera: Conjunctivae normal.      Pupils: Pupils are equal, round, and reactive to light. Neck:      Vascular: No carotid bruit. Cardiovascular:      Rate and Rhythm: Normal rate and regular rhythm. Heart sounds: Normal heart sounds. Pulmonary:      Effort: Pulmonary effort is normal.      Breath sounds: Normal breath sounds.    Abdominal:      General: Bowel sounds are normal. Palpations: Abdomen is soft. Musculoskeletal:      Cervical back: Neck supple. No rigidity or tenderness. Lymphadenopathy:      Cervical: No cervical adenopathy. Skin:     General: Skin is warm and dry. Neurological:      General: No focal deficit present. Mental Status: He is alert and oriented to person, place, and time. Psychiatric:         Mood and Affect: Mood normal.         Behavior: Behavior normal.         Thought Content: Thought content normal.         Judgment: Judgment normal.       On this date 06/30/2022 I have spent 32 minutes reviewing previous notes, lab/imaging results and face to face with the patient discussing the diagnoses and importance of compliance with the treatment plan, as well as documenting on the day of the visit.         DO Lora Bonilla [facility] [place]

## 2022-07-17 PROBLEM — E66.09 CLASS 1 OBESITY DUE TO EXCESS CALORIES WITHOUT SERIOUS COMORBIDITY WITH BODY MASS INDEX (BMI) OF 33.0 TO 33.9 IN ADULT: Status: ACTIVE | Noted: 2022-07-17

## 2022-08-16 NOTE — TELEPHONE ENCOUNTER
Patient request refill on levETIRAcetam (KEPPRA) 1000 MG tablet, LORazepam (ATIVAN) 0.5 MG tablet and traZODone (DESYREL) 100 MG tablet to [facility] [place]

## 2022-08-17 RX ORDER — LEVETIRACETAM 500 MG/1
500 TABLET ORAL 2 TIMES DAILY
Qty: 180 TABLET | Refills: 3 | Status: SHIPPED | OUTPATIENT
Start: 2022-08-17 | End: 2022-08-29 | Stop reason: SDUPTHER

## 2022-08-17 RX ORDER — TRAZODONE HYDROCHLORIDE 100 MG/1
100 TABLET ORAL NIGHTLY
Qty: 90 TABLET | Refills: 3 | Status: SHIPPED | OUTPATIENT
Start: 2022-08-17

## 2022-08-22 RX ORDER — LEVETIRACETAM 1000 MG/1
TABLET ORAL
Qty: 90 TABLET | Refills: 0 | OUTPATIENT
Start: 2022-08-22

## 2022-08-23 RX ORDER — LEVETIRACETAM 1000 MG/1
TABLET ORAL
Qty: 90 TABLET | Refills: 0 | OUTPATIENT
Start: 2022-08-23

## 2022-08-26 ENCOUNTER — TELEPHONE (OUTPATIENT)
Dept: FAMILY MEDICINE CLINIC | Facility: CLINIC | Age: 87
End: 2022-08-26

## 2022-08-26 NOTE — TELEPHONE ENCOUNTER
Patient called back in stated he is out of his medicine and has missed two doses. Patient is wanting to be notified when medicine is sent to [facility].

## 2022-08-26 NOTE — TELEPHONE ENCOUNTER
Pt called and stated [facility] never received his rx for Keppra. He was able to  Trazodone but not he other.

## 2022-08-29 ENCOUNTER — TELEPHONE (OUTPATIENT)
Dept: FAMILY MEDICINE CLINIC | Facility: CLINIC | Age: 87
End: 2022-08-29

## 2022-08-29 DIAGNOSIS — F99 PSYCHIATRIC DISORDER: Primary | ICD-10-CM

## 2022-08-29 RX ORDER — LEVETIRACETAM 1000 MG/1
TABLET ORAL
Qty: 90 TABLET | Refills: 0 | OUTPATIENT
Start: 2022-08-29

## 2022-08-29 RX ORDER — LEVETIRACETAM 500 MG/1
500 TABLET ORAL 2 TIMES DAILY
Qty: 180 TABLET | Refills: 3 | Status: SHIPPED | OUTPATIENT
Start: 2022-08-29

## 2022-08-29 NOTE — TELEPHONE ENCOUNTER
Confirmed with Pharmacy that they did not get these prescriptions. He needs Keppra 500 mg sent to [facility] in [place]. Would you be willing to call this in for him?

## 2022-08-30 NOTE — TELEPHONE ENCOUNTER
Pharm called, spoke with pharm, stated the [facility] received has two directions, asking for new RX to be sent with correct directions

## 2022-09-21 RX ORDER — OMEPRAZOLE 40 MG/1
40 CAPSULE, DELAYED RELEASE ORAL DAILY
Qty: 90 CAPSULE | Refills: 3 | Status: SHIPPED | OUTPATIENT
Start: 2022-09-21

## 2022-10-04 RX ORDER — METOPROLOL SUCCINATE 100 MG/1
100 TABLET, EXTENDED RELEASE ORAL DAILY
Qty: 90 TABLET | Refills: 3 | Status: SHIPPED | OUTPATIENT
Start: 2022-10-04

## 2022-11-08 DIAGNOSIS — F33.0 MILD EPISODE OF RECURRENT MAJOR DEPRESSIVE DISORDER (HCC): Primary | ICD-10-CM

## 2022-11-08 RX ORDER — CITALOPRAM 20 MG/1
20 TABLET ORAL DAILY
Qty: 90 TABLET | Refills: 3 | Status: SHIPPED | OUTPATIENT
Start: 2022-11-08

## 2022-11-09 RX ORDER — TRAZODONE HYDROCHLORIDE 100 MG/1
100 TABLET ORAL NIGHTLY
Qty: 90 TABLET | Refills: 0 | OUTPATIENT
Start: 2022-11-09

## 2022-11-21 RX ORDER — LOSARTAN POTASSIUM 25 MG/1
25 TABLET ORAL DAILY
Qty: 90 TABLET | Refills: 3 | Status: SHIPPED | OUTPATIENT
Start: 2022-11-21

## 2022-12-20 ENCOUNTER — TELEPHONE (OUTPATIENT)
Dept: FAMILY MEDICINE CLINIC | Facility: CLINIC | Age: 87
End: 2022-12-20

## 2022-12-20 RX ORDER — OMEPRAZOLE 40 MG/1
CAPSULE, DELAYED RELEASE ORAL
Qty: 90 CAPSULE | Refills: 0 | OUTPATIENT
Start: 2022-12-20

## 2022-12-20 NOTE — TELEPHONE ENCOUNTER
Patient left message requesting refill for Omeprazole    Returned call , no answer, left message  via voice mail that Omeprazole RX was sent to [facility] [place] # 90 RF X 3  9/21/2022, patient to call [facility] [place] for refill

## 2023-02-28 DIAGNOSIS — F99 PSYCHIATRIC DISORDER: ICD-10-CM

## 2023-02-28 RX ORDER — LEVETIRACETAM 1000 MG/1
TABLET ORAL
Qty: 90 TABLET | Refills: 0 | OUTPATIENT
Start: 2023-02-28

## 2023-03-01 RX ORDER — LEVETIRACETAM 500 MG/1
500 TABLET ORAL 2 TIMES DAILY
Qty: 180 TABLET | Refills: 3 | OUTPATIENT
Start: 2023-03-01

## 2023-03-02 ENCOUNTER — TELEPHONE (OUTPATIENT)
Dept: FAMILY MEDICINE CLINIC | Facility: CLINIC | Age: 88
End: 2023-03-02

## 2023-03-02 RX ORDER — LEVETIRACETAM 1000 MG/1
TABLET ORAL
Qty: 90 TABLET | Refills: 0 | OUTPATIENT
Start: 2023-03-02

## 2023-03-02 NOTE — TELEPHONE ENCOUNTER
Spoke with patient he was informed this RX was sent by Dr Jose Darby to [facility], gave phone number to pharmacy for pt to receive refills, also was informed if he wanted pharmacy in [place] he only had to call and transfer, the refills patient voiced understanding

## 2023-03-06 RX ORDER — LEVETIRACETAM 1000 MG/1
TABLET ORAL
Qty: 90 TABLET | Refills: 0 | OUTPATIENT
Start: 2023-03-06

## 2023-03-07 DIAGNOSIS — F99 PSYCHIATRIC DISORDER: ICD-10-CM

## 2023-03-07 NOTE — TELEPHONE ENCOUNTER
Patient called requesting refill for Keppra 1/2 tab bid to [facility] [place]    I called [facility], this is where the original RX was sent, was told by pharmacist, patient had transferred this RX to [place] [address] and has no refills there,called and spoke with [facility] pharmacist, stated last refill was # 90 11/29/2022 and patient has no refills remaining, needs new RX

## 2023-03-08 RX ORDER — LEVETIRACETAM 500 MG/1
TABLET ORAL
Qty: 90 TABLET | Refills: 3 | Status: SHIPPED | OUTPATIENT
Start: 2023-03-08

## 2023-03-14 ENCOUNTER — TELEPHONE (OUTPATIENT)
Dept: FAMILY MEDICINE CLINIC | Facility: CLINIC | Age: 88
End: 2023-03-14

## 2023-03-14 NOTE — TELEPHONE ENCOUNTER
Patient left message requesting call back at # [phone] and spoke with patient, stated was seen  at [facility] ER last week, was given new cath and 8 days of antibiotic, stated urine is leaking from cath, having to wear a diaper, said he did call Yos[facility] Murphy, was told to call his PCP,   Discussed with Dr Silke Shields, verbal order to have patient call his urologist  Explaiend to patient as per Dr Silke Shields to call his urologist, gave phone number to his urologist, patient voiced understanding and will contact their office

## 2023-03-21 ENCOUNTER — TELEPHONE (OUTPATIENT)
Dept: FAMILY MEDICINE CLINIC | Facility: CLINIC | Age: 88
End: 2023-03-21

## 2023-03-21 NOTE — TELEPHONE ENCOUNTER
Patient left message requesting Omeprazole refill to [facility] [place]    Per chart,  not due, Rx was sent to [facility] [place] # 90 RF X 3 9/21/2022    Called and spoke with pharm.  Confirmed patient has refills remaining    Called patient to inform, no answer, left message via voice mail to contact [facility] for his Omeprazole refills

## 2023-04-03 PROBLEM — F41.1 GENERALIZED ANXIETY DISORDER: Chronic | Status: ACTIVE | Noted: 2020-05-01

## 2023-04-03 PROBLEM — M41.9 SCOLIOSIS OF LUMBAR SPINE: Status: ACTIVE | Noted: 2023-04-03

## 2023-04-03 PROBLEM — M47.816 SPONDYLOSIS OF LUMBAR REGION WITHOUT MYELOPATHY OR RADICULOPATHY: Status: ACTIVE | Noted: 2018-05-21

## 2023-04-03 PROBLEM — K21.9 GASTROESOPHAGEAL REFLUX DISEASE WITHOUT ESOPHAGITIS: Chronic | Status: ACTIVE | Noted: 2018-05-31

## 2023-04-03 PROBLEM — Z97.8 CHRONIC INDWELLING FOLEY CATHETER: Chronic | Status: ACTIVE | Noted: 2021-11-14

## 2023-04-03 PROBLEM — M47.816 FACET ARTHROPATHY, LUMBAR: Status: ACTIVE | Noted: 2018-05-21

## 2023-04-03 PROBLEM — E66.09 CLASS 1 OBESITY DUE TO EXCESS CALORIES WITHOUT SERIOUS COMORBIDITY WITH BODY MASS INDEX (BMI) OF 33.0 TO 33.9 IN ADULT: Chronic | Status: ACTIVE | Noted: 2022-07-17

## 2023-04-03 PROBLEM — N18.31 STAGE 3A CHRONIC KIDNEY DISEASE (HCC): Chronic | Status: ACTIVE | Noted: 2021-09-12

## 2023-04-03 PROBLEM — F33.0 MILD EPISODE OF RECURRENT MAJOR DEPRESSIVE DISORDER (HCC): Chronic | Status: ACTIVE | Noted: 2021-09-12

## 2023-05-10 ENCOUNTER — TELEPHONE (OUTPATIENT)
Dept: FAMILY MEDICINE CLINIC | Facility: CLINIC | Age: 88
End: 2023-05-10

## 2023-05-10 NOTE — TELEPHONE ENCOUNTER
Received message from [facility] with [employer], she saw patient today, stated patient with crackle in left lower lobe, no SOB, no fever no cough, suggested he see PCP    Called and spoke with patient, appointment scheduled for tomorrow 5/11/2023 with DR Ava Damon , and for ER follow up,patient confirmed

## 2023-05-11 ENCOUNTER — OFFICE VISIT (OUTPATIENT)
Dept: FAMILY MEDICINE CLINIC | Facility: CLINIC | Age: 88
End: 2023-05-11
Payer: MEDICARE

## 2023-05-11 VITALS
DIASTOLIC BLOOD PRESSURE: 62 MMHG | BODY MASS INDEX: 32.64 KG/M2 | OXYGEN SATURATION: 95 % | SYSTOLIC BLOOD PRESSURE: 120 MMHG | HEART RATE: 100 BPM | WEIGHT: 241 LBS | HEIGHT: 72 IN

## 2023-05-11 DIAGNOSIS — F41.1 GENERALIZED ANXIETY DISORDER: Chronic | ICD-10-CM

## 2023-05-11 DIAGNOSIS — E66.09 CLASS 1 OBESITY DUE TO EXCESS CALORIES WITHOUT SERIOUS COMORBIDITY WITH BODY MASS INDEX (BMI) OF 32.0 TO 32.9 IN ADULT: Chronic | ICD-10-CM

## 2023-05-11 DIAGNOSIS — F33.0 MILD EPISODE OF RECURRENT MAJOR DEPRESSIVE DISORDER (HCC): Chronic | ICD-10-CM

## 2023-05-11 DIAGNOSIS — M79.18 LEFT BUTTOCK PAIN: Primary | ICD-10-CM

## 2023-05-11 DIAGNOSIS — I10 BENIGN ESSENTIAL HTN: Chronic | ICD-10-CM

## 2023-05-11 DIAGNOSIS — Z97.8 CHRONIC INDWELLING FOLEY CATHETER: Chronic | ICD-10-CM

## 2023-05-11 PROBLEM — M48.062 SPINAL STENOSIS OF LUMBAR REGION WITH NEUROGENIC CLAUDICATION: Status: ACTIVE | Noted: 2018-05-21

## 2023-05-11 PROBLEM — R26.2 DISABILITY OF WALKING: Status: ACTIVE | Noted: 2023-05-11

## 2023-05-11 PROBLEM — M25.519 SHOULDER JOINT PAIN: Status: ACTIVE | Noted: 2023-05-11

## 2023-05-11 PROBLEM — M50.30 DEGENERATION OF CERVICAL INTERVERTEBRAL DISC: Status: ACTIVE | Noted: 2023-05-11

## 2023-05-11 PROBLEM — M46.96 UNSPECIFIED INFLAMMATORY SPONDYLOPATHY, LUMBAR REGION (HCC): Status: ACTIVE | Noted: 2023-05-11

## 2023-05-11 PROBLEM — M16.10 PRIMARY LOCALIZED OSTEOARTHRITIS OF PELVIC REGION AND THIGH: Status: ACTIVE | Noted: 2023-05-11

## 2023-05-11 PROBLEM — M46.1 SACROILIITIS, NOT ELSEWHERE CLASSIFIED (HCC): Status: ACTIVE | Noted: 2023-05-11

## 2023-05-11 PROCEDURE — G8417 CALC BMI ABV UP PARAM F/U: HCPCS | Performed by: FAMILY MEDICINE

## 2023-05-11 PROCEDURE — G8427 DOCREV CUR MEDS BY ELIG CLIN: HCPCS | Performed by: FAMILY MEDICINE

## 2023-05-11 PROCEDURE — 1036F TOBACCO NON-USER: CPT | Performed by: FAMILY MEDICINE

## 2023-05-11 PROCEDURE — 99214 OFFICE O/P EST MOD 30 MIN: CPT | Performed by: FAMILY MEDICINE

## 2023-05-11 PROCEDURE — 1123F ACP DISCUSS/DSCN MKR DOCD: CPT | Performed by: FAMILY MEDICINE

## 2023-05-11 RX ORDER — LIDOCAINE 50 MG/G
1 PATCH TOPICAL DAILY
Qty: 10 PATCH | Refills: 1 | Status: SHIPPED | OUTPATIENT
Start: 2023-05-11 | End: 2023-05-31

## 2023-05-11 SDOH — ECONOMIC STABILITY: FOOD INSECURITY: WITHIN THE PAST 12 MONTHS, YOU WORRIED THAT YOUR FOOD WOULD RUN OUT BEFORE YOU GOT MONEY TO BUY MORE.: NEVER TRUE

## 2023-05-11 SDOH — ECONOMIC STABILITY: FOOD INSECURITY: WITHIN THE PAST 12 MONTHS, THE FOOD YOU BOUGHT JUST DIDN'T LAST AND YOU DIDN'T HAVE MONEY TO GET MORE.: NEVER TRUE

## 2023-05-11 SDOH — ECONOMIC STABILITY: INCOME INSECURITY: HOW HARD IS IT FOR YOU TO PAY FOR THE VERY BASICS LIKE FOOD, HOUSING, MEDICAL CARE, AND HEATING?: NOT HARD AT ALL

## 2023-05-11 SDOH — ECONOMIC STABILITY: HOUSING INSECURITY
IN THE LAST 12 MONTHS, WAS THERE A TIME WHEN YOU DID NOT HAVE A STEADY PLACE TO SLEEP OR SLEPT IN A SHELTER (INCLUDING NOW)?: NO

## 2023-05-11 ASSESSMENT — PATIENT HEALTH QUESTIONNAIRE - PHQ9
SUM OF ALL RESPONSES TO PHQ9 QUESTIONS 1 & 2: 0
SUM OF ALL RESPONSES TO PHQ QUESTIONS 1-9: 1
SUM OF ALL RESPONSES TO PHQ QUESTIONS 1-9: 1
1. LITTLE INTEREST OR PLEASURE IN DOING THINGS: 0
2. FEELING DOWN, DEPRESSED OR HOPELESS: 0
9. THOUGHTS THAT YOU WOULD BE BETTER OFF DEAD, OR OF HURTING YOURSELF: 0
4. FEELING TIRED OR HAVING LITTLE ENERGY: 1
10. IF YOU CHECKED OFF ANY PROBLEMS, HOW DIFFICULT HAVE THESE PROBLEMS MADE IT FOR YOU TO DO YOUR WORK, TAKE CARE OF THINGS AT HOME, OR GET ALONG WITH OTHER PEOPLE: 0
3. TROUBLE FALLING OR STAYING ASLEEP: 0
5. POOR APPETITE OR OVEREATING: 0
SUM OF ALL RESPONSES TO PHQ QUESTIONS 1-9: 1
SUM OF ALL RESPONSES TO PHQ QUESTIONS 1-9: 1
8. MOVING OR SPEAKING SO SLOWLY THAT OTHER PEOPLE COULD HAVE NOTICED. OR THE OPPOSITE, BEING SO FIGETY OR RESTLESS THAT YOU HAVE BEEN MOVING AROUND A LOT MORE THAN USUAL: 0
7. TROUBLE CONCENTRATING ON THINGS, SUCH AS READING THE NEWSPAPER OR WATCHING TELEVISION: 0
6. FEELING BAD ABOUT YOURSELF - OR THAT YOU ARE A FAILURE OR HAVE LET YOURSELF OR YOUR FAMILY DOWN: 0

## 2023-05-11 ASSESSMENT — ENCOUNTER SYMPTOMS
SHORTNESS OF BREATH: 0
DIARRHEA: 0
CHEST TIGHTNESS: 0
ALLERGIC/IMMUNOLOGIC NEGATIVE: 1
COUGH: 0
NAUSEA: 0
ABDOMINAL PAIN: 0
BACK PAIN: 0
EYES NEGATIVE: 1
CONSTIPATION: 0
VOMITING: 0
WHEEZING: 0

## 2023-05-18 ENCOUNTER — TELEPHONE (OUTPATIENT)
Dept: FAMILY MEDICINE CLINIC | Facility: CLINIC | Age: 88
End: 2023-05-18

## 2023-05-18 ENCOUNTER — TELEMEDICINE (OUTPATIENT)
Dept: FAMILY MEDICINE CLINIC | Facility: CLINIC | Age: 88
End: 2023-05-18
Payer: MEDICARE

## 2023-05-18 DIAGNOSIS — Z00.00 MEDICARE ANNUAL WELLNESS VISIT, SUBSEQUENT: Primary | ICD-10-CM

## 2023-05-18 PROCEDURE — G0439 PPPS, SUBSEQ VISIT: HCPCS | Performed by: FAMILY MEDICINE

## 2023-05-18 PROCEDURE — 1123F ACP DISCUSS/DSCN MKR DOCD: CPT | Performed by: FAMILY MEDICINE

## 2023-05-18 ASSESSMENT — PATIENT HEALTH QUESTIONNAIRE - PHQ9
7. TROUBLE CONCENTRATING ON THINGS, SUCH AS READING THE NEWSPAPER OR WATCHING TELEVISION: 0
2. FEELING DOWN, DEPRESSED OR HOPELESS: 0
SUM OF ALL RESPONSES TO PHQ QUESTIONS 1-9: 2
SUM OF ALL RESPONSES TO PHQ9 QUESTIONS 1 & 2: 0
5. POOR APPETITE OR OVEREATING: 0
SUM OF ALL RESPONSES TO PHQ QUESTIONS 1-9: 2
1. LITTLE INTEREST OR PLEASURE IN DOING THINGS: 0
3. TROUBLE FALLING OR STAYING ASLEEP: 1
6. FEELING BAD ABOUT YOURSELF - OR THAT YOU ARE A FAILURE OR HAVE LET YOURSELF OR YOUR FAMILY DOWN: 0
8. MOVING OR SPEAKING SO SLOWLY THAT OTHER PEOPLE COULD HAVE NOTICED. OR THE OPPOSITE, BEING SO FIGETY OR RESTLESS THAT YOU HAVE BEEN MOVING AROUND A LOT MORE THAN USUAL: 1
4. FEELING TIRED OR HAVING LITTLE ENERGY: 0
SUM OF ALL RESPONSES TO PHQ QUESTIONS 1-9: 2
SUM OF ALL RESPONSES TO PHQ QUESTIONS 1-9: 2
9. THOUGHTS THAT YOU WOULD BE BETTER OFF DEAD, OR OF HURTING YOURSELF: 0
10. IF YOU CHECKED OFF ANY PROBLEMS, HOW DIFFICULT HAVE THESE PROBLEMS MADE IT FOR YOU TO DO YOUR WORK, TAKE CARE OF THINGS AT HOME, OR GET ALONG WITH OTHER PEOPLE: 0

## 2023-05-18 ASSESSMENT — LIFESTYLE VARIABLES
HOW MANY STANDARD DRINKS CONTAINING ALCOHOL DO YOU HAVE ON A TYPICAL DAY: PATIENT DOES NOT DRINK
HOW OFTEN DO YOU HAVE A DRINK CONTAINING ALCOHOL: NEVER

## 2023-05-18 NOTE — PATIENT INSTRUCTIONS
Learning About Being Active as an Older Adult  Why is being active important as you get older? Being active is one of the best things you can do for your health. And it's never too late to start. Being active--or getting active, if you aren't already--has definite benefits. It can:  Give you more energy,  Keep your mind sharp. Improve balance to reduce your risk of falls. Help you manage chronic illness with fewer medicines. No matter how old you are, how fit you are, or what health problems you have, there is a form of activity that will work for you. And the more physical activity you can do, the better your overall health will be. What kinds of activity can help you stay healthy? Being more active will make your daily activities easier. Physical activity includes planned exercise and things you do in daily life. There are four types of activity:  Aerobic. Doing aerobic activity makes your heart and lungs strong. Includes walking, dancing, and gardening. Aim for at least 2½ hours spread throughout the week. It improves your energy and can help you sleep better. Muscle-strengthening. This type of activity can help maintain muscle and strengthen bones. Includes climbing stairs, using resistance bands, and lifting or carrying heavy loads. Aim for at least twice a week. It can help protect the knees and other joints. Stretching. Stretching gives you better range of motion in joints and muscles. Includes upper arm stretches, calf stretches, and gentle yoga. Aim for at least twice a week, preferably after your muscles are warmed up from other activities. It can help you function better in daily life. Balancing. This helps you stay coordinated and have good posture. Includes heel-to-toe walking, brea chi, and certain types of yoga. Aim for at least 3 days a week. It can reduce your risk of falling.   Even if you have a hard time meeting the recommendations, it's better to be more active

## 2023-05-18 NOTE — TELEPHONE ENCOUNTER
Called and spoke with patient, explaiend his insurance denied the prior authorization for Lidocaine patches and he can purchase for cash  Patient voiced understanding

## 2023-05-18 NOTE — PROGRESS NOTES
Care Team:  Sharyle Freund, DO as PCP - [id number] Mercy Cordell, DO as PCP - [facility]  Collette Altes, MD as Surgeon  Fani Quintero MD as Physician  Elonda Cushing, MD as Surgeon  Ashutosh Kay DO as Surgeon  Susana Meza MD as Surgeon     Reviewed and updated this visit:  Allergies  Med Hx  Surg Hx  Soc Hx  Fam Hx         Iglesia Spencer, was evaluated through a synchronous (real-time) audio-video encounter. The patient (or guardian if applicable) is aware that this is a billable service, which includes applicable co-pays. This Virtual Visit was conducted with patient's (and/or legal guardian's) consent. Patient identification was verified, and a caregiver was present when appropriate.    The patient was located at Home: [facility]  Provider was located at [facility] ([address] ): [address] [place] [address]        Sharyle Freund, DO

## 2023-06-12 ENCOUNTER — TELEPHONE (OUTPATIENT)
Dept: FAMILY MEDICINE CLINIC | Facility: CLINIC | Age: 88
End: 2023-06-12

## 2023-06-12 NOTE — TELEPHONE ENCOUNTER
Pt complaining of diarrhea and is not able to make an appt. He is asking for prescription medication because Pepto is not working. He originally asked to speak to Dr. Rayray Zarate nurse and I told him she was not available. He is asking for return call asap from nurse.

## 2023-06-23 ENCOUNTER — TELEPHONE (OUTPATIENT)
Dept: FAMILY MEDICINE CLINIC | Facility: CLINIC | Age: 88
End: 2023-06-23

## 2023-06-23 NOTE — TELEPHONE ENCOUNTER
Pt has had severe diarrhea and OTC medication is not working. He would like a call back for further recommendation on what he can do or if something can be called in to [facility] in .

## 2023-06-23 NOTE — TELEPHONE ENCOUNTER
Returned call, no answer, left message via voice mail that Dr Michael Patel is not in the office today, suggested patient go to Urgent Care for evaluation and treatment

## 2023-09-22 RX ORDER — OMEPRAZOLE 40 MG/1
40 CAPSULE, DELAYED RELEASE ORAL DAILY
Qty: 90 CAPSULE | Refills: 3 | Status: SHIPPED | OUTPATIENT
Start: 2023-09-22

## 2023-09-25 RX ORDER — OMEPRAZOLE 40 MG/1
40 CAPSULE, DELAYED RELEASE ORAL DAILY
Qty: 90 CAPSULE | Refills: 0 | OUTPATIENT
Start: 2023-09-25

## 2023-10-12 RX ORDER — METOPROLOL SUCCINATE 100 MG/1
100 TABLET, EXTENDED RELEASE ORAL DAILY
Qty: 90 TABLET | Refills: 3 | Status: SHIPPED | OUTPATIENT
Start: 2023-10-12

## 2023-11-06 DIAGNOSIS — F33.0 MILD EPISODE OF RECURRENT MAJOR DEPRESSIVE DISORDER (HCC): ICD-10-CM

## 2023-11-06 RX ORDER — CITALOPRAM 20 MG/1
20 TABLET ORAL DAILY
Qty: 90 TABLET | Refills: 3 | Status: SHIPPED | OUTPATIENT
Start: 2023-11-06

## 2023-11-06 NOTE — TELEPHONE ENCOUNTER
----- Message from Thuan Monteiro sent at 11/6/2023 12:40 PM EST -----  Subject: Refill Request    QUESTIONS  Name of Medication? citalopram (CELEXA) 20 MG tablet  Patient-reported dosage and instructions? 20 MG 1 time a day  How many days do you have left? 0  Preferred Pharmacy? [address] [facility] [address]  Pharmacy phone number (if available)? [phone]  Additional Information for Provider? URgent patient is out of medication  ---------------------------------------------------------------------------  --------------  CALL BACK INFO  What is the best way for the office to contact you? OK to leave message on   voicemail  Preferred Call Back Phone Number? [phone]  ---------------------------------------------------------------------------  --------------  SCRIPT ANSWERS  Relationship to Patient?  Self

## 2023-11-08 NOTE — TELEPHONE ENCOUNTER
----- Message from Terrence Rony sent at 11/8/2023  3:11 PM EST -----  Subject: Refill Request    QUESTIONS  Name of Medication? losartan (COZAAR) 25 MG tablet  Patient-reported dosage and instructions? 1 a day  How many days do you have left? 4  Preferred Pharmacy? [facility] [address]  Pharmacy phone number (if available)? [phone]  Additional Information for Provider? Pt states that He gets 90 day supply  ---------------------------------------------------------------------------  --------------  CALL BACK INFO  What is the best way for the office to contact you? OK to leave message on   voicemail  Preferred Call Back Phone Number? [phone]  ---------------------------------------------------------------------------  --------------  SCRIPT ANSWERS  Relationship to Patient?  Self

## 2023-11-09 RX ORDER — LOSARTAN POTASSIUM 25 MG/1
25 TABLET ORAL DAILY
Qty: 90 TABLET | Refills: 3 | Status: SHIPPED | OUTPATIENT
Start: 2023-11-09

## 2023-12-05 ENCOUNTER — TELEPHONE (OUTPATIENT)
Dept: FAMILY MEDICINE CLINIC | Facility: CLINIC | Age: 88
End: 2023-12-05

## 2023-12-05 RX ORDER — LEVETIRACETAM 1000 MG/1
TABLET ORAL
Qty: 90 TABLET | Refills: 0 | OUTPATIENT
Start: 2023-12-05

## 2023-12-05 NOTE — TELEPHONE ENCOUNTER
Spoke with patient, lynette has a 90 day refill remaining at pharmacy, this was confirmed with pharmacy  Patient voiced understanding and will call pharmacy for refill

## 2024-01-01 ENCOUNTER — HOME CARE VISIT (OUTPATIENT)
Dept: HOSPICE | Facility: HOSPICE | Age: 89
End: 2024-01-01
Payer: COMMERCIAL

## 2024-01-01 ENCOUNTER — HOME CARE VISIT (OUTPATIENT)
Dept: SCHEDULING | Facility: HOME HEALTH | Age: 89
End: 2024-01-01
Payer: COMMERCIAL

## 2024-01-01 VITALS
SYSTOLIC BLOOD PRESSURE: 128 MMHG | HEART RATE: 96 BPM | DIASTOLIC BLOOD PRESSURE: 64 MMHG | RESPIRATION RATE: 24 BRPM | TEMPERATURE: 97.2 F

## 2024-01-01 VITALS
TEMPERATURE: 98.1 F | SYSTOLIC BLOOD PRESSURE: 84 MMHG | HEART RATE: 109 BPM | DIASTOLIC BLOOD PRESSURE: 54 MMHG | RESPIRATION RATE: 14 BRPM

## 2024-01-01 VITALS
HEART RATE: 104 BPM | TEMPERATURE: 98.1 F | SYSTOLIC BLOOD PRESSURE: 96 MMHG | DIASTOLIC BLOOD PRESSURE: 56 MMHG | RESPIRATION RATE: 16 BRPM

## 2024-01-01 VITALS
SYSTOLIC BLOOD PRESSURE: 78 MMHG | DIASTOLIC BLOOD PRESSURE: 52 MMHG | TEMPERATURE: 97.3 F | HEART RATE: 109 BPM | RESPIRATION RATE: 14 BRPM

## 2024-01-01 VITALS
HEART RATE: 96 BPM | TEMPERATURE: 97.2 F | RESPIRATION RATE: 20 BRPM | SYSTOLIC BLOOD PRESSURE: 128 MMHG | DIASTOLIC BLOOD PRESSURE: 76 MMHG

## 2024-01-01 PROCEDURE — G0299 HHS/HOSPICE OF RN EA 15 MIN: HCPCS

## 2024-01-01 PROCEDURE — G0155 HHCP-SVS OF CSW,EA 15 MIN: HCPCS

## 2024-01-01 PROCEDURE — G0156 HHCP-SVS OF AIDE,EA 15 MIN: HCPCS

## 2024-01-01 ASSESSMENT — ENCOUNTER SYMPTOMS: BOWEL INCONTINENCE: 1

## 2024-01-19 ENCOUNTER — TELEPHONE (OUTPATIENT)
Dept: FAMILY MEDICINE CLINIC | Facility: CLINIC | Age: 89
End: 2024-01-19

## 2024-01-19 NOTE — TELEPHONE ENCOUNTER
Spoke with patient, informed should have refill at his pharmacy and to call pharmacy for refill  Patient voice understanding

## 2024-04-30 ENCOUNTER — HOSPICE ADMISSION (OUTPATIENT)
Dept: HOSPICE | Facility: HOSPICE | Age: 89
End: 2024-04-30
Payer: COMMERCIAL

## 2024-05-01 ENCOUNTER — HOME CARE VISIT (OUTPATIENT)
Dept: HOSPICE | Facility: HOSPICE | Age: 89
End: 2024-05-01
Payer: COMMERCIAL

## 2024-05-01 PROBLEM — Z51.5 HOSPICE CARE: Status: ACTIVE | Noted: 2024-05-01

## 2024-05-01 PROCEDURE — G0299 HHS/HOSPICE OF RN EA 15 MIN: HCPCS

## 2024-05-01 PROCEDURE — 0651 HSPC ROUTINE HOME CARE

## 2024-05-02 ENCOUNTER — HOME CARE VISIT (OUTPATIENT)
Dept: HOSPICE | Facility: HOSPICE | Age: 89
End: 2024-05-02
Payer: COMMERCIAL

## 2024-05-02 VITALS
WEIGHT: 240 LBS | RESPIRATION RATE: 24 BRPM | HEIGHT: 72 IN | SYSTOLIC BLOOD PRESSURE: 132 MMHG | DIASTOLIC BLOOD PRESSURE: 74 MMHG | HEART RATE: 88 BPM | TEMPERATURE: 98.2 F | BODY MASS INDEX: 32.51 KG/M2

## 2024-05-02 PROBLEM — L89.323 PRESSURE INJURY OF LEFT BUTTOCK, STAGE 3 (HCC): Status: ACTIVE | Noted: 2023-05-25

## 2024-05-02 PROBLEM — S72.001A CLOSED DISPLACED FRACTURE OF RIGHT FEMORAL NECK (HCC): Status: ACTIVE | Noted: 2024-03-18

## 2024-05-02 PROCEDURE — G0299 HHS/HOSPICE OF RN EA 15 MIN: HCPCS

## 2024-05-02 PROCEDURE — 0651 HSPC ROUTINE HOME CARE

## 2024-05-02 ASSESSMENT — ENCOUNTER SYMPTOMS
BOWEL INCONTINENCE: 1
DYSPNEA ACTIVITY LEVEL: AT REST
STOOL DESCRIPTION: MUSHY
PAIN LOCATION - PAIN QUALITY: SHARP
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
## 2024-05-03 ENCOUNTER — HOME CARE VISIT (OUTPATIENT)
Dept: HOSPICE | Facility: HOSPICE | Age: 89
End: 2024-05-03
Payer: COMMERCIAL

## 2024-05-03 ENCOUNTER — HOME CARE VISIT (OUTPATIENT)
Dept: SCHEDULING | Facility: HOME HEALTH | Age: 89
End: 2024-05-03
Payer: COMMERCIAL

## 2024-05-03 VITALS
RESPIRATION RATE: 24 BRPM | SYSTOLIC BLOOD PRESSURE: 128 MMHG | HEART RATE: 92 BPM | TEMPERATURE: 97.6 F | DIASTOLIC BLOOD PRESSURE: 70 MMHG

## 2024-05-03 PROCEDURE — 0651 HSPC ROUTINE HOME CARE

## 2024-05-03 PROCEDURE — G0156 HHCP-SVS OF AIDE,EA 15 MIN: HCPCS

## 2024-05-03 PROCEDURE — G0155 HHCP-SVS OF CSW,EA 15 MIN: HCPCS

## 2024-05-03 ASSESSMENT — ENCOUNTER SYMPTOMS
COUGH: 1
COUGH CHARACTERISTICS: DRY
STOOL DESCRIPTION: LOOSE
BOWEL INCONTINENCE: 1
DIARRHEA: 1
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
## 2024-05-04 PROCEDURE — 0651 HSPC ROUTINE HOME CARE

## 2024-05-05 PROCEDURE — 0651 HSPC ROUTINE HOME CARE

## 2024-05-06 ENCOUNTER — HOME CARE VISIT (OUTPATIENT)
Dept: SCHEDULING | Facility: HOME HEALTH | Age: 89
End: 2024-05-06
Payer: COMMERCIAL

## 2024-05-06 ENCOUNTER — HOME CARE VISIT (OUTPATIENT)
Dept: HOSPICE | Facility: HOSPICE | Age: 89
End: 2024-05-06
Payer: COMMERCIAL

## 2024-05-06 PROCEDURE — G0299 HHS/HOSPICE OF RN EA 15 MIN: HCPCS

## 2024-05-06 PROCEDURE — 0651 HSPC ROUTINE HOME CARE

## 2024-05-06 PROCEDURE — G0156 HHCP-SVS OF AIDE,EA 15 MIN: HCPCS

## 2024-05-06 PROCEDURE — 2500000001 HSPC NON INJECTABLE MED

## 2024-05-07 ENCOUNTER — HOME CARE VISIT (OUTPATIENT)
Dept: HOSPICE | Facility: HOSPICE | Age: 89
End: 2024-05-07
Payer: COMMERCIAL

## 2024-05-07 VITALS
SYSTOLIC BLOOD PRESSURE: 108 MMHG | DIASTOLIC BLOOD PRESSURE: 54 MMHG | RESPIRATION RATE: 20 BRPM | HEART RATE: 82 BPM | TEMPERATURE: 98.1 F

## 2024-05-07 VITALS
DIASTOLIC BLOOD PRESSURE: 66 MMHG | RESPIRATION RATE: 24 BRPM | SYSTOLIC BLOOD PRESSURE: 118 MMHG | TEMPERATURE: 97.2 F | HEART RATE: 72 BPM

## 2024-05-07 PROCEDURE — G0299 HHS/HOSPICE OF RN EA 15 MIN: HCPCS

## 2024-05-07 PROCEDURE — 0651 HSPC ROUTINE HOME CARE

## 2024-05-07 PROCEDURE — 2500000001 HSPC NON INJECTABLE MED

## 2024-05-07 ASSESSMENT — ENCOUNTER SYMPTOMS
COUGH: 1
COUGH CHARACTERISTICS: DRY
COUGH: 1
STOOL DESCRIPTION: MUSHY
PAIN LOCATION - PAIN QUALITY: SHARP
HEMOPTYSIS: 0
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
## 2024-05-07 NOTE — HOSPICE
Joint visit made with Dr Reardon this day  Pt pale increase edema arms and legs since sn saw pt 7 days ago.   Arms and legs elevated on pillows.  Pt in bed  Non productive cough noted.  02 at 2 L NC continuous. Pt in no distress.  Dr Reardon noted rales in lungs and edema---lasix 20 daily ordered.  Pt has incent spirom and was told to use it every 2 hours while awake per Dr Reardon--family states they will remind pt.   Pt admits to having some anxiety which has not verbalized to family and Dr ordered scheduled ativan 2 x in day around 10 am and 4 pm and dose of atarax at bedtime  Pain med changed from liq morphine to hydrocodone 5/325 mg every 4 hours as needed--teaching done on se and duration and onset of med.  Dr Reardon reminded family to watch for constipation and use prn meds if neeeded.  Family states they will do so.   SN encouraged use of scheduling as pt does not always verbalize to family when he is having pain. Son sees how pt is not verbalizing his anxiety and agrees to schedule pain med's in daytime,   Appetite poor--eating few bites of maria this am  and abdomen remains distended Last BM yesterday brown soft mushy stool.   Occasional nausea    Drinking fluids.    FC patent and  draining 400 cc of clear dark debora urine,  Told to empty when large bag is 1/2 way filled.  FC strapped to thigh   no dependant loops  bag below bladder  No  leaking from cath.  Wound care to coccyx changed yesterday--no assessment done this visit.  Foam dressing applied to heel --scant amount of serosang from wound edges--black eschar to center for wound.   Pt c/o of sore throat  -white patch noted on top of palate - Dr Reardon ordered nystatin swish and swallow.  Process explained to family  All abx are finished as of this day and were discontinued off of med sheet.   Med sheet updated with med changes in home in computer.  Lc called with med changes to to fill new meds.  Hydorcodone rx to be faxed to md by pharmacy   Dr Greco

## 2024-05-08 ENCOUNTER — HOME CARE VISIT (OUTPATIENT)
Dept: SCHEDULING | Facility: HOME HEALTH | Age: 89
End: 2024-05-08
Payer: COMMERCIAL

## 2024-05-08 PROCEDURE — G0156 HHCP-SVS OF AIDE,EA 15 MIN: HCPCS

## 2024-05-08 PROCEDURE — 0651 HSPC ROUTINE HOME CARE

## 2024-05-09 PROCEDURE — 0651 HSPC ROUTINE HOME CARE

## 2024-05-10 ENCOUNTER — HOME CARE VISIT (OUTPATIENT)
Dept: SCHEDULING | Facility: HOME HEALTH | Age: 89
End: 2024-05-10
Payer: COMMERCIAL

## 2024-05-10 PROCEDURE — 0651 HSPC ROUTINE HOME CARE

## 2024-05-11 PROCEDURE — 0651 HSPC ROUTINE HOME CARE

## 2024-05-12 PROCEDURE — 0651 HSPC ROUTINE HOME CARE

## 2024-05-13 ENCOUNTER — HOME CARE VISIT (OUTPATIENT)
Dept: HOSPICE | Facility: HOSPICE | Age: 89
End: 2024-05-13
Payer: COMMERCIAL

## 2024-05-13 ENCOUNTER — HOME CARE VISIT (OUTPATIENT)
Dept: SCHEDULING | Facility: HOME HEALTH | Age: 89
End: 2024-05-13
Payer: COMMERCIAL

## 2024-05-13 PROCEDURE — G0156 HHCP-SVS OF AIDE,EA 15 MIN: HCPCS

## 2024-05-13 PROCEDURE — G0299 HHS/HOSPICE OF RN EA 15 MIN: HCPCS

## 2024-05-13 PROCEDURE — 0651 HSPC ROUTINE HOME CARE

## 2024-05-14 ENCOUNTER — HOME CARE VISIT (OUTPATIENT)
Dept: SCHEDULING | Facility: HOME HEALTH | Age: 89
End: 2024-05-14
Payer: COMMERCIAL

## 2024-05-14 VITALS
SYSTOLIC BLOOD PRESSURE: 120 MMHG | RESPIRATION RATE: 24 BRPM | HEART RATE: 96 BPM | TEMPERATURE: 97.5 F | DIASTOLIC BLOOD PRESSURE: 68 MMHG

## 2024-05-14 PROCEDURE — G0155 HHCP-SVS OF CSW,EA 15 MIN: HCPCS

## 2024-05-14 PROCEDURE — 0651 HSPC ROUTINE HOME CARE

## 2024-05-15 ENCOUNTER — HOME CARE VISIT (OUTPATIENT)
Dept: SCHEDULING | Facility: HOME HEALTH | Age: 89
End: 2024-05-15
Payer: COMMERCIAL

## 2024-05-15 PROCEDURE — 0651 HSPC ROUTINE HOME CARE

## 2024-05-15 PROCEDURE — G0156 HHCP-SVS OF AIDE,EA 15 MIN: HCPCS

## 2024-05-15 NOTE — HOSPICE
Pt presents as aaox3 with calm affect. Pt reports he does not feel pain as long as he stays still. Granddaughter Cole and her  Ashutosh are both at home 24/7 and confirm pt is difficult to move because of his pain. Pt and family state CM is aware and is addressing pain issues. SW followed up on Medicare billing issue. cole reports she has called twice and been told the [employer] claim on pt's profile has been removed. No other SW needs voiced or indicated at this time.

## 2024-05-16 ENCOUNTER — HOME CARE VISIT (OUTPATIENT)
Dept: HOSPICE | Facility: HOSPICE | Age: 89
End: 2024-05-16
Payer: COMMERCIAL

## 2024-05-16 PROCEDURE — 0651 HSPC ROUTINE HOME CARE

## 2024-05-17 ENCOUNTER — HOME CARE VISIT (OUTPATIENT)
Dept: HOSPICE | Facility: HOSPICE | Age: 89
End: 2024-05-17
Payer: COMMERCIAL

## 2024-05-17 VITALS
SYSTOLIC BLOOD PRESSURE: 124 MMHG | TEMPERATURE: 97.5 F | DIASTOLIC BLOOD PRESSURE: 60 MMHG | RESPIRATION RATE: 24 BRPM | HEART RATE: 96 BPM

## 2024-05-17 PROCEDURE — G0299 HHS/HOSPICE OF RN EA 15 MIN: HCPCS

## 2024-05-17 PROCEDURE — 2500000001 HSPC NON INJECTABLE MED

## 2024-05-17 PROCEDURE — 0651 HSPC ROUTINE HOME CARE

## 2024-05-18 PROCEDURE — 0651 HSPC ROUTINE HOME CARE

## 2024-05-19 PROCEDURE — 0651 HSPC ROUTINE HOME CARE

## 2024-05-20 ENCOUNTER — HOME CARE VISIT (OUTPATIENT)
Dept: SCHEDULING | Facility: HOME HEALTH | Age: 89
End: 2024-05-20
Payer: COMMERCIAL

## 2024-05-20 ENCOUNTER — HOME CARE VISIT (OUTPATIENT)
Dept: HOSPICE | Facility: HOSPICE | Age: 89
End: 2024-05-20
Payer: COMMERCIAL

## 2024-05-20 PROBLEM — F31.10 BIPOLAR DISORDER, CURRENT EPISODE MANIC WITHOUT PSYCHOTIC FEATURES, UNSPECIFIED (HCC): Status: ACTIVE | Noted: 2024-02-19
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
## 2024-05-20 PROBLEM — L89.159 DECUBITUS ULCER OF SACRAL REGION: Status: ACTIVE | Noted: 2024-04-23

## 2024-05-20 PROBLEM — S22.43XD MULTIPLE FRACTURES OF RIBS, BILATERAL, SUBSEQUENT ENCOUNTER FOR FRACTURE WITH ROUTINE HEALING: Status: ACTIVE | Noted: 2024-02-19

## 2024-05-20 PROBLEM — I48.19 OTHER PERSISTENT ATRIAL FIBRILLATION (HCC): Status: ACTIVE | Noted: 2024-02-19

## 2024-05-20 PROBLEM — J18.9 PNEUMONIA: Status: ACTIVE | Noted: 2024-04-23

## 2024-05-20 PROBLEM — N13.9 OBSTRUCTIVE AND REFLUX UROPATHY, UNSPECIFIED: Status: ACTIVE | Noted: 2024-02-19

## 2024-05-20 PROBLEM — A41.9 SEVERE SEPSIS (HCC): Status: ACTIVE | Noted: 2020-07-29

## 2024-05-20 PROBLEM — K81.0 ACUTE CHOLECYSTITIS: Status: ACTIVE | Noted: 2024-04-23

## 2024-05-20 PROBLEM — R65.20 SEVERE SEPSIS (HCC): Status: ACTIVE | Noted: 2020-07-29

## 2024-05-20 PROBLEM — S36.116A LIVER LACERATION, MAJOR: Status: ACTIVE | Noted: 2024-02-19

## 2024-05-20 PROBLEM — F33.9 MAJOR DEPRESSIVE DISORDER, RECURRENT, UNSPECIFIED (HCC): Status: ACTIVE | Noted: 2020-07-29

## 2024-05-20 PROCEDURE — 0651 HSPC ROUTINE HOME CARE

## 2024-05-20 PROCEDURE — G0299 HHS/HOSPICE OF RN EA 15 MIN: HCPCS

## 2024-05-20 PROCEDURE — G0156 HHCP-SVS OF AIDE,EA 15 MIN: HCPCS

## 2024-05-20 PROCEDURE — 2500000001 HSPC NON INJECTABLE MED

## 2024-05-20 ASSESSMENT — ENCOUNTER SYMPTOMS
BOWEL INCONTINENCE: 1
COUGH CHARACTERISTICS: DRY
COUGH: 1
STOOL DESCRIPTION: SOFT FORMED

## 2024-05-20 NOTE — HOSPICE
RN visit in response to caregivers call reporting pt up all  night with hallucination,and non stop talking. They report he did not recognize them at times and wanting to get and go home..  Upon arrival pt alert, calm. He recongized rN as someone who he has seen and comes to work for him. Cole and her husban present. Pt appetite and eating remains unchanged,good. Less pallor. Dressings changed yesterday by caregiver. Discussed with family not to give additional mirtazipine as done last night. Reinforced hspice RN avail for any nighttime concerns/issues. Pt pleasant and calm . Pt unable to recognize or supply name of his beloved great grandson during encounter.  Caregivers report talking about and to his  relatives and points to won=man seated on sofa. Discussed sleep deprivation worsens delirium. Also teaching re hallucinations delirium possible signs of approaching disease progression. Informed time would define the differnece. . Informed would contact provider and return call re how to manage. Informed hallucinations that do not cause him fear or distress usually do not require treatment. Caregivers vocied understnading  Post visit phoned Katie Bush NP and reported visit findings. Katie reviewed meds with RN. Haloperidol ordered ,lorazepam switched to intensol and morphine concentrate returned to profile in event needed  in case he exhibits decline. Called caregivers and informed of medication changes and give lorazepam dose at bedtime to induce rest. Informed meds should be delivered next day and call as needed

## 2024-05-21 PROCEDURE — 0651 HSPC ROUTINE HOME CARE

## 2024-05-22 ENCOUNTER — HOME CARE VISIT (OUTPATIENT)
Dept: SCHEDULING | Facility: HOME HEALTH | Age: 89
End: 2024-05-22
Payer: COMMERCIAL

## 2024-05-22 PROCEDURE — G0156 HHCP-SVS OF AIDE,EA 15 MIN: HCPCS

## 2024-05-22 PROCEDURE — 0651 HSPC ROUTINE HOME CARE

## 2024-05-23 PROCEDURE — 0651 HSPC ROUTINE HOME CARE

## 2024-05-24 ENCOUNTER — HOME CARE VISIT (OUTPATIENT)
Dept: SCHEDULING | Facility: HOME HEALTH | Age: 89
End: 2024-05-24
Payer: COMMERCIAL

## 2024-05-24 PROCEDURE — G0156 HHCP-SVS OF AIDE,EA 15 MIN: HCPCS

## 2024-05-24 PROCEDURE — 0651 HSPC ROUTINE HOME CARE

## 2024-05-25 PROCEDURE — 0651 HSPC ROUTINE HOME CARE

## 2024-05-26 PROCEDURE — 0651 HSPC ROUTINE HOME CARE

## 2024-05-27 ENCOUNTER — HOME CARE VISIT (OUTPATIENT)
Dept: HOSPICE | Facility: HOSPICE | Age: 89
End: 2024-05-27
Payer: COMMERCIAL

## 2024-05-27 PROCEDURE — 0651 HSPC ROUTINE HOME CARE

## 2024-05-27 PROCEDURE — G0299 HHS/HOSPICE OF RN EA 15 MIN: HCPCS

## 2024-05-28 VITALS
DIASTOLIC BLOOD PRESSURE: 70 MMHG | RESPIRATION RATE: 20 BRPM | HEART RATE: 84 BPM | SYSTOLIC BLOOD PRESSURE: 112 MMHG | TEMPERATURE: 87.2 F

## 2024-05-28 PROCEDURE — 0651 HSPC ROUTINE HOME CARE

## 2024-05-28 ASSESSMENT — ENCOUNTER SYMPTOMS
STOOL DESCRIPTION: MUSHY
PAIN LOCATION - PAIN QUALITY: ACHE
BOWEL INCONTINENCE: 1

## 2024-05-29 ENCOUNTER — HOME CARE VISIT (OUTPATIENT)
Dept: SCHEDULING | Facility: HOME HEALTH | Age: 89
End: 2024-05-29
Payer: COMMERCIAL

## 2024-05-29 PROCEDURE — G0156 HHCP-SVS OF AIDE,EA 15 MIN: HCPCS

## 2024-05-29 PROCEDURE — 0651 HSPC ROUTINE HOME CARE

## 2024-05-30 PROCEDURE — 0651 HSPC ROUTINE HOME CARE

## 2024-05-31 ENCOUNTER — HOME CARE VISIT (OUTPATIENT)
Dept: SCHEDULING | Facility: HOME HEALTH | Age: 89
End: 2024-05-31
Payer: COMMERCIAL

## 2024-05-31 PROCEDURE — 0651 HSPC ROUTINE HOME CARE

## 2024-05-31 PROCEDURE — G0156 HHCP-SVS OF AIDE,EA 15 MIN: HCPCS

## 2024-06-01 PROCEDURE — 0651 HSPC ROUTINE HOME CARE

## 2024-06-02 PROCEDURE — 0651 HSPC ROUTINE HOME CARE

## 2024-06-03 ENCOUNTER — HOME CARE VISIT (OUTPATIENT)
Dept: HOSPICE | Facility: HOSPICE | Age: 89
End: 2024-06-03
Payer: COMMERCIAL

## 2024-06-05 ENCOUNTER — HOME CARE VISIT (OUTPATIENT)
Dept: SCHEDULING | Facility: HOME HEALTH | Age: 89
End: 2024-06-05
Payer: COMMERCIAL

## 2024-06-05 VITALS — RESPIRATION RATE: 24 BRPM | HEART RATE: 96 BPM | SYSTOLIC BLOOD PRESSURE: 132 MMHG | DIASTOLIC BLOOD PRESSURE: 74 MMHG

## 2024-06-05 PROCEDURE — G0156 HHCP-SVS OF AIDE,EA 15 MIN: HCPCS

## 2024-06-05 ASSESSMENT — ENCOUNTER SYMPTOMS
STOOL DESCRIPTION: FORMED
BOWEL INCONTINENCE: 1

## 2024-06-07 ENCOUNTER — HOME CARE VISIT (OUTPATIENT)
Dept: SCHEDULING | Facility: HOME HEALTH | Age: 89
End: 2024-06-07
Payer: COMMERCIAL

## 2024-06-07 PROCEDURE — G0155 HHCP-SVS OF CSW,EA 15 MIN: HCPCS

## 2024-06-07 PROCEDURE — G0156 HHCP-SVS OF AIDE,EA 15 MIN: HCPCS

## 2024-06-10 ENCOUNTER — HOME CARE VISIT (OUTPATIENT)
Dept: SCHEDULING | Facility: HOME HEALTH | Age: 89
End: 2024-06-10
Payer: COMMERCIAL

## 2024-06-10 PROCEDURE — G0156 HHCP-SVS OF AIDE,EA 15 MIN: HCPCS

## 2024-06-12 ENCOUNTER — HOME CARE VISIT (OUTPATIENT)
Dept: SCHEDULING | Facility: HOME HEALTH | Age: 89
End: 2024-06-12
Payer: COMMERCIAL

## 2024-06-12 PROCEDURE — G0156 HHCP-SVS OF AIDE,EA 15 MIN: HCPCS

## 2024-06-12 NOTE — HOSPICE
Pt presents as aaox2 with flat affect. Pt has just seen CM and CNA and is very sleepy, struggling to keep eyes open throughout visit. Ashutosh at bedside and reports no needs or concerns. Ashutosh states pt has pain when he is moved but otherwise symptoms are well managed, Ashutosh feels that he and wife Cole are able to handle pt's care needs adequately so far. Pt and family all coping appropriately. [No Acute Distress] : no acute distress [Well Nourished] : well nourished [Well Developed] : well developed [Well-Appearing] : well-appearing [Normal Sclera/Conjunctiva] : normal sclera/conjunctiva [PERRL] : pupils equal round and reactive to light [EOMI] : extraocular movements intact [Normal Outer Ear/Nose] : the outer ears and nose were normal in appearance [Normal Oropharynx] : the oropharynx was normal [No JVD] : no jugular venous distention [No Lymphadenopathy] : no lymphadenopathy [Supple] : supple [Thyroid Normal, No Nodules] : the thyroid was normal and there were no nodules present [No Respiratory Distress] : no respiratory distress  [No Accessory Muscle Use] : no accessory muscle use [Clear to Auscultation] : lungs were clear to auscultation bilaterally [Normal Rate] : normal rate  [Regular Rhythm] : with a regular rhythm [Normal S1, S2] : normal S1 and S2 [No Murmur] : no murmur heard [No Carotid Bruits] : no carotid bruits [No Abdominal Bruit] : a ~M bruit was not heard ~T in the abdomen [No Varicosities] : no varicosities [Pedal Pulses Present] : the pedal pulses are present [No Edema] : there was no peripheral edema [No Palpable Aorta] : no palpable aorta [No Extremity Clubbing/Cyanosis] : no extremity clubbing/cyanosis [Soft] : abdomen soft [Non Tender] : non-tender [Non-distended] : non-distended [No Masses] : no abdominal mass palpated [No HSM] : no HSM [Normal Bowel Sounds] : normal bowel sounds [Normal Posterior Cervical Nodes] : no posterior cervical lymphadenopathy [Normal Anterior Cervical Nodes] : no anterior cervical lymphadenopathy [No CVA Tenderness] : no CVA  tenderness [No Spinal Tenderness] : no spinal tenderness [No Joint Swelling] : no joint swelling [Grossly Normal Strength/Tone] : grossly normal strength/tone [No Rash] : no rash [Coordination Grossly Intact] : coordination grossly intact [No Focal Deficits] : no focal deficits [Normal Gait] : normal gait [Deep Tendon Reflexes (DTR)] : deep tendon reflexes were 2+ and symmetric [Normal Affect] : the affect was normal [Normal Insight/Judgement] : insight and judgment were intact

## 2024-06-14 ENCOUNTER — HOME CARE VISIT (OUTPATIENT)
Dept: SCHEDULING | Facility: HOME HEALTH | Age: 89
End: 2024-06-14
Payer: COMMERCIAL

## 2024-06-14 PROCEDURE — G0156 HHCP-SVS OF AIDE,EA 15 MIN: HCPCS

## 2024-06-17 ENCOUNTER — HOME CARE VISIT (OUTPATIENT)
Dept: HOSPICE | Facility: HOSPICE | Age: 89
End: 2024-06-17
Payer: COMMERCIAL

## 2024-06-19 ENCOUNTER — HOME CARE VISIT (OUTPATIENT)
Dept: SCHEDULING | Facility: HOME HEALTH | Age: 89
End: 2024-06-19
Payer: COMMERCIAL

## 2024-06-19 PROCEDURE — G0156 HHCP-SVS OF AIDE,EA 15 MIN: HCPCS

## 2024-06-21 ENCOUNTER — HOME CARE VISIT (OUTPATIENT)
Dept: SCHEDULING | Facility: HOME HEALTH | Age: 89
End: 2024-06-21
Payer: COMMERCIAL

## 2024-06-21 PROCEDURE — G0156 HHCP-SVS OF AIDE,EA 15 MIN: HCPCS

## 2024-06-23 ENCOUNTER — HOME CARE VISIT (OUTPATIENT)
Dept: SCHEDULING | Facility: HOME HEALTH | Age: 89
End: 2024-06-23
Payer: COMMERCIAL

## 2024-06-24 ENCOUNTER — HOME CARE VISIT (OUTPATIENT)
Dept: SCHEDULING | Facility: HOME HEALTH | Age: 89
End: 2024-06-24
Payer: COMMERCIAL

## 2024-06-24 PROCEDURE — G0156 HHCP-SVS OF AIDE,EA 15 MIN: HCPCS

## 2024-06-26 ENCOUNTER — HOME CARE VISIT (OUTPATIENT)
Dept: SCHEDULING | Facility: HOME HEALTH | Age: 89
End: 2024-06-26
Payer: COMMERCIAL

## 2024-06-27 ENCOUNTER — HOME CARE VISIT (OUTPATIENT)
Dept: SCHEDULING | Facility: HOME HEALTH | Age: 89
End: 2024-06-27
Payer: COMMERCIAL

## 2024-06-27 PROCEDURE — G0156 HHCP-SVS OF AIDE,EA 15 MIN: HCPCS

## 2024-07-01 NOTE — HOSPICE
Routine home visit conducted today. Present for visit were pt, pt's caregiver, Ashutosh, and RN. RN greeted at door by Ashutosh. Pt asleep in hospital bed upon arrival. Ashutosh reports pt has been sleeping about 22 hours each day. Difficult to awaken. Pt did not make much sense when he did awaken. Unable to follow simple commands. Ashutosh reports that pt has moments of lucidity but that he is mostly confused and/or asleep. No nonverbal s/s of pain at rest. Pt did moan and grimace when RN lifted legs to assess heals. Ashutosh reports administering pain medication as directed. Ashutosh stated he changed pt's DSGs this morning and asked RN to not change them again because it causes pt discomfort. RN agreed and asked Ashutosh to please wait to change DSGs until RN arrives for next visit so that measurements can be obatained. Ashutosh reports that pt has no eaten in 4 days and as of today is having trouble drinking from a straw. Pt appears to be imminent. PPS 10%. Team notified and SNVs increased to daily.   Full assessment performed, see ROS.  Education provided, see POC.  No DME/supply/medication needs expressed.   Ashutosh reminded of 24/7 RN availability and encouraged to call [facility] at any time with questions or concerns.

## 2024-07-01 NOTE — HOSPICE
Pt sleeping throughout visit and does not respond to touch or name being called. Granddaughter Cole and her  Ashutosh are both at bedside and report pt has definitely made a decline and is sleeping most of the time. Pt is not eating. Cole and Ashutosh have questions about making  plans and what to do when pt passes. SW provides education and Cole and Ashutosh confirm understanding. Pt will use [facility], but plans have not yet been made. No further SW needs at this time.

## 2024-07-02 NOTE — HOSPICE
Daily visit conducted for imminent pt. Present were pt, nephew and niece/PCGs, and RN. RN greeted at door by Arjun cobianin. Pt asleep in hospital bed upon arrival, Difficult to arouse. Responds to painful stim, Unable to follow comands. Ashutosh reported pt had an episode this morning where he was converse and lucid but then went back to sleep. During that time pt drank about 8 oz of water. Pt has not consumed anything else in the last 24 hours. No nonverbal s/s of pain observed. Ashutosh reports administering morphine tid. Ashutosh educated on EOL care and symptom management. Ashutosh stated he was a CNA and feels very comfortable caring for pt during ths stage.   Roxanol refill requested via [employer].   No DME/supply needs expressed.   Family reminded of 24/7 RN availability and encouraged to call [facility] at any time with questions or concerns as well as at TOD.

## 2024-07-04 NOTE — HOSPICE
Daily visit conducted for imminent pt. Present were pt, nephew and niece/PCGs, and RN. RN greeted at door by Ashutosh cobian. Pt asleep in hospital bed upon arrival, Difficult to arouse. Responds to painful stimulation. Unable to follow comands. Ashutosh reported pt has moments where he \"wakes up and talks making sense and then goes back to sleep\". Pt has not consumed any solid food in several days. Pt sipping on water only when awake. No nonverbal s/s of pain observed. Ashutosh reports administering morphine tid. Ashutosh and Cole Niharika educated on EOL care and symptom management.  No DME/supply/medication needs expressed.   Family reminded of 24/7 RN availability and encouraged to call [facility] at any time with questions or concerns as well as at TOD.

## 2024-07-06 NOTE — HOSPICE
Pt's family declined daily visit today, but are aware that they can contact [facility] at any time with concerns, questions or needs.

## 2024-07-08 NOTE — HOSPICE
Visit made to provide support to family as pt has been placed on daily visits list. Pt is unresposnive and does not indicate pain or dicomfort at this time. CG Ashutosh is at bedside and reports pt has been calling out a few times a day, but otherwise has been comfortably sleeping. Ashutosh expresses some frustration that he and Cole are not able to understand what pt needs when he is calling out. SW provided active listening and emotional support. No other needs voiced at this time. SW updated RN on pt's condition.

## 2024-10-15 NOTE — PROGRESS NOTES
Pharmacokinetic Consult to Pharmacist    Miguel Barton is a 80 y.o. male being treated for sepsis with vanc. Height: 6' 1\" (185.4 cm)  Weight: 102.1 kg (225 lb)  Lab Results   Component Value Date/Time    BUN 35 (H) 07/15/2020 10:49 PM    Creatinine 1.33 07/15/2020 10:49 PM    WBC 16.7 (H) 07/15/2020 10:49 PM    Lactic acid 2.4 (HH) 07/15/2020 10:49 PM      Estimated Creatinine Clearance: 51 mL/min (based on SCr of 1.33 mg/dL). CULTURES:  BC x 2 pending, UA cx pending  Day 1 of vancomycin. Goal trough is 15-20mcg/ml. Vancomycin dose initiated at 2.5gm load follow by 1.5gm q18hr. Pharmacy to monitor and adjust vanc per protocol. Will continue to follow patient and order levels when clinically indicated.     Thank you for this consult,  Alexander Church, RadhaD No

## (undated) DEVICE — SOLUTION IRRIG 3000ML H2O STRL BAG

## (undated) DEVICE — NEEDLE HYPO 21GA L1.5IN INTRAMUSCULAR S STL LATCH BVL UP

## (undated) DEVICE — Z DISCONTINUED USE 2423037 APPLICATOR MEDICATED 3 CC CHLORHEXIDINE GLUC 2% CHLORAPREP

## (undated) DEVICE — TOTAL KNEE DR WATSON: Brand: MEDLINE INDUSTRIES, INC.

## (undated) DEVICE — 3M™ STERI-DRAPE™ INSTRUMENT POUCH 1018: Brand: STERI-DRAPE™

## (undated) DEVICE — SYR 10ML LUER LOK 1/5ML GRAD --

## (undated) DEVICE — CONTAINER,SPECIMEN,O.R.STRL,4.5OZ: Brand: MEDLINE

## (undated) DEVICE — GDWIRE 3CM FLX-TIP 0.038X150CM -- BX/5 SENSOR

## (undated) DEVICE — Z DISCONTINUED USE 2744636  DRESSING AQUACEL 14 IN ALG W3.5XL14IN POLYUR FLM CVR W/ HYDRCOLL

## (undated) DEVICE — REM POLYHESIVE ADULT PATIENT RETURN ELECTRODE: Brand: VALLEYLAB

## (undated) DEVICE — SUTURE STRATAFIX SPRL SZ 1 L5IN ABSRB VLT CT-1 L36MM 1/2 SXPD2B401

## (undated) DEVICE — STERILE PRESSURE PROTECTOR PAD® FOR DE MAYO UNIVERSAL DISTRACTOR® (10/CASE): Brand: DE MAYO UNIVERSAL DISTRACTOR®

## (undated) DEVICE — SUTURE FIBERWIRE SZ 2 W/ TAPERED NEEDLE BLUE L38IN NONABSORB BLU L26.5MM 1/2 CIRCLE AR7200

## (undated) DEVICE — GOWN,REINFORCED,POLY,AURORA,XXLARGE,STR: Brand: MEDLINE

## (undated) DEVICE — T4 HOOD

## (undated) DEVICE — INTENDED FOR TISSUE SEPARATION, AND OTHER PROCEDURES THAT REQUIRE A SHARP SURGICAL BLADE TO PUNCTURE OR CUT.: Brand: BARD-PARKER SAFETY BLADES SIZE 15, STERILE

## (undated) DEVICE — SYR LR LCK 1ML GRAD NSAF 30ML --

## (undated) DEVICE — DRAPE TWL SURG 16X26IN BLU ORB04] ALLCARE INC]

## (undated) DEVICE — DEVICE SECUREMENT AD W/ TRICOT ANCHR PD FOR F LTX SIL CATH

## (undated) DEVICE — SOLUTION IV 250ML 0.9% SOD CHL CLR INJ FLX BG CONT PRT CLSR

## (undated) DEVICE — KIT PROC KNE TRACKING PK/1 -- VIZADISC MAKO

## (undated) DEVICE — 3M™ TEGADERM™ TRANSPARENT FILM DRESSING FRAME STYLE, 1626W, 4 IN X 4-3/4 IN (10 CM X 12 CM), 50/CT 4CT/CASE: Brand: 3M™ TEGADERM™

## (undated) DEVICE — NEEDLE HYPO 25GA L1.5IN BLU POLYPR HUB S STL REG BVL STR

## (undated) DEVICE — DRAPE,TOP,102X53,STERILE: Brand: MEDLINE

## (undated) DEVICE — (D)PREP SKN CHLRAPRP APPL 26ML -- CONVERT TO ITEM 371833

## (undated) DEVICE — BIPOLAR SEALER 23-313-1 AQM 9.5XL: Brand: AQUAMANTYS ®

## (undated) DEVICE — BUTTON SWITCH PENCIL BLADE ELECTRODE, HOLSTER: Brand: EDGE

## (undated) DEVICE — DRAPE SHT 3 QTR PROXIMA 53X77 --

## (undated) DEVICE — CATHETER URETH 16FR BLLN 5CC STD LTX 2 W F TWO OPP DRNGE

## (undated) DEVICE — DUAL CUT SAGITTAL BLADE

## (undated) DEVICE — HANDPIECE SET WITH COAXIAL HIGH FLOW TIP AND SUCTION TUBE: Brand: INTERPULSE

## (undated) DEVICE — TRAY PREP DRY W/ PREM GLV 2 APPL 6 SPNG 2 UNDPD 1 OVERWRAP

## (undated) DEVICE — PIN BNE 4X110MM STRL -- 2/PK MAKO

## (undated) DEVICE — AMD ANTIMICROBIAL GAUZE SPONGES,12 PLY USP TYPE VII, 0.2% POLYHEXAMETHYLENE BIGUANIDE HCI (PHMB): Brand: CURITY

## (undated) DEVICE — BLADE SURG SAW STD S STL OSC W/ SERR EDGE DISP

## (undated) DEVICE — BANDAGE COBAN 4 IN COMPR W4INXL5YD FOAM COHESIVE QUIK STK SELF ADH SFT

## (undated) DEVICE — SUTURE PERMAHAND SZ 2-0 L18IN NONABSORBABLE BLK L26MM PS 1588H

## (undated) DEVICE — BAG,DRAINAGE,4L,A/R TOWER,LL,SLIDE TAP: Brand: MEDLINE

## (undated) DEVICE — PIN BNE FIX 4X140MM STRL -- 1EA=2PK MAKO

## (undated) DEVICE — KIT INT FIX FEM TIB CKPT MAKOPLASTY

## (undated) DEVICE — SOLUTION IRRIG 3000ML 0.9% SOD CHL FLX CONT 0797208] ICU MEDICAL INC]

## (undated) DEVICE — ENDOSCOPIC VALVE WITH ADAPTER.: Brand: SURSEAL® II

## (undated) DEVICE — COOK SP TUBE INTRODUCER SET: Brand: COOK

## (undated) DEVICE — SOLUTION IV 1000ML 0.9% SOD CHL

## (undated) DEVICE — TANK YANK SHORT STRAIGHT

## (undated) DEVICE — SUTURE MCRYL SZ 2-0 L27IN ABSRB UD CP-1 1 L36MM 1/2 CIR REV Y266H

## (undated) DEVICE — PACK SURGICAL PROCEDURE KIT CYSTOSCOPY TOTE